# Patient Record
Sex: FEMALE | Race: WHITE
[De-identification: names, ages, dates, MRNs, and addresses within clinical notes are randomized per-mention and may not be internally consistent; named-entity substitution may affect disease eponyms.]

---

## 2018-01-26 ENCOUNTER — HOSPITAL ENCOUNTER (OUTPATIENT)
Dept: HOSPITAL 8 - CFH | Age: 83
Discharge: HOME | End: 2018-01-26
Attending: LICENSED PRACTICAL NURSE
Payer: MEDICARE

## 2018-01-26 DIAGNOSIS — Z13.820: Primary | ICD-10-CM

## 2018-01-26 DIAGNOSIS — M85.88: ICD-10-CM

## 2018-01-26 PROCEDURE — 77080 DXA BONE DENSITY AXIAL: CPT

## 2018-04-27 ENCOUNTER — HOSPITAL ENCOUNTER (OUTPATIENT)
Dept: HOSPITAL 8 - RAD | Age: 83
Discharge: HOME | End: 2018-04-27
Attending: NURSE PRACTITIONER
Payer: MEDICARE

## 2018-04-27 DIAGNOSIS — K22.8: ICD-10-CM

## 2018-04-27 DIAGNOSIS — K21.9: Primary | ICD-10-CM

## 2018-04-27 PROCEDURE — 74241: CPT

## 2018-12-22 ENCOUNTER — OFFICE VISIT (OUTPATIENT)
Dept: URGENT CARE | Facility: MEDICAL CENTER | Age: 83
End: 2018-12-22
Payer: MEDICARE

## 2018-12-22 VITALS
TEMPERATURE: 97.4 F | BODY MASS INDEX: 24.28 KG/M2 | DIASTOLIC BLOOD PRESSURE: 100 MMHG | OXYGEN SATURATION: 94 % | WEIGHT: 160.2 LBS | HEART RATE: 76 BPM | SYSTOLIC BLOOD PRESSURE: 138 MMHG | HEIGHT: 68 IN

## 2018-12-22 DIAGNOSIS — J02.9 VIRAL PHARYNGITIS: ICD-10-CM

## 2018-12-22 LAB
INT CON NEG: NORMAL
INT CON POS: NORMAL
S PYO AG THROAT QL: NEGATIVE

## 2018-12-22 PROCEDURE — 99203 OFFICE O/P NEW LOW 30 MIN: CPT | Performed by: PHYSICIAN ASSISTANT

## 2018-12-22 PROCEDURE — 87880 STREP A ASSAY W/OPTIC: CPT | Performed by: PHYSICIAN ASSISTANT

## 2018-12-22 RX ORDER — HYDROCHLOROTHIAZIDE 25 MG/1
TABLET ORAL
COMMUNITY
Start: 2018-12-06 | End: 2019-01-21

## 2018-12-22 RX ORDER — AMITRIPTYLINE HYDROCHLORIDE 10 MG/1
TABLET, FILM COATED ORAL EVERY EVENING
Status: ON HOLD | COMMUNITY
Start: 2018-12-07 | End: 2019-02-11

## 2018-12-22 RX ORDER — DIPHENHYDRAMINE HYDROCHLORIDE AND LIDOCAINE HYDROCHLORIDE AND ALUMINUM HYDROXIDE AND MAGNESIUM HYDRO
5 KIT EVERY 6 HOURS PRN
Qty: 100 ML | Refills: 0 | Status: SHIPPED | OUTPATIENT
Start: 2018-12-22 | End: 2019-01-21

## 2018-12-22 ASSESSMENT — ENCOUNTER SYMPTOMS
VOMITING: 0
COUGH: 0
SHORTNESS OF BREATH: 0
MUSCULOSKELETAL NEGATIVE: 1
FEVER: 0
DIARRHEA: 0
NECK PAIN: 0
NAUSEA: 0
CHILLS: 0
SPUTUM PRODUCTION: 0
TROUBLE SWALLOWING: 1
ABDOMINAL PAIN: 0
SORE THROAT: 1
DIZZINESS: 0
SWOLLEN GLANDS: 0

## 2018-12-22 NOTE — PROGRESS NOTES
"Subjective:      Ina Small is a 85 y.o. female who presents with Pharyngitis (hard to swallow X1 day)            Pharyngitis    This is a new problem. The current episode started yesterday. The problem has been unchanged. Neither side of throat is experiencing more pain than the other. There has been no fever. The pain is at a severity of 4/10. The pain is moderate. Associated symptoms include trouble swallowing. Pertinent negatives include no abdominal pain, congestion, coughing, diarrhea, ear pain, plugged ear sensation, neck pain, shortness of breath, swollen glands or vomiting. She has had no exposure to strep. She has tried nothing for the symptoms.       Review of Systems   Constitutional: Negative for chills and fever.   HENT: Positive for sore throat and trouble swallowing. Negative for congestion and ear pain.    Respiratory: Negative for cough, sputum production and shortness of breath.    Cardiovascular: Negative for chest pain.   Gastrointestinal: Negative for abdominal pain, diarrhea, nausea and vomiting.   Genitourinary: Negative.    Musculoskeletal: Negative.  Negative for neck pain.   Skin: Negative for rash.   Neurological: Negative for dizziness.          Objective:     /100   Pulse 76   Temp 36.3 °C (97.4 °F) (Temporal)   Ht 1.715 m (5' 7.5\")   Wt 72.7 kg (160 lb 3.2 oz)   SpO2 94%   BMI 24.72 kg/m²      Physical Exam   Constitutional: She is oriented to person, place, and time. She appears well-developed and well-nourished. No distress.   HENT:   Head: Normocephalic and atraumatic.   Right Ear: Hearing, tympanic membrane, external ear and ear canal normal.   Left Ear: Hearing, tympanic membrane, external ear and ear canal normal.   Mouth/Throat: Posterior oropharyngeal erythema present. No oropharyngeal exudate or posterior oropharyngeal edema. No tonsillar exudate.   Mild posterior oropharyngeal erythema without enlarged tonsils or exudates noted.    Eyes: Pupils are equal, " round, and reactive to light. Conjunctivae are normal. Right eye exhibits no discharge. Left eye exhibits no discharge.   Neck: Normal range of motion.   Cardiovascular: Normal rate, regular rhythm and normal heart sounds.    No murmur heard.  Pulmonary/Chest: Effort normal and breath sounds normal. No respiratory distress. She has no wheezes.   Musculoskeletal: Normal range of motion.   Lymphadenopathy:     She has no cervical adenopathy.   Neurological: She is alert and oriented to person, place, and time.   Skin: Skin is warm and dry. She is not diaphoretic.   Psychiatric: She has a normal mood and affect. Her behavior is normal.   Nursing note and vitals reviewed.         PMH:  has a past medical history of Arthritis; Hypertension; and Other specified disorder of intestines.  MEDS:   Current Outpatient Prescriptions:   •  amitriptyline (ELAVIL) 10 MG Tab, , Disp: , Rfl:   •  DPH-Lido-AlHydr-MgHydr-Simeth (MAGIC MOUTHWASH BLM) Suspension, Take 5 mL by mouth every 6 hours as needed., Disp: 100 mL, Rfl: 0  •  Estrogens, Conjugated (PREMARIN VA), Insert  in vagina., Disp: , Rfl:   •  hydrochlorothiazide (HYDRODIURIL) 12.5 MG tablet, Take 12.5 mg by mouth every day., Disp: , Rfl:   •  gabapentin (NEURONTIN) 300 MG CAPS, Take 300 mg by mouth 2 times a day., Disp: , Rfl:   •  tramadol (ULTRAM) 50 MG TABS, Take  mg by mouth 2 Times a Day., Disp: , Rfl:   •  Multiple Vitamin (MULTI-VITAMIN PO), Take  by mouth every day., Disp: , Rfl:   •  VITAMIN D PO, Take  by mouth every day., Disp: , Rfl:   •  hydroCHLOROthiazide (HYDRODIURIL) 25 MG Tab, , Disp: , Rfl:   •  ondansetron (ZOFRAN ODT) 8 MG TBDP, Take 1 Tab by mouth every 8 hours as needed for Nausea/Vomiting., Disp: 15 Tab, Rfl: 0  •  levothyroxine (SYNTHROID) 50 MCG TABS, Take 50 mcg by mouth every day., Disp: , Rfl:   •  meclizine (ANTIVERT) 25 MG TABS, Take 1 Tab by mouth 3 times a day as needed for Dizziness, Nausea/Vomiting or Vertigo., Disp: 12 Tab, Rfl: 0  •   aspirin (ASA) 81 MG CHEW, Take 81 mg by mouth every day., Disp: , Rfl:   •  Omega-3 Fatty Acids (FISH OIL PO), Take 4,000 mg by mouth 2 times a day., Disp: , Rfl:   ALLERGIES: No Known Allergies  SURGHX:   Past Surgical History:   Procedure Laterality Date   • COLON OVERGROWTH  1/8/2010    Performed by TRACI GARCIA at ENDOSCOPY [facility]   • GYN SURGERY      tubal ligation 1965,d&c 1974,1976,1983   • OTHER ABDOMINAL SURGERY      appey 1956   • OTHER ORTHOPEDIC SURGERY      total knee right 2007     SOCHX:  reports that she has quit smoking. She has never used smokeless tobacco. She reports that she drinks alcohol. She reports that she does not use drugs.  FH: family history is not on file.       Assessment/Plan:     1. Viral pharyngitis  - POCT Rapid Strep A: NEGATIVE  - DPH-Lido-AlHydr-MgHydr-Simeth (MAGIC MOUTHWASH BLM) Suspension; Take 5 mL by mouth every 6 hours as needed.  Dispense: 100 mL; Refill: 0    Advised patient symptoms are most likely viral in etiology, recommend supportive care. Increased fluids and rest. Warm salt water gargles and magic mouthwash as needed for symptomatic relief. Call or return to office if symptoms persist or worsen. The patient demonstrated a good understanding and agreed with the treatment plan.

## 2019-01-10 ENCOUNTER — APPOINTMENT (OUTPATIENT)
Dept: OTHER | Facility: IMAGING CENTER | Age: 84
End: 2019-01-10

## 2019-01-21 DIAGNOSIS — Z01.812 PRE-OPERATIVE LABORATORY EXAMINATION: ICD-10-CM

## 2019-01-21 DIAGNOSIS — Z01.810 PRE-OPERATIVE CARDIOVASCULAR EXAMINATION: ICD-10-CM

## 2019-01-21 LAB
ANION GAP SERPL CALC-SCNC: 6 MMOL/L (ref 0–11.9)
APPEARANCE UR: CLEAR
BILIRUB UR QL STRIP.AUTO: NEGATIVE
BUN SERPL-MCNC: 18 MG/DL (ref 8–22)
CALCIUM SERPL-MCNC: 10.3 MG/DL (ref 8.5–10.5)
CHLORIDE SERPL-SCNC: 101 MMOL/L (ref 96–112)
CO2 SERPL-SCNC: 31 MMOL/L (ref 20–33)
COLOR UR: YELLOW
CREAT SERPL-MCNC: 1.04 MG/DL (ref 0.5–1.4)
EKG IMPRESSION: NORMAL
ERYTHROCYTE [DISTWIDTH] IN BLOOD BY AUTOMATED COUNT: 44.1 FL (ref 35.9–50)
GLUCOSE SERPL-MCNC: 89 MG/DL (ref 65–99)
GLUCOSE UR STRIP.AUTO-MCNC: NEGATIVE MG/DL
HCT VFR BLD AUTO: 45.8 % (ref 37–47)
HGB BLD-MCNC: 14.7 G/DL (ref 12–16)
KETONES UR STRIP.AUTO-MCNC: NEGATIVE MG/DL
LEUKOCYTE ESTERASE UR QL STRIP.AUTO: NEGATIVE
MCH RBC QN AUTO: 29.1 PG (ref 27–33)
MCHC RBC AUTO-ENTMCNC: 32.1 G/DL (ref 33.6–35)
MCV RBC AUTO: 90.5 FL (ref 81.4–97.8)
MICRO URNS: NORMAL
NITRITE UR QL STRIP.AUTO: NEGATIVE
PH UR STRIP.AUTO: 7.5 [PH]
PLATELET # BLD AUTO: 228 K/UL (ref 164–446)
PMV BLD AUTO: 11.1 FL (ref 9–12.9)
POTASSIUM SERPL-SCNC: 3.7 MMOL/L (ref 3.6–5.5)
PROT UR QL STRIP: NEGATIVE MG/DL
RBC # BLD AUTO: 5.06 M/UL (ref 4.2–5.4)
RBC UR QL AUTO: NEGATIVE
SCCMEC + MECA PNL NOSE NAA+PROBE: NEGATIVE
SCCMEC + MECA PNL NOSE NAA+PROBE: NEGATIVE
SODIUM SERPL-SCNC: 138 MMOL/L (ref 135–145)
SP GR UR STRIP.AUTO: 1.01
UROBILINOGEN UR STRIP.AUTO-MCNC: 1 MG/DL
WBC # BLD AUTO: 7.7 K/UL (ref 4.8–10.8)

## 2019-01-21 PROCEDURE — 93010 ELECTROCARDIOGRAM REPORT: CPT | Performed by: INTERNAL MEDICINE

## 2019-01-21 PROCEDURE — 85027 COMPLETE CBC AUTOMATED: CPT

## 2019-01-21 PROCEDURE — 81003 URINALYSIS AUTO W/O SCOPE: CPT

## 2019-01-21 PROCEDURE — 36415 COLL VENOUS BLD VENIPUNCTURE: CPT

## 2019-01-21 PROCEDURE — 93005 ELECTROCARDIOGRAM TRACING: CPT

## 2019-01-21 PROCEDURE — 87640 STAPH A DNA AMP PROBE: CPT

## 2019-01-21 PROCEDURE — 80048 BASIC METABOLIC PNL TOTAL CA: CPT

## 2019-01-21 PROCEDURE — 87086 URINE CULTURE/COLONY COUNT: CPT

## 2019-01-21 PROCEDURE — 87641 MR-STAPH DNA AMP PROBE: CPT

## 2019-01-21 RX ORDER — HYDROCHLOROTHIAZIDE 25 MG/1
25 TABLET ORAL EVERY MORNING
COMMUNITY

## 2019-01-21 RX ORDER — POLYETHYLENE GLYCOL 3350 17 G/17G
17 POWDER, FOR SOLUTION ORAL DAILY
Status: ON HOLD | COMMUNITY
End: 2019-02-11

## 2019-01-21 RX ORDER — FEXOFENADINE HCL 180 MG/1
180 TABLET ORAL EVERY MORNING
COMMUNITY

## 2019-01-21 RX ORDER — IBUPROFEN 200 MG
200 TABLET ORAL EVERY 6 HOURS PRN
Status: ON HOLD | COMMUNITY
End: 2019-02-11

## 2019-01-21 NOTE — OR NURSING
"Preadmit appointment: \" Preparing for your Procedure information\" sheet given to patient with verbal and written instructions. Patient instructed to continue prescribed medications through the day before surgery, instructed to take the following medications the day of surgery per anesthesia protocol: gabapentin, tramadol.  "

## 2019-01-21 NOTE — DISCHARGE PLANNING
DISCHARGE PLANNING NOTE - TOTAL JOINT     Procedure: Procedure(s):  KNEE ARTHROPLASTY TOTAL  Procedure Date: 2/11/2019  Insurance:  Payor: MEDICARE / Plan: MEDICARE PART A & B / Product Type: *No Product type* /   Equipment currently available at home? bedside commode, front-wheel walker, raised toilet seat, shower chair and reachers  Steps into the home? 3  Steps within the home? none  Toilet height? Standard with riser  Type of shower? walk-in shower with bench and hose  Who will be with you during your recovery? DaughterRegino  Is Outpatient Physical Therapy set up after surgery? No but planned  Did you take the Total Joint Class and where? Yes, at [facility]     Plan: Met with patient during preadmission appointment.  Reviewed Equipment Resource list and provided a copy to the patient with Care Chest recommendation.  Provided and reviewed Home Safety Checklist.  Quiet Hours information given and reviewed.  There are no identified discharge needs at this time.  Anticipate discharge home with family.  No Dme ordered/needed at this time.

## 2019-01-24 LAB
BACTERIA UR CULT: NORMAL
SIGNIFICANT IND 70042: NORMAL
SITE SITE: NORMAL
SOURCE SOURCE: NORMAL

## 2019-02-11 ENCOUNTER — APPOINTMENT (OUTPATIENT)
Dept: RADIOLOGY | Facility: MEDICAL CENTER | Age: 84
End: 2019-02-11
Attending: ORTHOPAEDIC SURGERY
Payer: MEDICARE

## 2019-02-11 ENCOUNTER — HOSPITAL ENCOUNTER (OUTPATIENT)
Facility: MEDICAL CENTER | Age: 84
End: 2019-02-12
Attending: ORTHOPAEDIC SURGERY | Admitting: ORTHOPAEDIC SURGERY
Payer: MEDICARE

## 2019-02-11 PROCEDURE — 160022 HCHG BLOCK: Performed by: ORTHOPAEDIC SURGERY

## 2019-02-11 PROCEDURE — 73560 X-RAY EXAM OF KNEE 1 OR 2: CPT | Mod: LT

## 2019-02-11 PROCEDURE — 700102 HCHG RX REV CODE 250 W/ 637 OVERRIDE(OP): Performed by: STUDENT IN AN ORGANIZED HEALTH CARE EDUCATION/TRAINING PROGRAM

## 2019-02-11 PROCEDURE — A9270 NON-COVERED ITEM OR SERVICE: HCPCS | Performed by: ORTHOPAEDIC SURGERY

## 2019-02-11 PROCEDURE — L8699 PROSTHETIC IMPLANT NOS: HCPCS | Performed by: ORTHOPAEDIC SURGERY

## 2019-02-11 PROCEDURE — 700111 HCHG RX REV CODE 636 W/ 250 OVERRIDE (IP): Performed by: STUDENT IN AN ORGANIZED HEALTH CARE EDUCATION/TRAINING PROGRAM

## 2019-02-11 PROCEDURE — 160002 HCHG RECOVERY MINUTES (STAT): Performed by: ORTHOPAEDIC SURGERY

## 2019-02-11 PROCEDURE — 700111 HCHG RX REV CODE 636 W/ 250 OVERRIDE (IP)

## 2019-02-11 PROCEDURE — 160009 HCHG ANES TIME/MIN: Performed by: ORTHOPAEDIC SURGERY

## 2019-02-11 PROCEDURE — 700102 HCHG RX REV CODE 250 W/ 637 OVERRIDE(OP)

## 2019-02-11 PROCEDURE — 700112 HCHG RX REV CODE 229: Performed by: ORTHOPAEDIC SURGERY

## 2019-02-11 PROCEDURE — 160035 HCHG PACU - 1ST 60 MINS PHASE I: Performed by: ORTHOPAEDIC SURGERY

## 2019-02-11 PROCEDURE — 96365 THER/PROPH/DIAG IV INF INIT: CPT

## 2019-02-11 PROCEDURE — 700101 HCHG RX REV CODE 250: Performed by: ORTHOPAEDIC SURGERY

## 2019-02-11 PROCEDURE — A9270 NON-COVERED ITEM OR SERVICE: HCPCS | Performed by: STUDENT IN AN ORGANIZED HEALTH CARE EDUCATION/TRAINING PROGRAM

## 2019-02-11 PROCEDURE — 501838 HCHG SUTURE GENERAL: Performed by: ORTHOPAEDIC SURGERY

## 2019-02-11 PROCEDURE — 700111 HCHG RX REV CODE 636 W/ 250 OVERRIDE (IP): Performed by: ORTHOPAEDIC SURGERY

## 2019-02-11 PROCEDURE — G0378 HOSPITAL OBSERVATION PER HR: HCPCS

## 2019-02-11 PROCEDURE — 700102 HCHG RX REV CODE 250 W/ 637 OVERRIDE(OP): Performed by: ORTHOPAEDIC SURGERY

## 2019-02-11 PROCEDURE — A9270 NON-COVERED ITEM OR SERVICE: HCPCS

## 2019-02-11 PROCEDURE — 160041 HCHG SURGERY MINUTES - EA ADDL 1 MIN LEVEL 4: Performed by: ORTHOPAEDIC SURGERY

## 2019-02-11 PROCEDURE — 97161 PT EVAL LOW COMPLEX 20 MIN: CPT

## 2019-02-11 PROCEDURE — 700105 HCHG RX REV CODE 258: Performed by: ORTHOPAEDIC SURGERY

## 2019-02-11 PROCEDURE — 160029 HCHG SURGERY MINUTES - 1ST 30 MINS LEVEL 4: Performed by: ORTHOPAEDIC SURGERY

## 2019-02-11 PROCEDURE — 700101 HCHG RX REV CODE 250

## 2019-02-11 PROCEDURE — 160048 HCHG OR STATISTICAL LEVEL 1-5: Performed by: ORTHOPAEDIC SURGERY

## 2019-02-11 PROCEDURE — 502000 HCHG MISC OR IMPLANTS RC 0278: Performed by: ORTHOPAEDIC SURGERY

## 2019-02-11 PROCEDURE — 160036 HCHG PACU - EA ADDL 30 MINS PHASE I: Performed by: ORTHOPAEDIC SURGERY

## 2019-02-11 DEVICE — IMPLANT GII OVAL RESURFACING PAT 32MM (1EA): Type: IMPLANTABLE DEVICE | Site: KNEE | Status: FUNCTIONAL

## 2019-02-11 DEVICE — IMPLANT GNS II C/R FEM SIZE 6 LEFT: Type: IMPLANTABLE DEVICE | Site: KNEE | Status: FUNCTIONAL

## 2019-02-11 DEVICE — IMPLANTABLE DEVICE: Type: IMPLANTABLE DEVICE | Site: KNEE | Status: FUNCTIONAL

## 2019-02-11 DEVICE — CEMENT ORTHOPEDIC HV US  (10/PK): Type: IMPLANTABLE DEVICE | Site: KNEE | Status: FUNCTIONAL

## 2019-02-11 DEVICE — IMPLANT GII C/R ART INS SZ 3-4 11MM: Type: IMPLANTABLE DEVICE | Site: KNEE | Status: FUNCTIONAL

## 2019-02-11 DEVICE — IMPLANT GNS II CMT TIB SIZE 4 LEFT (1EA): Type: IMPLANTABLE DEVICE | Site: KNEE | Status: FUNCTIONAL

## 2019-02-11 RX ORDER — HYDROMORPHONE HYDROCHLORIDE 1 MG/ML
0.1 INJECTION, SOLUTION INTRAMUSCULAR; INTRAVENOUS; SUBCUTANEOUS
Status: DISCONTINUED | OUTPATIENT
Start: 2019-02-11 | End: 2019-02-11 | Stop reason: HOSPADM

## 2019-02-11 RX ORDER — DOCUSATE SODIUM 100 MG/1
100 CAPSULE, LIQUID FILLED ORAL 2 TIMES DAILY
Status: DISCONTINUED | OUTPATIENT
Start: 2019-02-11 | End: 2019-02-12 | Stop reason: HOSPADM

## 2019-02-11 RX ORDER — ACETAMINOPHEN 500 MG
1000 TABLET ORAL ONCE
Status: COMPLETED | OUTPATIENT
Start: 2019-02-11 | End: 2019-02-11

## 2019-02-11 RX ORDER — OXYCODONE HYDROCHLORIDE 10 MG/1
10 TABLET ORAL
Status: DISCONTINUED | OUTPATIENT
Start: 2019-02-11 | End: 2019-02-12 | Stop reason: HOSPADM

## 2019-02-11 RX ORDER — AMOXICILLIN 250 MG
1 CAPSULE ORAL
Status: DISCONTINUED | OUTPATIENT
Start: 2019-02-11 | End: 2019-02-12 | Stop reason: HOSPADM

## 2019-02-11 RX ORDER — OXYCODONE HCL 5 MG/5 ML
10 SOLUTION, ORAL ORAL
Status: COMPLETED | OUTPATIENT
Start: 2019-02-11 | End: 2019-02-11

## 2019-02-11 RX ORDER — GABAPENTIN 300 MG/1
300 CAPSULE ORAL 2 TIMES DAILY
Status: DISCONTINUED | OUTPATIENT
Start: 2019-02-11 | End: 2019-02-12 | Stop reason: HOSPADM

## 2019-02-11 RX ORDER — FEXOFENADINE HCL 60 MG/1
180 TABLET, FILM COATED ORAL EVERY MORNING
Status: DISCONTINUED | OUTPATIENT
Start: 2019-02-11 | End: 2019-02-12 | Stop reason: HOSPADM

## 2019-02-11 RX ORDER — HYDROMORPHONE HYDROCHLORIDE 1 MG/ML
0.4 INJECTION, SOLUTION INTRAMUSCULAR; INTRAVENOUS; SUBCUTANEOUS
Status: DISCONTINUED | OUTPATIENT
Start: 2019-02-11 | End: 2019-02-11 | Stop reason: HOSPADM

## 2019-02-11 RX ORDER — FLUTICASONE PROPIONATE 50 MCG
1-2 SPRAY, SUSPENSION (ML) NASAL
COMMUNITY

## 2019-02-11 RX ORDER — HALOPERIDOL 5 MG/ML
1 INJECTION INTRAMUSCULAR
Status: DISCONTINUED | OUTPATIENT
Start: 2019-02-11 | End: 2019-02-11 | Stop reason: HOSPADM

## 2019-02-11 RX ORDER — POLYETHYLENE GLYCOL 3350 17 G/17G
1 POWDER, FOR SOLUTION ORAL 2 TIMES DAILY PRN
Status: DISCONTINUED | OUTPATIENT
Start: 2019-02-11 | End: 2019-02-12 | Stop reason: HOSPADM

## 2019-02-11 RX ORDER — CELECOXIB 200 MG/1
CAPSULE ORAL
Status: COMPLETED
Start: 2019-02-11 | End: 2019-02-11

## 2019-02-11 RX ORDER — DEXTROSE, SODIUM CHLORIDE, SODIUM LACTATE, POTASSIUM CHLORIDE, AND CALCIUM CHLORIDE 5; .6; .31; .03; .02 G/100ML; G/100ML; G/100ML; G/100ML; G/100ML
INJECTION, SOLUTION INTRAVENOUS CONTINUOUS
Status: DISCONTINUED | OUTPATIENT
Start: 2019-02-11 | End: 2019-02-12 | Stop reason: HOSPADM

## 2019-02-11 RX ORDER — DIPHENHYDRAMINE HYDROCHLORIDE 50 MG/ML
25 INJECTION INTRAMUSCULAR; INTRAVENOUS EVERY 6 HOURS PRN
Status: DISCONTINUED | OUTPATIENT
Start: 2019-02-11 | End: 2019-02-12 | Stop reason: HOSPADM

## 2019-02-11 RX ORDER — ACETAMINOPHEN 650 MG/1
650 SUPPOSITORY RECTAL EVERY 6 HOURS
Status: DISCONTINUED | OUTPATIENT
Start: 2019-02-11 | End: 2019-02-11

## 2019-02-11 RX ORDER — AMITRIPTYLINE HYDROCHLORIDE 50 MG/1
100 TABLET, FILM COATED ORAL
Status: DISCONTINUED | OUTPATIENT
Start: 2019-02-11 | End: 2019-02-11

## 2019-02-11 RX ORDER — DEXAMETHASONE SODIUM PHOSPHATE 4 MG/ML
4 INJECTION, SOLUTION INTRA-ARTICULAR; INTRALESIONAL; INTRAMUSCULAR; INTRAVENOUS; SOFT TISSUE
Status: DISCONTINUED | OUTPATIENT
Start: 2019-02-11 | End: 2019-02-12 | Stop reason: HOSPADM

## 2019-02-11 RX ORDER — HYDROMORPHONE HYDROCHLORIDE 1 MG/ML
0.5 INJECTION, SOLUTION INTRAMUSCULAR; INTRAVENOUS; SUBCUTANEOUS
Status: DISCONTINUED | OUTPATIENT
Start: 2019-02-11 | End: 2019-02-12 | Stop reason: HOSPADM

## 2019-02-11 RX ORDER — AMITRIPTYLINE HYDROCHLORIDE 10 MG/1
10 TABLET, FILM COATED ORAL
COMMUNITY
End: 2021-07-01

## 2019-02-11 RX ORDER — DIPHENHYDRAMINE HCL 25 MG
25 TABLET ORAL EVERY 6 HOURS PRN
Status: DISCONTINUED | OUTPATIENT
Start: 2019-02-11 | End: 2019-02-12 | Stop reason: HOSPADM

## 2019-02-11 RX ORDER — ONDANSETRON 2 MG/ML
4 INJECTION INTRAMUSCULAR; INTRAVENOUS
Status: DISCONTINUED | OUTPATIENT
Start: 2019-02-11 | End: 2019-02-11 | Stop reason: HOSPADM

## 2019-02-11 RX ORDER — BISACODYL 10 MG
10 SUPPOSITORY, RECTAL RECTAL
Status: DISCONTINUED | OUTPATIENT
Start: 2019-02-11 | End: 2019-02-12 | Stop reason: HOSPADM

## 2019-02-11 RX ORDER — ROPIVACAINE HYDROCHLORIDE 5 MG/ML
INJECTION, SOLUTION EPIDURAL; INFILTRATION; PERINEURAL
Status: DISCONTINUED | OUTPATIENT
Start: 2019-02-11 | End: 2019-02-11 | Stop reason: HOSPADM

## 2019-02-11 RX ORDER — DIPHENHYDRAMINE HYDROCHLORIDE 50 MG/ML
12.5 INJECTION INTRAMUSCULAR; INTRAVENOUS
Status: DISCONTINUED | OUTPATIENT
Start: 2019-02-11 | End: 2019-02-11 | Stop reason: HOSPADM

## 2019-02-11 RX ORDER — AMITRIPTYLINE HYDROCHLORIDE 10 MG/1
10 TABLET, FILM COATED ORAL
Status: DISCONTINUED | OUTPATIENT
Start: 2019-02-11 | End: 2019-02-12 | Stop reason: HOSPADM

## 2019-02-11 RX ORDER — OXYCODONE HCL 5 MG/5 ML
5 SOLUTION, ORAL ORAL
Status: COMPLETED | OUTPATIENT
Start: 2019-02-11 | End: 2019-02-11

## 2019-02-11 RX ORDER — ACETAMINOPHEN 500 MG
TABLET ORAL
Status: COMPLETED
Start: 2019-02-11 | End: 2019-02-11

## 2019-02-11 RX ORDER — KETOROLAC TROMETHAMINE 30 MG/ML
INJECTION, SOLUTION INTRAMUSCULAR; INTRAVENOUS
Status: DISCONTINUED | OUTPATIENT
Start: 2019-02-11 | End: 2019-02-11 | Stop reason: HOSPADM

## 2019-02-11 RX ORDER — HALOPERIDOL 5 MG/ML
1 INJECTION INTRAMUSCULAR EVERY 6 HOURS PRN
Status: DISCONTINUED | OUTPATIENT
Start: 2019-02-11 | End: 2019-02-12 | Stop reason: HOSPADM

## 2019-02-11 RX ORDER — SODIUM CHLORIDE, SODIUM LACTATE, POTASSIUM CHLORIDE, CALCIUM CHLORIDE 600; 310; 30; 20 MG/100ML; MG/100ML; MG/100ML; MG/100ML
INJECTION, SOLUTION INTRAVENOUS CONTINUOUS
Status: DISCONTINUED | OUTPATIENT
Start: 2019-02-11 | End: 2019-02-11 | Stop reason: HOSPADM

## 2019-02-11 RX ORDER — EPINEPHRINE 1 MG/ML(1)
AMPUL (ML) INJECTION
Status: DISCONTINUED | OUTPATIENT
Start: 2019-02-11 | End: 2019-02-11 | Stop reason: HOSPADM

## 2019-02-11 RX ORDER — ONDANSETRON 2 MG/ML
4 INJECTION INTRAMUSCULAR; INTRAVENOUS EVERY 4 HOURS PRN
Status: DISCONTINUED | OUTPATIENT
Start: 2019-02-11 | End: 2019-02-12 | Stop reason: HOSPADM

## 2019-02-11 RX ORDER — OXYCODONE HYDROCHLORIDE 5 MG/1
5 TABLET ORAL
Status: DISCONTINUED | OUTPATIENT
Start: 2019-02-11 | End: 2019-02-12 | Stop reason: HOSPADM

## 2019-02-11 RX ORDER — CELECOXIB 200 MG/1
200 CAPSULE ORAL ONCE
Status: COMPLETED | OUTPATIENT
Start: 2019-02-11 | End: 2019-02-11

## 2019-02-11 RX ORDER — SODIUM CHLORIDE, SODIUM LACTATE, POTASSIUM CHLORIDE, CALCIUM CHLORIDE 600; 310; 30; 20 MG/100ML; MG/100ML; MG/100ML; MG/100ML
INJECTION, SOLUTION INTRAVENOUS
Status: DISCONTINUED | OUTPATIENT
Start: 2019-02-11 | End: 2019-02-12 | Stop reason: HOSPADM

## 2019-02-11 RX ORDER — SCOLOPAMINE TRANSDERMAL SYSTEM 1 MG/1
1 PATCH, EXTENDED RELEASE TRANSDERMAL
Status: DISCONTINUED | OUTPATIENT
Start: 2019-02-11 | End: 2019-02-12 | Stop reason: HOSPADM

## 2019-02-11 RX ORDER — ENEMA 19; 7 G/133ML; G/133ML
1 ENEMA RECTAL
Status: DISCONTINUED | OUTPATIENT
Start: 2019-02-11 | End: 2019-02-12 | Stop reason: HOSPADM

## 2019-02-11 RX ORDER — AMOXICILLIN 250 MG
1 CAPSULE ORAL NIGHTLY
Status: DISCONTINUED | OUTPATIENT
Start: 2019-02-11 | End: 2019-02-12 | Stop reason: HOSPADM

## 2019-02-11 RX ORDER — HYDROCHLOROTHIAZIDE 25 MG/1
25 TABLET ORAL EVERY MORNING
Status: DISCONTINUED | OUTPATIENT
Start: 2019-02-11 | End: 2019-02-12 | Stop reason: HOSPADM

## 2019-02-11 RX ORDER — HYDROMORPHONE HYDROCHLORIDE 1 MG/ML
0.2 INJECTION, SOLUTION INTRAMUSCULAR; INTRAVENOUS; SUBCUTANEOUS
Status: DISCONTINUED | OUTPATIENT
Start: 2019-02-11 | End: 2019-02-11 | Stop reason: HOSPADM

## 2019-02-11 RX ORDER — FLUTICASONE FUROATE 100 UG/1
1 POWDER RESPIRATORY (INHALATION)
COMMUNITY
Start: 2018-11-26 | End: 2021-07-01

## 2019-02-11 RX ORDER — ACETAMINOPHEN 325 MG/1
650 TABLET ORAL EVERY 6 HOURS
Status: DISCONTINUED | OUTPATIENT
Start: 2019-02-11 | End: 2019-02-12 | Stop reason: HOSPADM

## 2019-02-11 RX ADMIN — SODIUM CHLORIDE, POTASSIUM CHLORIDE, SODIUM LACTATE AND CALCIUM CHLORIDE 1000 ML: 600; 310; 30; 20 INJECTION, SOLUTION INTRAVENOUS at 08:25

## 2019-02-11 RX ADMIN — AMITRIPTYLINE HYDROCHLORIDE 10 MG: 10 TABLET, FILM COATED ORAL at 23:12

## 2019-02-11 RX ADMIN — OXYCODONE HYDROCHLORIDE 10 MG: 10 TABLET ORAL at 17:48

## 2019-02-11 RX ADMIN — SODIUM CHLORIDE, SODIUM LACTATE, POTASSIUM CHLORIDE, CALCIUM CHLORIDE: 600; 310; 30; 20 INJECTION, SOLUTION INTRAVENOUS at 11:16

## 2019-02-11 RX ADMIN — ACETAMINOPHEN 650 MG: 325 TABLET, FILM COATED ORAL at 17:49

## 2019-02-11 RX ADMIN — SODIUM CHLORIDE, SODIUM LACTATE, POTASSIUM CHLORIDE, CALCIUM CHLORIDE AND DEXTROSE MONOHYDRATE: 5; 600; 310; 30; 20 INJECTION, SOLUTION INTRAVENOUS at 13:00

## 2019-02-11 RX ADMIN — ACETAMINOPHEN 1000 MG: 500 TABLET, COATED ORAL at 08:06

## 2019-02-11 RX ADMIN — CELECOXIB 200 MG: 200 CAPSULE ORAL at 08:06

## 2019-02-11 RX ADMIN — DOCUSATE SODIUM 100 MG: 100 CAPSULE, LIQUID FILLED ORAL at 17:49

## 2019-02-11 RX ADMIN — GABAPENTIN 300 MG: 300 CAPSULE ORAL at 17:50

## 2019-02-11 RX ADMIN — Medication 1000 MG: at 08:06

## 2019-02-11 RX ADMIN — LIDOCAINE HYDROCHLORIDE 0.5 ML: 10 INJECTION, SOLUTION INFILTRATION; PERINEURAL at 08:25

## 2019-02-11 RX ADMIN — SODIUM CHLORIDE 2 G: 9 INJECTION, SOLUTION INTRAVENOUS at 17:50

## 2019-02-11 RX ADMIN — HYDROMORPHONE HYDROCHLORIDE 0.2 MG: 1 INJECTION, SOLUTION INTRAMUSCULAR; INTRAVENOUS; SUBCUTANEOUS at 12:07

## 2019-02-11 RX ADMIN — ACETAMINOPHEN 650 MG: 325 TABLET, FILM COATED ORAL at 12:59

## 2019-02-11 RX ADMIN — OXYCODONE HYDROCHLORIDE 10 MG: 10 TABLET ORAL at 12:59

## 2019-02-11 RX ADMIN — ACETAMINOPHEN 650 MG: 325 TABLET, FILM COATED ORAL at 23:12

## 2019-02-11 RX ADMIN — HYDROMORPHONE HYDROCHLORIDE 0.2 MG: 1 INJECTION, SOLUTION INTRAMUSCULAR; INTRAVENOUS; SUBCUTANEOUS at 12:02

## 2019-02-11 RX ADMIN — FENTANYL CITRATE 50 MCG: 50 INJECTION, SOLUTION INTRAMUSCULAR; INTRAVENOUS at 11:53

## 2019-02-11 RX ADMIN — OXYCODONE HYDROCHLORIDE 5 MG: 5 SOLUTION ORAL at 11:38

## 2019-02-11 RX ADMIN — OXYCODONE HYDROCHLORIDE 10 MG: 10 TABLET ORAL at 23:12

## 2019-02-11 ASSESSMENT — COGNITIVE AND FUNCTIONAL STATUS - GENERAL
MOVING TO AND FROM BED TO CHAIR: A LITTLE
TURNING FROM BACK TO SIDE WHILE IN FLAT BAD: A LITTLE
DRESSING REGULAR UPPER BODY CLOTHING: A LITTLE
DRESSING REGULAR LOWER BODY CLOTHING: A LITTLE
DAILY ACTIVITIY SCORE: 17
MOVING FROM LYING ON BACK TO SITTING ON SIDE OF FLAT BED: A LITTLE
SUGGESTED CMS G CODE MODIFIER MOBILITY: CK
MOBILITY SCORE: 17
CLIMB 3 TO 5 STEPS WITH RAILING: A LOT
SUGGESTED CMS G CODE MODIFIER DAILY ACTIVITY: CK
PERSONAL GROOMING: A LITTLE
EATING MEALS: A LOT
STANDING UP FROM CHAIR USING ARMS: A LITTLE
TOILETING: A LITTLE
WALKING IN HOSPITAL ROOM: A LITTLE
HELP NEEDED FOR BATHING: A LITTLE

## 2019-02-11 ASSESSMENT — LIFESTYLE VARIABLES
TOTAL SCORE: 0
EVER_SMOKED: NEVER
EVER HAD A DRINK FIRST THING IN THE MORNING TO STEADY YOUR NERVES TO GET RID OF A HANGOVER: NO
TOTAL SCORE: 0
HAVE YOU EVER FELT YOU SHOULD CUT DOWN ON YOUR DRINKING: NO
HOW MANY TIMES IN THE PAST YEAR HAVE YOU HAD 5 OR MORE DRINKS IN A DAY: 1
ON A TYPICAL DAY WHEN YOU DRINK ALCOHOL HOW MANY DRINKS DO YOU HAVE: 1
ALCOHOL_USE: YES
EVER FELT BAD OR GUILTY ABOUT YOUR DRINKING: NO
AVERAGE NUMBER OF DAYS PER WEEK YOU HAVE A DRINK CONTAINING ALCOHOL: 1
CONSUMPTION TOTAL: POSITIVE
EVER_SMOKED: NEVER
TOTAL SCORE: 0
HAVE PEOPLE ANNOYED YOU BY CRITICIZING YOUR DRINKING: NO

## 2019-02-11 ASSESSMENT — PATIENT HEALTH QUESTIONNAIRE - PHQ9
1. LITTLE INTEREST OR PLEASURE IN DOING THINGS: NOT AT ALL
2. FEELING DOWN, DEPRESSED, IRRITABLE, OR HOPELESS: NOT AT ALL
SUM OF ALL RESPONSES TO PHQ9 QUESTIONS 1 AND 2: 0

## 2019-02-11 ASSESSMENT — GAIT ASSESSMENTS: GAIT LEVEL OF ASSIST: REFUSED

## 2019-02-11 NOTE — PROGRESS NOTES
Received report from Pacu RN, assumed pt care. Pt transferred from PACU to GSU room 220. Pt A&Ox4, reports pain in knee, will medicate per MAR. Dressing to knee CDI, +CMS, cap refill less than 3 seconds, pt able to move leg w/o difficulty. Polar ice to knee. Pt d/t void post-op by 1830. Pt taught to inform nurse if experiencing pain above comfort goal, SOB, chest pain, ambulating out of bed, or for any further assistance. Fall precautions in place, call light within reach. Will continue with care.

## 2019-02-11 NOTE — THERAPY
"Physical Therapy Evaluation completed.   Bed Mobility:  Supine to Sit: Stand by Assist  Transfers: Sit to Stand: Contact Guard Assist  Gait: Level Of Assist: Refused (wants to ambulate later today, doesn't want to \"push it\") Plan of Care: Will benefit from Physical Therapy 7 times per week  Discharge Recommendations: Equipment: Will Continue to Assess for Equipment Needs. Post-acute therapy Discharge to home with outpt PT.    Pt is a 84 yo female s/p L TKA presenting with post-op pain and weakness, able to transfer safely to chair with FWW but did not feel like ambulating yet. Recommend continued acute PT to improve strength and mobility so can DC home safely with dtr assist.     See \"Rehab Therapy-Acute\" Patient Summary Report for complete documentation.     "

## 2019-02-11 NOTE — OP REPORT
DATE OF SERVICE:  02/11/2019    PREOPERATIVE DIAGNOSIS:  Severe degenerative arthritis, left knee.    POSTOPERATIVE DIAGNOSIS:  Severe degenerative arthritis, left knee.    PROCEDURE:  Left total knee replacement with a Smith and Nephew Iesha II   system using a cemented size 6 femur, cemented size 4 tibia, an 11 mm spacer,   and a cemented 32 mm oval patella.    SURGEON:  Sherwin Burdick MD    ASSISTANT:  Markus Black CST FA.  Of note, the assistant played a crucial role   in this procedure by helping with the exposure and instrumentation for this   complex arthroplasty.    ANESTHESIA:  General per LMA with adductor canal block.    ANESTHESIOLOGIST:  Trevon Simpson MD    OPERATIVE INDICATIONS:  The patient is a very active 85-year-old white female   who has had a long history of bilateral knee osteoarthritis.  She is status   post right total knee replacement and has done very well.  She is having   ongoing and worsening left knee pain now causing significant restriction to   activities of daily living.  She is no longer responding to conservative   programs.  This including activity modification, nonsteroidals   anti-inflammatories, and corticosteroid injections, and it was felt that a   total knee replacement was indicated to maintain activity in this woman.    PROCEDURE IN DETAIL:  The patient was brought to the operating room and placed   on table in a supine position where a satisfactory general anesthetic agent   was administered per LMA.  The patient was given 2 grams of Ancef and 1 gram   of tranexamic acid IV prior to beginning the procedure.  The left knee   revealed range of 0, 1, 130 with no pathological laxity about the cruciates or   collaterals.  Left lower extremity was prepped with gel prep, draped free in   a sterile fashion.  The leg was elevated for exsanguination and tourniquet was   inflated to 250 mmHg.  An 18 cm midline incision was made with the knee   flexed and carried sharply  through skin and subcutaneous tissue down to the   extensor mechanism.  A medial parapatellar incision was made.  The fat pad was   excised.  The anterior horns of medial and lateral menisci were resected as   was the ACL.  A minimal medial release was performed.  There was complete   articular cartilage loss laterally with some early erosion in the lateral   tibial plateau.  There were moderate medial changes as well.  The distal femur   was cannulated and a long intramedullary guide sharon was placed with the   5-degree valgus alignment guide.  This was appropriately positioned and   secured and a size 6 femur was felt appropriate from the intraoperative   measurements.  The initial anterior femoral cut was made.  The distal femoral   cutting guide was placed and secured and a distal femoral cut was made.  The   size 6, 4-in-1 cutting guide was placed and secured and final anterior and   posterior as well as chamfer cuts were made.  A size 6 trial was placed with   excellent fit.  A PCL retractor was placed.  The posterior horns of the medial   lateral menisci were resected.  The extramedullary tibial guide was placed   appropriately positioned and secured.  The tibial cut was made.  A size 4   tibia was felt to be appropriate.  The size 4 trial was placed appropriately   positioned and secured and the tibia was cannulated.  The tibial trial was   placed with the femoral trial and an 11 mm spacer.  The appropriate rotation   was obtained and marked.  The holes for the femoral lugs were punched and the   tibial fins were cut.  The tourniquet was released and initial tourniquet time   of 26 minutes.  The wound was copiously irrigated with normal saline.    Hemostasis was obtained with the electrocautery.  The patella was measured   with calipers and then patellar cutting guide was placed.  A patellar cut was   made.  A 32 mm oval patella was felt to be appropriate and the alignment holes   for the 32 mm oval patella  were drilled.  The wound was again copiously   irrigated with the waterpik and hemostasis again obtained with the   electrocautery.  The components were obtained on the field.  Leg was   re-elevated for exsanguination and tourniquet reinflated to 250 mmHg.  Two   batches of high viscosity cement were mixed with vacuum suctioning techniques.    Cement was applied first to the tibia and tibial component, which was placed   and fully impacted with excess cement removed.  Cement was then applied to   the femur and femoral component, which was placed and fully impacted with   excess cement removed.  An 11 mm spacer was placed and the knee was placed in   full extension to compress the components.  Cement was applied to the patella   and patellar component, which was placed and fully impacted with excess cement   removed.  The cement was allowed to fully cure.  All excess cement was   carefully removed.  An 11 mm spacer gave full extension, full flexion, and   excellent stability and full flexion and 60 degrees of flexion.  The wound was   again irrigated and the permanent 11 mm spacer was placed and seated.  Of   note, prior to cementing of the components a joint solution was created   containing 40 mg of 0.5% ropivacaine, 15 mg of Toradol, 1 mL of epinephrine   diluted in 90 mL with normal saline.  The 50 mL were placed in the   periarticular tissue with 20 mL being placed in the subcutaneous tissue at the   time of closure.  The wound was again irrigated and the incision was closed   with #2 PDO Quill suture in the extensor mechanism.  A vial vancomycin powder   was sprinkled in the subcutaneous tissue.  The subcutaneous tissue was closed   with 2-0 Vicryl and skin with staples.  A sterile dressing of an Aquacel   followed by an Ace wrap was applied.  The tourniquet was released.  Total   tourniquet time was 62 minutes.  The patient was awakened, extubated in the   operating room, and taken to recovery room in stable  condition.  Sponge and   needle counts were correct.  There were no identified intraoperative   complications.       ____________________________________     MD ROSA Ragsdale / MARCELA    DD:  02/11/2019 11:21:04  DT:  02/11/2019 12:01:54    D#:  [id number]  Job#:  [id number]    cc: PAULETTE HUDDLESTON [facility] FA

## 2019-02-11 NOTE — OR SURGEON
Immediate Post OP Note    PreOp Diagnosis: Severe OA left knee    PostOp Diagnosis: same    Procedure(s):  KNEE ARTHROPLASTY TOTAL - Wound Class: Clean    Surgeon(s):  Sherwin Burdick M.D.  Assist [facility]    Anesthesiologist/Type of Anesthesia:  Anesthesiologist: Trevon Simpson M.D./General    Surgical Staff:  Circulator: Jaycee Regalado R.N.  Limb Raymond: Philippe Jenkins  Scrub Person: Justin Hernandez    Specimens removed if any:  * No specimens in log *    Estimated Blood Loss: 50 cc    Findings: OA    Complications: none        2/11/2019 11:21 AM Sherwin Burdick M.D.

## 2019-02-11 NOTE — OR NURSING
Respirations easy. Ace wrap clean and dry with ice in place.  Toes warm, pink and mobile with brisk capillary refill, palpable pedal pulses.  Left leg elevated with pillow under heel.  Post-op X-ray done.  Dr. Burdick here in PACU talking with patient.  Pain meds with good effect, no nausea.  Report to floor.

## 2019-02-11 NOTE — CARE PLAN
Problem: Safety  Goal: Will remain free from injury    Intervention: Provide assistance with mobility  Bed in low position, traded socks on, call light within reach. Pt instructed to call nurse for any further needs. Bed alarm in place        Problem: Pain Management  Goal: Pain level will decrease to patient's comfort goal    Intervention: Follow pain managment plan developed in collaboration with patient and Interdisciplinary Team  Pain assessed throughout shift, medicated as needed per MD order, see MAR.

## 2019-02-11 NOTE — PROGRESS NOTES
2 RN skin assessment complete, skin not WDL. Pt has surgical incision on L knee, dressing CDI. See wound flowsheet.

## 2019-02-12 VITALS
BODY MASS INDEX: 24.01 KG/M2 | OXYGEN SATURATION: 98 % | TEMPERATURE: 97.9 F | RESPIRATION RATE: 18 BRPM | SYSTOLIC BLOOD PRESSURE: 124 MMHG | DIASTOLIC BLOOD PRESSURE: 56 MMHG | HEART RATE: 64 BPM | HEIGHT: 67 IN | WEIGHT: 153 LBS

## 2019-02-12 PROBLEM — M17.12 PRIMARY OSTEOARTHRITIS OF LEFT KNEE: Status: ACTIVE | Noted: 2019-02-12

## 2019-02-12 LAB
ANION GAP SERPL CALC-SCNC: 8 MMOL/L (ref 0–11.9)
BUN SERPL-MCNC: 17 MG/DL (ref 8–22)
CALCIUM SERPL-MCNC: 8.7 MG/DL (ref 8.4–10.2)
CHLORIDE SERPL-SCNC: 100 MMOL/L (ref 96–112)
CO2 SERPL-SCNC: 26 MMOL/L (ref 20–33)
CREAT SERPL-MCNC: 1.03 MG/DL (ref 0.5–1.4)
ERYTHROCYTE [DISTWIDTH] IN BLOOD BY AUTOMATED COUNT: 44.4 FL (ref 35.9–50)
GLUCOSE SERPL-MCNC: 123 MG/DL (ref 65–99)
HCT VFR BLD AUTO: 32.6 % (ref 37–47)
HGB BLD-MCNC: 10.9 G/DL (ref 12–16)
MCH RBC QN AUTO: 29.6 PG (ref 27–33)
MCHC RBC AUTO-ENTMCNC: 33.4 G/DL (ref 33.6–35)
MCV RBC AUTO: 88.6 FL (ref 81.4–97.8)
PLATELET # BLD AUTO: 197 K/UL (ref 164–446)
PMV BLD AUTO: 10.8 FL (ref 9–12.9)
POTASSIUM SERPL-SCNC: 3.5 MMOL/L (ref 3.6–5.5)
RBC # BLD AUTO: 3.68 M/UL (ref 4.2–5.4)
SODIUM SERPL-SCNC: 134 MMOL/L (ref 135–145)
WBC # BLD AUTO: 12.2 K/UL (ref 4.8–10.8)

## 2019-02-12 PROCEDURE — 85027 COMPLETE CBC AUTOMATED: CPT

## 2019-02-12 PROCEDURE — 700111 HCHG RX REV CODE 636 W/ 250 OVERRIDE (IP): Performed by: ORTHOPAEDIC SURGERY

## 2019-02-12 PROCEDURE — A9270 NON-COVERED ITEM OR SERVICE: HCPCS | Performed by: ORTHOPAEDIC SURGERY

## 2019-02-12 PROCEDURE — 700105 HCHG RX REV CODE 258: Performed by: ORTHOPAEDIC SURGERY

## 2019-02-12 PROCEDURE — 97165 OT EVAL LOW COMPLEX 30 MIN: CPT

## 2019-02-12 PROCEDURE — 97116 GAIT TRAINING THERAPY: CPT

## 2019-02-12 PROCEDURE — 700112 HCHG RX REV CODE 229: Performed by: ORTHOPAEDIC SURGERY

## 2019-02-12 PROCEDURE — 97110 THERAPEUTIC EXERCISES: CPT

## 2019-02-12 PROCEDURE — 36415 COLL VENOUS BLD VENIPUNCTURE: CPT

## 2019-02-12 PROCEDURE — G0378 HOSPITAL OBSERVATION PER HR: HCPCS

## 2019-02-12 PROCEDURE — 96366 THER/PROPH/DIAG IV INF ADDON: CPT

## 2019-02-12 PROCEDURE — 80048 BASIC METABOLIC PNL TOTAL CA: CPT

## 2019-02-12 PROCEDURE — 700102 HCHG RX REV CODE 250 W/ 637 OVERRIDE(OP): Performed by: ORTHOPAEDIC SURGERY

## 2019-02-12 RX ADMIN — POLYETHYLENE GLYCOL 3350 1 PACKET: 17 POWDER, FOR SOLUTION ORAL at 06:25

## 2019-02-12 RX ADMIN — ACETAMINOPHEN 650 MG: 325 TABLET, FILM COATED ORAL at 06:21

## 2019-02-12 RX ADMIN — OXYCODONE HYDROCHLORIDE 5 MG: 5 TABLET ORAL at 08:38

## 2019-02-12 RX ADMIN — ASPIRIN 325 MG: 325 TABLET, DELAYED RELEASE ORAL at 06:22

## 2019-02-12 RX ADMIN — HYDROCHLOROTHIAZIDE 25 MG: 25 TABLET ORAL at 06:22

## 2019-02-12 RX ADMIN — SODIUM CHLORIDE 2 G: 9 INJECTION, SOLUTION INTRAVENOUS at 02:24

## 2019-02-12 RX ADMIN — DOCUSATE SODIUM 100 MG: 100 CAPSULE, LIQUID FILLED ORAL at 06:21

## 2019-02-12 RX ADMIN — FEXOFENADINE HYDROCHLORIDE 180 MG: 60 TABLET, FILM COATED ORAL at 06:22

## 2019-02-12 RX ADMIN — GABAPENTIN 300 MG: 300 CAPSULE ORAL at 06:21

## 2019-02-12 ASSESSMENT — COGNITIVE AND FUNCTIONAL STATUS - GENERAL
TOILETING: A LITTLE
SUGGESTED CMS G CODE MODIFIER DAILY ACTIVITY: CJ
HELP NEEDED FOR BATHING: A LITTLE
DRESSING REGULAR LOWER BODY CLOTHING: A LITTLE
DAILY ACTIVITIY SCORE: 21

## 2019-02-12 ASSESSMENT — GAIT ASSESSMENTS
DEVIATION: DECREASED HEEL STRIKE;DECREASED TOE OFF
DISTANCE (FEET): 100
GAIT LEVEL OF ASSIST: SUPERVISED
ASSISTIVE DEVICE: FRONT WHEEL WALKER

## 2019-02-12 ASSESSMENT — ACTIVITIES OF DAILY LIVING (ADL): TOILETING: INDEPENDENT

## 2019-02-12 ASSESSMENT — PATIENT HEALTH QUESTIONNAIRE - PHQ9
2. FEELING DOWN, DEPRESSED, IRRITABLE, OR HOPELESS: NOT AT ALL
1. LITTLE INTEREST OR PLEASURE IN DOING THINGS: NOT AT ALL
SUM OF ALL RESPONSES TO PHQ9 QUESTIONS 1 AND 2: 0

## 2019-02-12 NOTE — PROGRESS NOTES
Received report from NOC RN; assumed pt care. Pt A&Ox4, sitting up in bed. Pt states minimal pain. 5/10 to RT knee when walking. CMS in tacnt, pt able to dorsi/plantar flex w/out difficulty. Minimal drainage noted, MD informed, no interventions needed at this time. Pt notified to call for assistance.

## 2019-02-12 NOTE — PROGRESS NOTES
Bleeding to surgical incision/L knee when pt getting up to the bathroom   Reinforced with island dressing; polar ice back in place   Pt denied pain at this moment   Will continue to monitor

## 2019-02-12 NOTE — PROGRESS NOTES
Received report from day shift nurse.   Assumed pt care  Pt is A&Ox4, resting comfortably in chair.   Pt on 1L via nasal cannula.   o signs of SOB/respiratory distress.   Pt c/o pain to the surgical site/ L knee; given Oxycodone 10mg per MAR   Assessment completed, Labs noted, VSS.   Surgical dressing to L hip in place CDI, +CMS, + pulse, able to wiggle toes and dorsi/plantar flex.   Polar ice & SCDs on.    Educated pt the importance to call for assistance.   Fall precautions in place.   Call light & personal belongings within reach.   Continue to monitor.

## 2019-02-12 NOTE — PROGRESS NOTES
Discharging pt home per MD order. Discussed discharge instructions, follow up appointments, prescriptions, and home care for Total Knee. Pt voiding and ambulating without difficulty, pain controlled, tolerating diet. Family at bedside, all questions answered. Pt discharged off unit with hospital escort at 1020.

## 2019-02-12 NOTE — THERAPY
"Physical Therapy treatment completed.   Bed Mobility:  Supine to Sit:  (NT, pt sitting up in chair)  Transfers: Sit to Stand: Supervised  Gait: Level Of Assist: Supervised with Front-Wheel Walker       Plan of Care: Patient with no further skilled PT needs in the acute care setting at this time  Discharge Recommendations: Equipment: No Equipment Needed. Post-acute therapy Discharge to home with outpatient or home health for additional skilled therapy services.    Pt steady with FWW, stairs reviewed, HEP reviewed, pt is ready to DC home with dtr assist when medically cleared. DC PT.    See \"Rehab Therapy-Acute\" Patient Summary Report for complete documentation.     "

## 2019-02-12 NOTE — PROGRESS NOTES
"Post op day # 1    No New Complaints, mild pain, well controlled.  Ambulating well    Blood pressure 124/56, pulse 64, temperature 36.6 °C (97.9 °F), temperature source Oral, resp. rate 18, height 1.702 m (5' 7\"), weight 69.4 kg (153 lb), SpO2 98 %.    Neurovascular intact  Wound Clean and Dry    Recent Labs      02/12/19   0424   WBC  12.2*   RBC  3.68*   HEMOGLOBIN  10.9*   HEMATOCRIT  32.6*   MCV  88.6   MCH  29.6   MCHC  33.4*   RDW  44.4   PLATELETCT  197   MPV  10.8     Recent Labs      02/12/19   0424   SODIUM  134*   POTASSIUM  3.5*   CHLORIDE  100   CO2  26   GLUCOSE  123*   BUN  17   CREATININE  1.03   CALCIUM  8.7       Plan: Home after am PT.  WBAT on left, aggressive ROM left knee.  Please remove IV before discharge.  I will arrange outpt PT.  May shower with incision covered.  bid  "

## 2019-02-12 NOTE — CARE PLAN
Problem: Infection  Goal: Will remain free from infection  Outcome: PROGRESSING AS EXPECTED  Pt is afebrile (97.6*F) at this moment   Bleeding noted to the surgical incision/ Lknee when pt getting up to the bathroom ; island dressing used to reinforce   No signs and symptoms of infection noted   Will continue to monitor       Problem: Venous Thromboembolism (VTW)/Deep Vein Thrombosis (DVT) Prevention:  Goal: Patient will participate in Venous Thrombosis (VTE)/Deep Vein Thrombosis (DVT)Prevention Measures  Outcome: PROGRESSING AS EXPECTED   02/11/19 2325   Mechanical/VTE Prophylaxis   Mechanical Prophylaxis  SCDs, Sequential Compression Device   SCDs, Sequential Compression Device On   OTHER   Risk Assessment Score 2   VTE RISK Moderate   Pharmacologic Prophylaxis Used (asa)       Problem: Pain Management  Goal: Pain level will decrease to patient's comfort goal  Outcome: PROGRESSING AS EXPECTED   02/11/19 2325 02/12/19 0200   OTHER   Pain Rating Scale (NPRS) 3 --    Non Verbal Scale  --  Calm   Comfort Goal Comfort at Rest;Comfort with Movement;Sleep Comfortably --    Prn Oxycodone 10mg given for c/o pain to the surgical site/L knee  Polar ice in place   Reminded pt to report to the nurse if pain remains uncontrolled;pt verbalized understanding

## 2019-02-12 NOTE — THERAPY
"Occupational Therapy Evaluation completed.   Functional Status: Pt is an 86 y/o female, admit for a L TKA. Pt lives with her daughter, who can assist with care after d/c. Pt PLOF- Active and I with AMB ADLS. Pt presents with minimal c/o pain with ADLS and functional mobility, is at the Supervised to SBA level. Pt was educated regarding techniques for ADLS and functional mobility, post surgery. Pt will be seen for initial evaluation and treatment only, as she is functional and safe in this setting.   Plan of Care: Patient with no further skilled OT needs in the acute care setting at this time  Discharge Recommendations:  Equipment: No Equipment Needed. Post-acute therapy Discharge to home with outpatient or home health for additional skilled therapy services.    See \"Rehab Therapy-Acute\" Patient Summary Report for complete documentation.    "

## 2019-02-12 NOTE — DISCHARGE SUMMARY
DATE OF ADMISSION:  02/11/2019    DATE OF DISCHARGE:  02/12/2019    ADMITTING DIAGNOSIS:  Severe osteoarthritis, left knee.    DISCHARGE DIAGNOSIS:  Severe osteoarthritis, left knee.    PROCEDURE:  Left total knee replacement on 02/11/2019.    INFECTIONS:  None.    COMPLICATIONS:  None.    CONDITION ON DISCHARGE:  Stable.    ADMITTING INFORMATION:  Please see dictated note.    HOSPITAL COURSE:  After appropriate preoperative evaluation, the patient was   taken to the operating room on 02/11/2019 where the above listed procedure was   performed under general anesthetic agent.  The patient tolerated the   procedure well and was taken to recovery room, then to the floor in stable   condition.  She received 24 hours of perioperative Ancef and was placed on   aspirin for DVT prophylaxis as well as use of mechanical prophylaxis.  She was   begun on ambulation on the afternoon of surgery and by postoperative day #1,   was comfortable on p.o. pain medication and independently ambulatory with her   walker.  She has achieved range of motion of 0/2/90 in the left knee with good   quad control.  She has a soft, nontender calf and intact distal neurologic   and vascular exam with clean, dry wound and stable labs.  She will be   discharged to home after morning therapy on postoperative day #1.    DISPOSITION:  Patient will be discharged home today and will return to see me   in 2 weeks for wound check and staple removal.  She may be weightbearing as   tolerated on the left leg and has been instructed to work vigorously on range   of motion both flexion and extension for the left knee.  Arrangements have   been made for outpatient physical therapy that will begin this week.  She may   shower with her Aquacel dressing in place.  The Aquacel should be removed in   about 5 days and then the wound should be kept clean and dry when showering.    We should be notified if there are problems with increasing pain, drainage,   evidence of  infection, neurovascular compromise or other significant concern.    She has a prescription for pain medication at home and will resume her normal   preoperative pain medications.  She will take an aspirin 325 mg p.o. b.i.d.   for DVT prophylaxis.       ____________________________________     MD ROSA Ragsdale / MARCELA    DD:  02/12/2019 08:26:34  DT:  02/12/2019 09:03:21    D#:  [id number]  Job#:  [id number]

## 2019-02-12 NOTE — DISCHARGE INSTRUCTIONS
Home after am PT.  WBAT on left, aggressive ROM left knee.  Please remove IV  before discharge.  I will arrange outpt PT.  Change aquacel in 5 days.  May shower with incision covered.  bid    Discharge Instructions    Discharged to home by car with relative. Discharged via wheelchair, hospital escort: Yes.  Special equipment needed: Not Applicable    Be sure to schedule a follow-up appointment with your primary care doctor or any specialists as instructed.     Discharge Plan:   Influenza Vaccine Indication: Not indicated: Previously immunized this influenza season and > 8 years of age    I understand that a diet low in cholesterol, fat, and sodium is recommended for good health. Unless I have been given specific instructions below for another diet, I accept this instruction as my diet prescription.   Other diet: Regular    Special Instructions: Discharge instructions for the Orthopedic Patient    Follow up with Primary Care Physician within 2 weeks of discharge to home, regarding:  Review of medications and diagnostic testing.  Surveillance for medical complications.  Workup and treatment of osteoporosis, if appropriate.     -Is this a Joint Replacement patient? Yes Total Joint Knee Replacement Discharge Instructions    Pain  - The goal is to slowly wean off the prescription pain medicine.  - Ice can be used for pain control.  20 minutes at a time is recommended, and never directly against your skin or incision.  - Most patients are off the pain pills by 3 weeks; others may require a low level of pain medications for many months.  If your pain continues to be severe, follow up with your physician.  Infection    Knee joint infections; occur in fewer than 2% of patients. The most common causes of infection following total knee replacement surgery are from bacteria that enter the bloodstream during dental procedures, urinary tract infections, or skin infections. These bacteria can lodge around your knee  replacement and cause an infection.  - Keep the incision as clean and dry as possible.  - Always wash your hands before touching your incision.  - Skin infections tend to develop around 7-10 days after surgery; most can be treated with oral antibiotics.  - Dental Care should be delayed for 3 months after surgery, your surgeon recommends taking a dose of antibiotics 1 hour prior to any dental procedure. After 2 years, most surgeons recommend antibiotics only before an extensive procedure.  Ask your surgeon what he recommends.  - Signs and symptoms of infection can include:  low grade fever, redness, pain, swelling and drainage from your incision.  Notify your surgeon immediately if you develop any of these symptoms.  Other instructions  - Bowel habits - constipation is extremely common and is caused by a combination of anesthesia, lack of mobility and pain medicine.  Use stool softeners or laxatives if necessary. It is important not to ignore this problem, as bowel obstructions can be a serious complication after joint replacement surgery.  - Mood/Energy Level - Many patients experience a lack of energy and endurance for up to 2-3 months after surgery.  Some may also feel down and can even become depressed.  This is likely due to the postoperative anemia, change in activity level, lack of sleep, pain medicine and just the emotional reaction to the surgery itself that is a big disruption in a person’s life.  This usually passes.  If symptoms persist, follow up with your primary physician.  - Returning to work - Your surgeon will give you more specific instructions. Depending on the type of activities you perform, it may be 6 to 8 weeks before you return to work.   Generally, if you work a sedentary job requiring little standing or walking, most patients may return within 2-6 weeks.  Manual labor jobs involving walking, lifting and standing may take longer. Your surgeon’s office can provide a release to part-time or  light duty work early on in your recovery and progress you to full duty as able.    - Driving - If your left knee was replaced and you have an automatic transmission, you may be able to begin driving in a week or so, provided you are no longer taking narcotic pain medication. If your right knee was replaced, avoid driving for 6 to 8 weeks. Remember that your reflexes may not be as sharp as before your surgery. Ask your surgeon for specific instructions.   - Avoiding falls - A fall during the first few weeks after surgery can damage your new knee and may result in a need for further surgery.   throw rugs and tack down loose carpeting.  Be aware of floor hazards such as pets, small objects or uneven surfaces.    - [employer] Metal Detectors - The sensitivity of metal detectors varies and it is likely that your prosthesis will cause an alarm.  Inform the  of your artificial joint.  Diet  - Resume your normal diet as tolerated.  - It is important to achieve a healthy nutritional status by eating a well balanced diet on a regular basis.  - Your physician may recommend that you take iron and vitamin supplements.   - Continue to drink plenty of fluids.  Shower/Bathing  - You may shower as soon as you get home from the hospital unless otherwise instructed.  - Keep your incision out of water.  To keep the incision dry when showering, cover it with a plastic bag or plastic wrap.  - Pat incision dry if it gets wet.  Don’t rub.  - Do not submerge in a bath until staples are out and the incision is completely healed. (Approximately 6-8 weeks)  Dressing Change:  Procedure (if recommended by your physician)  - Wash hands.  - Open all new dressing change materials.  - Remove old dressing and discard.  - Inspect incision for redness, increase in clear drainage, yellow/green drainage, odor and surrounding skin hot to touch.  -  ABD (large gauze) pad or “island dressing” by one corner and lay over the  incision.  Be careful not to touch the inside of the dressing that will lay over the incision.  - Secure in place as instructed (Ace wrap or tape).    Swelling/Bruising    - Swelling can last from 3-6 months.  - Elevate your leg higher than your heart while reclining.   The first week you are home you should elevate your leg an equal amount of time, as you are active.    - Anti-inflammatory pills can be taken once you have stopped the blood thinners.  - The swelling is usually worse after you go home since you are upright for longer periods of time.  - Bruising is common and can involve the entire leg including the thigh, calf and even foot. Bruising often does not appear until after you arrive home and it can be quite dramatic- purple, black, and green.  The bruising you can see is not usually concerning and will subside without any treatment.      Blood Clot Prevention  Blood clots in the legs and the less common, but frightening, clots that travel to the lungs are a real focus of our preventative. Most patients are at standard risk for them, but those patients who are at higher risk include people who have had previous clots, a family history of clotting, smoking, diabetes, obesity, advanced age, use of estrogen and a sedentary lifestyle.    - Signs of blood clots in legs - Swelling in thigh, calf or ankle that does not go down with elevation.  Pain, heat and tenderness in calf, back of calf or groin area.  NOTE: blood clots can occur in either leg.  - You have been receiving anticoagulant therapy (blood thinners) in the hospital and you may be instructed to continue at home depending on your risk factors.  - Your risk for developing a clot continues for up to 2-3 months after surgery.  You should avoid prolonged sitting and dehydration during that time (long air trips and car trips).  If you do take a trip during this time, please get up and move around every 1- 1.5 hours.  - If you are prescribed blood-thinning  medication for home, follow instructions as directed. (Handouts provided if applicable).      Activity  Once home, you should continue to stay active. The key is to remember not to overdo it! While you can expect some good days and some bad days, you should notice a gradual improvement and a gradual increase in your endurance over the next 6 to 12 months. Exercise is a critical component of home care, particularly during the first few weeks after surgery.     - Normal activities of daily living You should be able to resume most within 3 to 6 weeks following surgery. Some pain with activity and at night is common for several weeks after surgery  -  Physical Therapy Exercises - Continue to do the exercises prescribed for at least two months after surgery. Riding a stationary bicycle can help maintain muscle tone and keep your knee flexible. Try to achieve the maximum degree of bending and extension possible. (handout provided by Therapist).  - Sexual Activity -. Your surgeon can tell you when it safe to resume sexual activity.    - Sleeping Positions - You can safely sleep on your back, on either side, or on your stomach.   - Other Activities - Walk as much as you like, but remember that walking is no substitute for the exercises your doctor and physical therapist will prescribe. Lower impact activities are preferred.  If you have specific questions, consult your Surgeon.    When to Call the Doctor   Call the physician if:   - Fever over 100.5? F  - Increased pain, drainage, redness, odor or heat around the incision area  - Shaking chills  - Increased knee pain with activity and rest  - Increased pain in calf, tenderness or redness above or below the knee  - Increased swelling of calf, ankle, foot  - Sudden increased shortness of breath, sudden onset of chest pain, localized chest pain with coughing  - Incision opening  Or, if there are any questions or concerns about medications or care.       -Is this patient being  discharged with medication to prevent blood clots?  Yes, Aspirin Aspirin, ASA oral tablets  What is this medicine?  ASPIRIN (AS pir in) is a pain reliever. It is used to treat mild pain and fever. This medicine is also used as directed by a doctor to prevent and to treat heart attacks, to prevent strokes, and to treat arthritis or inflammation.  This medicine may be used for other purposes; ask your health care provider or pharmacist if you have questions.  COMMON BRAND NAME(S): Aspir-Low, Aspir-Sheila, Aspirtab, Jazmyn Advanced Aspirin, Jazmyn Aspirin, Jazmyn Aspirin Extra Strength, Jazmyn Aspirin Plus, Jazmyn Extra Strength, Jazmyn Extra Strength Plus, Jazmyn Genuine Aspirin, Jazmyn Womens Aspirin, Bufferin, Bufferin Extra Strength, Bufferin Low Dose  What should I tell my health care provider before I take this medicine?  They need to know if you have any of these conditions:  -anemia  -asthma  -bleeding problems  -child with chickenpox, the flu, or other viral infection  -diabetes  -gout  -if you frequently drink alcohol containing drinks  -kidney disease  -liver disease  -low level of vitamin K  -lupus  -smoke tobacco  -stomach ulcers or other problems  -an unusual or allergic reaction to aspirin, tartrazine dye, other medicines, dyes, or preservatives  -pregnant or trying to get pregnant  -breast-feeding  How should I use this medicine?  Take this medicine by mouth with a glass of water. Follow the directions on the package or prescription label. You can take this medicine with or without food. If it upsets your stomach, take it with food. Do not take your medicine more often than directed.  Talk to your pediatrician regarding the use of this medicine in children. While this drug may be prescribed for children as young as 12 years of age for selected conditions, precautions do apply. Children and teenagers should not use this medicine to treat chicken pox or flu symptoms unless directed by a doctor.  Patients over 65  years old may have a stronger reaction and need a smaller dose.  Overdosage: If you think you have taken too much of this medicine contact a poison control center or emergency room at once.  NOTE: This medicine is only for you. Do not share this medicine with others.  What if I miss a dose?  If you are taking this medicine on a regular schedule and miss a dose, take it as soon as you can. If it is almost time for your next dose, take only that dose. Do not take double or extra doses.  What may interact with this medicine?  Do not take this medicine with any of the following medications:  -cidofovir  -ketorolac  -probenecid  This medicine may also interact with the following medications:  -alcohol  -alendronate  -bismuth subsalicylate  -flavocoxid  -herbal supplements like feverfew, garlic, malinda, ginkgo biloba, horse chestnut  -medicines for diabetes or glaucoma like acetazolamide, methazolamide  -medicines for gout  -medicines that treat or prevent blood clots like enoxaparin, heparin, ticlopidine, warfarin  -other aspirin and aspirin-like medicines  -NSAIDs, medicines for pain and inflammation, like ibuprofen or naproxen  -pemetrexed  -sulfinpyrazone  -varicella live vaccine  This list may not describe all possible interactions. Give your health care provider a list of all the medicines, herbs, non-prescription drugs, or dietary supplements you use. Also tell them if you smoke, drink alcohol, or use illegal drugs. Some items may interact with your medicine.  What should I watch for while using this medicine?  If you are treating yourself for pain, tell your doctor or health care professional if the pain lasts more than 10 days, if it gets worse, or if there is a new or different kind of pain. Tell your doctor if you see redness or swelling. Also, check with your doctor if you have a fever that lasts for more than 3 days. Only take this medicine to prevent heart attacks or blood clotting if prescribed by your  doctor or health care professional.  Do not take aspirin or aspirin-like medicines with this medicine. Too much aspirin can be dangerous. Always read the labels carefully.  This medicine can irritate your stomach or cause bleeding problems. Do not smoke cigarettes or drink alcohol while taking this medicine. Do not lie down for 30 minutes after taking this medicine to prevent irritation to your throat.  If you are scheduled for any medical or dental procedure, tell your healthcare provider that you are taking this medicine. You may need to stop taking this medicine before the procedure.  This medicine may be used to treat migraines. If you take migraine medicines for 10 or more days a month, your migraines may get worse. Keep a diary of headache days and medicine use. Contact your healthcare professional if your migraine attacks occur more frequently.  What side effects may I notice from receiving this medicine?  Side effects that you should report to your doctor or health care professional as soon as possible:  -allergic reactions like skin rash, itching or hives, swelling of the face, lips, or tongue  -breathing problems  -changes in hearing, ringing in the ears  -confusion  -general ill feeling or flu-like symptoms  -pain on swallowing  -redness, blistering, peeling or loosening of the skin, including inside the mouth or nose  -signs and symptoms of bleeding such as bloody or black, tarry stools; red or dark-brown urine; spitting up blood or brown material that looks like coffee grounds; red spots on the skin; unusual bruising or bleeding from the eye, gums, or nose  -trouble passing urine or change in the amount of urine  -unusually weak or tired  -yellowing of the eyes or skin  Side effects that usually do not require medical attention (report to your doctor or health care professional if they continue or are bothersome):  -diarrhea or constipation  -headache  -nausea, vomiting  -stomach gas, heartburn  This  list may not describe all possible side effects. Call your doctor for medical advice about side effects. You may report side effects to FDA at 0-208-KXR-2027.  Where should I keep my medicine?  Keep out of the reach of children.  Store at room temperature between 15 and 30 degrees C (59 and 86 degrees F). Protect from heat and moisture. Do not use this medicine if it has a strong vinegar smell. Throw away any unused medicine after the expiration date.  NOTE: This sheet is a summary. It may not cover all possible information. If you have questions about this medicine, talk to your doctor, pharmacist, or health care provider.  © 2018 [employer]/Gold Standard (2014-08-19 11:30:31)      · Is patient discharged on Warfarin / Coumadin?   No     Depression / Suicide Risk    As you are discharged from this [facility] facility, it is important to learn how to keep safe from harming yourself.    Recognize the warning signs:  · Abrupt changes in personality, positive or negative- including increase in energy   · Giving away possessions  · Change in eating patterns- significant weight changes-  positive or negative  · Change in sleeping patterns- unable to sleep or sleeping all the time   · Unwillingness or inability to communicate  · Depression  · Unusual sadness, discouragement and loneliness  · Talk of wanting to die  · Neglect of personal appearance   · Rebelliousness- reckless behavior  · Withdrawal from people/activities they love  · Confusion- inability to concentrate     If you or a loved one observes any of these behaviors or has concerns about self-harm, here's what you can do:  · Talk about it- your feelings and reasons for harming yourself  · Remove any means that you might use to hurt yourself (examples: pills, rope, extension cords, firearm)  · Get professional help from the community (Mental Health, Substance Abuse, psychological counseling)  · Do not be alone:Call your Safe Contact- someone whom you trust who  will be there for you.  · Call your local CRISIS HOTLINE 969-3099 or 861-464-3722  · Call your local Children's Mobile Crisis Response Team [place] (384) 477-6117 or www.[employer]  · Call the toll free National Suicide Prevention Hotlines   · National Suicide Prevention Lifeline 852-542-YUSK (9939)  · National [employer] Line Network 800-SUICIDE (113-4287)

## 2021-01-15 DIAGNOSIS — Z23 NEED FOR VACCINATION: ICD-10-CM

## 2021-01-20 ENCOUNTER — NURSE TRIAGE (OUTPATIENT)
Dept: HEALTH INFORMATION MANAGEMENT | Facility: OTHER | Age: 86
End: 2021-01-20

## 2021-01-27 ENCOUNTER — IMMUNIZATION (OUTPATIENT)
Dept: FAMILY PLANNING/WOMEN'S HEALTH CLINIC | Facility: IMMUNIZATION CENTER | Age: 86
End: 2021-01-27
Attending: INTERNAL MEDICINE
Payer: MEDICARE

## 2021-01-27 DIAGNOSIS — Z23 NEED FOR VACCINATION: ICD-10-CM

## 2021-01-27 DIAGNOSIS — Z23 ENCOUNTER FOR VACCINATION: Primary | ICD-10-CM

## 2021-01-27 PROCEDURE — 0001A PFIZER SARS-COV-2 VACCINE: CPT

## 2021-01-27 PROCEDURE — 91300 PFIZER SARS-COV-2 VACCINE: CPT

## 2021-02-19 ENCOUNTER — IMMUNIZATION (OUTPATIENT)
Dept: FAMILY PLANNING/WOMEN'S HEALTH CLINIC | Facility: IMMUNIZATION CENTER | Age: 86
End: 2021-02-19
Attending: INTERNAL MEDICINE
Payer: MEDICARE

## 2021-02-19 DIAGNOSIS — Z23 ENCOUNTER FOR VACCINATION: Primary | ICD-10-CM

## 2021-02-19 PROCEDURE — 91300 PFIZER SARS-COV-2 VACCINE: CPT

## 2021-02-19 PROCEDURE — 0002A PFIZER SARS-COV-2 VACCINE: CPT

## 2021-06-30 ENCOUNTER — HOSPITAL ENCOUNTER (OUTPATIENT)
Dept: RADIOLOGY | Facility: MEDICAL CENTER | Age: 86
End: 2021-06-30
Attending: SPECIALIST
Payer: MEDICARE

## 2021-06-30 DIAGNOSIS — R43.9 DISTURBANCES OF SENSATION OF SMELL AND TASTE: ICD-10-CM

## 2021-06-30 PROCEDURE — 70486 CT MAXILLOFACIAL W/O DYE: CPT | Mod: MH

## 2021-07-01 ENCOUNTER — PRE-ADMISSION TESTING (OUTPATIENT)
Dept: ADMISSIONS | Facility: MEDICAL CENTER | Age: 86
End: 2021-07-01
Attending: SPECIALIST
Payer: MEDICARE

## 2021-07-01 DIAGNOSIS — Z01.810 PRE-OPERATIVE CARDIOVASCULAR EXAMINATION: ICD-10-CM

## 2021-07-01 DIAGNOSIS — Z01.812 PRE-OPERATIVE LABORATORY EXAMINATION: ICD-10-CM

## 2021-07-01 LAB
ANION GAP SERPL CALC-SCNC: 11 MMOL/L (ref 7–16)
BUN SERPL-MCNC: 14 MG/DL (ref 8–22)
CALCIUM SERPL-MCNC: 9.6 MG/DL (ref 8.5–10.5)
CHLORIDE SERPL-SCNC: 93 MMOL/L (ref 96–112)
CO2 SERPL-SCNC: 28 MMOL/L (ref 20–33)
CREAT SERPL-MCNC: 0.97 MG/DL (ref 0.5–1.4)
EKG IMPRESSION: NORMAL
ERYTHROCYTE [DISTWIDTH] IN BLOOD BY AUTOMATED COUNT: 44.2 FL (ref 35.9–50)
GLUCOSE SERPL-MCNC: 112 MG/DL (ref 65–99)
HCT VFR BLD AUTO: 46.9 % (ref 37–47)
HGB BLD-MCNC: 15.4 G/DL (ref 12–16)
MCH RBC QN AUTO: 28.8 PG (ref 27–33)
MCHC RBC AUTO-ENTMCNC: 32.8 G/DL (ref 33.6–35)
MCV RBC AUTO: 87.7 FL (ref 81.4–97.8)
PLATELET # BLD AUTO: 256 K/UL (ref 164–446)
PMV BLD AUTO: 11 FL (ref 9–12.9)
POTASSIUM SERPL-SCNC: 3.8 MMOL/L (ref 3.6–5.5)
RBC # BLD AUTO: 5.35 M/UL (ref 4.2–5.4)
SODIUM SERPL-SCNC: 132 MMOL/L (ref 135–145)
WBC # BLD AUTO: 7 K/UL (ref 4.8–10.8)

## 2021-07-01 PROCEDURE — 93010 ELECTROCARDIOGRAM REPORT: CPT | Performed by: INTERNAL MEDICINE

## 2021-07-01 PROCEDURE — 85027 COMPLETE CBC AUTOMATED: CPT

## 2021-07-01 PROCEDURE — 80048 BASIC METABOLIC PNL TOTAL CA: CPT

## 2021-07-01 PROCEDURE — 36415 COLL VENOUS BLD VENIPUNCTURE: CPT

## 2021-07-01 PROCEDURE — 93005 ELECTROCARDIOGRAM TRACING: CPT

## 2021-07-01 RX ORDER — CHLORHEXIDINE GLUCONATE ORAL RINSE 1.2 MG/ML
15 SOLUTION DENTAL PRN
COMMUNITY
End: 2021-11-18

## 2021-07-15 ENCOUNTER — ANESTHESIA (OUTPATIENT)
Dept: SURGERY | Facility: MEDICAL CENTER | Age: 86
End: 2021-07-15
Payer: MEDICARE

## 2021-07-15 ENCOUNTER — ANESTHESIA EVENT (OUTPATIENT)
Dept: SURGERY | Facility: MEDICAL CENTER | Age: 86
End: 2021-07-15
Payer: MEDICARE

## 2021-07-15 ENCOUNTER — HOSPITAL ENCOUNTER (OUTPATIENT)
Facility: MEDICAL CENTER | Age: 86
End: 2021-07-15
Attending: SPECIALIST | Admitting: SPECIALIST
Payer: MEDICARE

## 2021-07-15 VITALS
TEMPERATURE: 98.2 F | HEART RATE: 60 BPM | BODY MASS INDEX: 25.12 KG/M2 | OXYGEN SATURATION: 91 % | SYSTOLIC BLOOD PRESSURE: 148 MMHG | HEIGHT: 66 IN | WEIGHT: 156.31 LBS | DIASTOLIC BLOOD PRESSURE: 88 MMHG | RESPIRATION RATE: 16 BRPM

## 2021-07-15 DIAGNOSIS — D37.05 NEOPLASM OF UNCERTAIN BEHAVIOR OF TONSIL: ICD-10-CM

## 2021-07-15 LAB — PATHOLOGY CONSULT NOTE: NORMAL

## 2021-07-15 PROCEDURE — 160009 HCHG ANES TIME/MIN: Performed by: SPECIALIST

## 2021-07-15 PROCEDURE — 88342 IMHCHEM/IMCYTCHM 1ST ANTB: CPT

## 2021-07-15 PROCEDURE — 700102 HCHG RX REV CODE 250 W/ 637 OVERRIDE(OP): Performed by: ANESTHESIOLOGY

## 2021-07-15 PROCEDURE — 88341 IMHCHEM/IMCYTCHM EA ADD ANTB: CPT | Mod: XU

## 2021-07-15 PROCEDURE — 160035 HCHG PACU - 1ST 60 MINS PHASE I: Performed by: SPECIALIST

## 2021-07-15 PROCEDURE — 700111 HCHG RX REV CODE 636 W/ 250 OVERRIDE (IP): Performed by: ANESTHESIOLOGY

## 2021-07-15 PROCEDURE — 160036 HCHG PACU - EA ADDL 30 MINS PHASE I: Performed by: SPECIALIST

## 2021-07-15 PROCEDURE — 88304 TISSUE EXAM BY PATHOLOGIST: CPT

## 2021-07-15 PROCEDURE — 502573 HCHG PACK, ENT: Performed by: SPECIALIST

## 2021-07-15 PROCEDURE — 160047 HCHG PACU  - EA ADDL 30 MINS PHASE II: Performed by: SPECIALIST

## 2021-07-15 PROCEDURE — 88360 TUMOR IMMUNOHISTOCHEM/MANUAL: CPT

## 2021-07-15 PROCEDURE — 160046 HCHG PACU - 1ST 60 MINS PHASE II: Performed by: SPECIALIST

## 2021-07-15 PROCEDURE — 160002 HCHG RECOVERY MINUTES (STAT): Performed by: SPECIALIST

## 2021-07-15 PROCEDURE — 160038 HCHG SURGERY MINUTES - EA ADDL 1 MIN LEVEL 2: Performed by: SPECIALIST

## 2021-07-15 PROCEDURE — 160025 RECOVERY II MINUTES (STATS): Performed by: SPECIALIST

## 2021-07-15 PROCEDURE — A9270 NON-COVERED ITEM OR SERVICE: HCPCS | Performed by: ANESTHESIOLOGY

## 2021-07-15 PROCEDURE — 501838 HCHG SUTURE GENERAL: Performed by: SPECIALIST

## 2021-07-15 PROCEDURE — 160048 HCHG OR STATISTICAL LEVEL 1-5: Performed by: SPECIALIST

## 2021-07-15 PROCEDURE — 88309 TISSUE EXAM BY PATHOLOGIST: CPT

## 2021-07-15 PROCEDURE — 700105 HCHG RX REV CODE 258: Performed by: SPECIALIST

## 2021-07-15 PROCEDURE — 160027 HCHG SURGERY MINUTES - 1ST 30 MINS LEVEL 2: Performed by: SPECIALIST

## 2021-07-15 PROCEDURE — 501424 HCHG SPONGE, TONSIL: Performed by: SPECIALIST

## 2021-07-15 PROCEDURE — 700101 HCHG RX REV CODE 250: Performed by: SPECIALIST

## 2021-07-15 PROCEDURE — 500257: Performed by: SPECIALIST

## 2021-07-15 RX ORDER — MEPERIDINE HYDROCHLORIDE 25 MG/ML
6.25 INJECTION INTRAMUSCULAR; INTRAVENOUS; SUBCUTANEOUS
Status: DISCONTINUED | OUTPATIENT
Start: 2021-07-15 | End: 2021-07-15 | Stop reason: HOSPADM

## 2021-07-15 RX ORDER — DIPHENHYDRAMINE HYDROCHLORIDE 50 MG/ML
12.5 INJECTION INTRAMUSCULAR; INTRAVENOUS
Status: DISCONTINUED | OUTPATIENT
Start: 2021-07-15 | End: 2021-07-15 | Stop reason: HOSPADM

## 2021-07-15 RX ORDER — HALOPERIDOL 5 MG/ML
1 INJECTION INTRAMUSCULAR
Status: DISCONTINUED | OUTPATIENT
Start: 2021-07-15 | End: 2021-07-15 | Stop reason: HOSPADM

## 2021-07-15 RX ORDER — SODIUM CHLORIDE, SODIUM LACTATE, POTASSIUM CHLORIDE, CALCIUM CHLORIDE 600; 310; 30; 20 MG/100ML; MG/100ML; MG/100ML; MG/100ML
INJECTION, SOLUTION INTRAVENOUS CONTINUOUS
Status: DISCONTINUED | OUTPATIENT
Start: 2021-07-15 | End: 2021-07-15 | Stop reason: HOSPADM

## 2021-07-15 RX ORDER — OXYCODONE HCL 5 MG/5 ML
10 SOLUTION, ORAL ORAL
Status: COMPLETED | OUTPATIENT
Start: 2021-07-15 | End: 2021-07-15

## 2021-07-15 RX ORDER — SODIUM CHLORIDE, SODIUM LACTATE, POTASSIUM CHLORIDE, CALCIUM CHLORIDE 600; 310; 30; 20 MG/100ML; MG/100ML; MG/100ML; MG/100ML
INJECTION, SOLUTION INTRAVENOUS CONTINUOUS
Status: ACTIVE | OUTPATIENT
Start: 2021-07-15 | End: 2021-07-15

## 2021-07-15 RX ORDER — BUPIVACAINE HYDROCHLORIDE AND EPINEPHRINE 2.5; 5 MG/ML; UG/ML
INJECTION, SOLUTION EPIDURAL; INFILTRATION; INTRACAUDAL; PERINEURAL
Status: DISCONTINUED | OUTPATIENT
Start: 2021-07-15 | End: 2021-07-15 | Stop reason: HOSPADM

## 2021-07-15 RX ORDER — OXYCODONE HCL 5 MG/5 ML
5 SOLUTION, ORAL ORAL
Status: COMPLETED | OUTPATIENT
Start: 2021-07-15 | End: 2021-07-15

## 2021-07-15 RX ORDER — BUPIVACAINE HYDROCHLORIDE 2.5 MG/ML
INJECTION, SOLUTION EPIDURAL; INFILTRATION; INTRACAUDAL
Status: DISCONTINUED
Start: 2021-07-15 | End: 2021-07-15 | Stop reason: HOSPADM

## 2021-07-15 RX ORDER — ONDANSETRON 2 MG/ML
4 INJECTION INTRAMUSCULAR; INTRAVENOUS
Status: DISCONTINUED | OUTPATIENT
Start: 2021-07-15 | End: 2021-07-15 | Stop reason: HOSPADM

## 2021-07-15 RX ADMIN — Medication 100 MG: at 12:38

## 2021-07-15 RX ADMIN — SODIUM CHLORIDE, POTASSIUM CHLORIDE, SODIUM LACTATE AND CALCIUM CHLORIDE: 600; 310; 30; 20 INJECTION, SOLUTION INTRAVENOUS at 12:30

## 2021-07-15 RX ADMIN — FENTANYL CITRATE 25 MCG: 50 INJECTION, SOLUTION INTRAMUSCULAR; INTRAVENOUS at 15:27

## 2021-07-15 RX ADMIN — FENTANYL CITRATE 25 MCG: 50 INJECTION, SOLUTION INTRAMUSCULAR; INTRAVENOUS at 16:04

## 2021-07-15 RX ADMIN — FENTANYL CITRATE 100 MCG: 50 INJECTION, SOLUTION INTRAMUSCULAR; INTRAVENOUS at 12:38

## 2021-07-15 RX ADMIN — PROPOFOL 200 MG: 10 INJECTION, EMULSION INTRAVENOUS at 12:38

## 2021-07-15 RX ADMIN — FENTANYL CITRATE 25 MCG: 50 INJECTION, SOLUTION INTRAMUSCULAR; INTRAVENOUS at 13:59

## 2021-07-15 RX ADMIN — FENTANYL CITRATE 25 MCG: 50 INJECTION, SOLUTION INTRAMUSCULAR; INTRAVENOUS at 13:48

## 2021-07-15 RX ADMIN — OXYCODONE HYDROCHLORIDE 5 MG: 5 SOLUTION ORAL at 13:53

## 2021-07-15 NOTE — ANESTHESIA POSTPROCEDURE EVALUATION
Patient: Ina Small    Procedure Summary     Date: 07/15/21 Room / Location: [facility] ROOM 23 / SURGERY SAME DAY [facility]    Anesthesia Start: 1230 Anesthesia Stop: 1339    Procedure: TONSILLECTOMY. (N/A Throat) Diagnosis:       Swelling, mass, or lump in head and neck      (neoplasm behavior uncertian tonsil)    Surgeons: Bigg Ren M.D. Responsible Provider: Martin Doherty M.D.    Anesthesia Type: general ASA Status: 2          Final Anesthesia Type: general  Last vitals  BP   Blood Pressure : 159/67    Temp   36.3 °C (97.4 °F)    Pulse   76   Resp   16    SpO2   99 %      Anesthesia Post Evaluation    Patient location during evaluation: PACU  Patient participation: complete - patient participated  Level of consciousness: awake and alert    Airway patency: patent  Anesthetic complications: no  Cardiovascular status: hemodynamically stable  Respiratory status: acceptable  Hydration status: euvolemic    PONV: none          There were no known complications for this encounter.     Nurse Pain Score: 0 (NPRS)

## 2021-07-15 NOTE — ANESTHESIA TIME REPORT
Anesthesia Start and Stop Event Times     Date Time Event    7/15/2021 1217 Ready for Procedure     1230 Anesthesia Start     1339 Anesthesia Stop        Responsible Staff  07/15/21    Name Role Begin End    Martin Doherty M.D. Anesth 1230 1339        Preop Diagnosis (Free Text):  Pre-op Diagnosis     R22.0        Preop Diagnosis (Codes):  Diagnosis Information     Diagnosis Code(s): Swelling, mass, or lump in head and neck [R22.0, R22.1]        Post op Diagnosis  Tonsillar hypertrophy      Premium Reason  Non-Premium    Comments:

## 2021-07-15 NOTE — OR NURSING
1314: Pt to PACU from OR. Report from anesthesia and OR RN. Patient asleep, oral airway in place, 6 L oxygen via mask, oral airway in place, status post tonsillectomy. Respirations even and unlabored. VSS.     1321: oral air way d/c, patient switched to humidified air     1333: patient's daughter called and updated on status, questions answered.     1337: patient's throat assessed, small amount of bleeding, educated on refraining from clearing throat/hacking/coughing.     1348: medicated patient for pain to throat (see MAR for interventions)    1419: paged Dr. Doherty regarding patient's sbp in the 190s, sbp has been steadily climbing last few checks, per MD no IV meds at this point okay to go home with sbp in 190s    1436: patient tolerating PO, eating popsicle, 2 L oxygen via face tent, patient meets criteria for phase II    1536: patient changing into clothes,     1538: patient up to void    1640: patient meets criteria for d/c PIV removed, tip intact, daughter at bedside for discharge instructions, questions answered.     1644: patient escorted out via wheelchair with this RN and daughter, all belongings accounted for.

## 2021-07-15 NOTE — OR NURSING
1547: clarified with Dr. Ren about home prescription. MD currently in surgery at [facility], will input from his office once he is complete.    1554: updated daughter Yusra. Yusra olson to change pharmacy to [facility] in case prescription not ready before 7pm    1558: updated Dr. Ren's office, that patient's pharmacy changed to 24 hour [facility] at [address] [facility] d/t pt's preferred pharmacy closes at 7pm. Physician's assistant to notify Dr. Ren.    1604: IV fentanyl 25 mcg given for 5/10 pain.

## 2021-07-15 NOTE — DISCHARGE INSTRUCTIONS
ACTIVITY: Rest and take it easy for the first 24 hours.  A responsible adult is recommended to remain with you during that time.  It is normal to feel sleepy.  We encourage you to not do anything that requires balance, judgment or coordination.    MILD FLU-LIKE SYMPTOMS ARE NORMAL. YOU MAY EXPERIENCE GENERALIZED MUSCLE ACHES, THROAT IRRITATION, HEADACHE AND/OR SOME NAUSEA.    FOR 24 HOURS DO NOT:  Drive, operate machinery or run household appliances.  Drink beer or alcoholic beverages.   Make important decisions or sign legal documents.    SPECIAL INSTRUCTIONS: SEE ATTACHED HANDOUT    DIET: No restrictions, may resume normal diet. To avoid nausea, slowly advance diet as tolerated, avoiding spicy or greasy foods for the first day.  Add more substantial food to your diet according to your physician's instructions.   INCREASE FLUIDS AND FIBER TO AVOID CONSTIPATION.    SURGICAL DRESSING/BATHING: OK to shower tomorrow, avoid too hot/steamy showers.     FOLLOW-UP APPOINTMENT:  A follow-up appointment should be arranged with your doctor; call to schedule.    You should CALL YOUR PHYSICIAN if you develop:  Fever greater than 101 degrees F.  Pain not relieved by medication, or persistent nausea or vomiting.  Excessive bleeding (blood soaking through dressing) or unexpected drainage from the wound.  Extreme redness or swelling around the incision site, drainage of pus or foul smelling drainage.  Inability to urinate or empty your bladder within 8 hours.  Problems with breathing or chest pain.    You should call 911 if you develop problems with breathing or chest pain.  If you are unable to contact your doctor or surgical center, you should go to the nearest emergency room or urgent care center.      Physician's telephone #: Dr. Ren [phone]    If any questions arise, call your doctor.  If your doctor is not available, please feel free to call the Surgical Center at [phone]. The Contact Center is open Monday  through Friday 7AM to 5PM and may speak to a nurse at [phone], or toll free at [phone].     A registered nurse may call you a few days after your surgery to see how you are doing after your procedure.    MEDICATIONS: Resume taking daily medication.  Take prescribed pain medication with food.  If no medication is prescribed, you may take non-aspirin pain medication if needed.  PAIN MEDICATION CAN BE VERY CONSTIPATING.  Take a stool softener or laxative such as senokot, pericolace, or milk of magnesia if needed.    Last pain medication given Oxy at 2PM    If your physician has prescribed pain medication that includes Acetaminophen (Tylenol), do not take additional Acetaminophen (Tylenol) while taking the prescribed medication.    Depression / Suicide Risk    As you are discharged from this [facility] facility, it is important to learn how to keep safe from harming yourself.    Recognize the warning signs:  · Abrupt changes in personality, positive or negative- including increase in energy   · Giving away possessions  · Change in eating patterns- significant weight changes-  positive or negative  · Change in sleeping patterns- unable to sleep or sleeping all the time   · Unwillingness or inability to communicate  · Depression  · Unusual sadness, discouragement and loneliness  · Talk of wanting to die  · Neglect of personal appearance   · Rebelliousness- reckless behavior  · Withdrawal from people/activities they love  · Confusion- inability to concentrate     If you or a loved one observes any of these behaviors or has concerns about self-harm, here's what you can do:  · Talk about it- your feelings and reasons for harming yourself  · Remove any means that you might use to hurt yourself (examples: pills, rope, extension cords, firearm)  · Get professional help from the community (Mental Health, Substance Abuse, psychological counseling)  · Do not be alone:Call your Safe Contact- someone whom you trust  who will be there for you.  · Call your local CRISIS HOTLINE 196-2443 or 629-183-6068  · Call your local Children's Mobile Crisis Response Team [place] (156) 032-7118 or www.[employer]  · Call the toll free National Suicide Prevention Hotlines   · National Suicide Prevention Lifeline 042-348-YWLC (2926)  · National [employer] Line Network 800-SUICIDE (968-3629)

## 2021-07-15 NOTE — OR SURGEON
Immediate Post OP Note    PreOp Diagnosis: neoplasm left tonsil behavior uncertain      PostOp Diagnosis: same, path pending      Procedure(s):  TONSILLECTOMY. - Wound Class: Clean Contaminated    Surgeon(s):  Bigg Ren M.D.    Anesthesiologist/Type of Anesthesia:  Anesthesiologist: Martin Doherty M.D./General    Surgical Staff:  Cell Saver : Tom Chang  Circulator: Ariella Brandon R.N.  Scrub Person: Syd Hyatt    Specimens removed if any:  ID Type Source Tests Collected by Time Destination   A : left tonsil Tissue Tonsil PATHOLOGY SPECIMEN Bigg Ren M.D. 7/15/2021 10:27 AM    B : right tonsil Tissue Tonsil PATHOLOGY SPECIMEN Bigg Ren M.D. 7/15/2021 10:27 AM        Estimated Blood Loss: 20- 25 ml    Findings:     Complications: none        7/15/2021 1:12 PM Bigg Ren M.D.

## 2021-07-15 NOTE — ANESTHESIA PROCEDURE NOTES
Airway    Date/Time: 7/15/2021 12:38 PM  Performed by: Martin Doherty M.D.  Authorized by: Martin Doherty M.D.     Location:  OR  Urgency:  Elective  Indications for Airway Management:  Anesthesia      Spontaneous Ventilation: absent    Sedation Level:  Deep  Preoxygenated: Yes    Patient Position:  Sniffing  Final Airway Type:  Endotracheal airway  Final Endotracheal Airway:  ETT  Cuffed: Yes    Technique Used for Successful ETT Placement:  Direct laryngoscopy    Insertion Site:  Oral  Blade Type:  Nell  Laryngoscope Blade/Videolaryngoscope Blade Size:  3  ETT Size (mm):  7.0  Measured from:  Teeth  ETT to Teeth (cm):  22  Placement Verified by: auscultation and capnometry    Cormack-Lehane Classification:  Grade I - full view of glottis  Number of Attempts at Approach:  1

## 2021-07-23 ENCOUNTER — HOSPITAL ENCOUNTER (OUTPATIENT)
Dept: RADIOLOGY | Facility: MEDICAL CENTER | Age: 86
End: 2021-07-23
Attending: SPECIALIST
Payer: MEDICARE

## 2021-07-23 DIAGNOSIS — R22.0 INTRACRANIAL SWELLING: ICD-10-CM

## 2021-07-23 PROCEDURE — A9576 INJ PROHANCE MULTIPACK: HCPCS | Performed by: SPECIALIST

## 2021-07-23 PROCEDURE — 70543 MRI ORBT/FAC/NCK W/O &W/DYE: CPT | Mod: MH

## 2021-07-23 PROCEDURE — 700117 HCHG RX CONTRAST REV CODE 255: Performed by: SPECIALIST

## 2021-07-23 RX ADMIN — GADOTERIDOL 15 ML: 279.3 INJECTION, SOLUTION INTRAVENOUS at 16:40

## 2021-07-26 ENCOUNTER — HOSPITAL ENCOUNTER (OUTPATIENT)
Dept: RADIOLOGY | Facility: MEDICAL CENTER | Age: 86
End: 2021-07-26
Attending: SPECIALIST
Payer: MEDICARE

## 2021-07-26 DIAGNOSIS — C09.9 MALIGNANT NEOPLASM OF TONSIL (HCC): ICD-10-CM

## 2021-07-26 PROCEDURE — A9552 F18 FDG: HCPCS

## 2021-07-30 ENCOUNTER — HOSPITAL ENCOUNTER (OUTPATIENT)
Dept: RADIATION ONCOLOGY | Facility: MEDICAL CENTER | Age: 86
End: 2021-07-31
Attending: RADIOLOGY
Payer: MEDICARE

## 2021-07-30 ENCOUNTER — DOCUMENTATION (OUTPATIENT)
Dept: RADIATION ONCOLOGY | Facility: MEDICAL CENTER | Age: 86
End: 2021-07-30

## 2021-07-30 VITALS
BODY MASS INDEX: 24.11 KG/M2 | HEIGHT: 66 IN | TEMPERATURE: 97.3 F | WEIGHT: 150 LBS | DIASTOLIC BLOOD PRESSURE: 79 MMHG | SYSTOLIC BLOOD PRESSURE: 139 MMHG | HEART RATE: 96 BPM | OXYGEN SATURATION: 95 %

## 2021-07-30 DIAGNOSIS — C09.9 TONSIL CANCER (HCC): ICD-10-CM

## 2021-07-30 PROBLEM — J32.4 CHRONIC PANSINUSITIS: Status: ACTIVE | Noted: 2017-11-30

## 2021-07-30 PROBLEM — J34.3 HYPERTROPHY OF NASAL TURBINATES: Status: ACTIVE | Noted: 2017-11-30

## 2021-07-30 PROCEDURE — 99205 OFFICE O/P NEW HI 60 MIN: CPT | Mod: 25 | Performed by: RADIOLOGY

## 2021-07-30 PROCEDURE — 99214 OFFICE O/P EST MOD 30 MIN: CPT | Mod: 25 | Performed by: RADIOLOGY

## 2021-07-30 PROCEDURE — 31575 DIAGNOSTIC LARYNGOSCOPY: CPT | Performed by: RADIOLOGY

## 2021-07-30 RX ORDER — HYDROCHLOROTHIAZIDE 12.5 MG/1
CAPSULE, GELATIN COATED ORAL
COMMUNITY
End: 2021-07-30

## 2021-07-30 RX ORDER — ALPRAZOLAM 0.25 MG/1
0.25 TABLET ORAL NIGHTLY PRN
COMMUNITY
End: 2022-11-25

## 2021-07-30 RX ORDER — OMEGA-3 FATTY ACIDS/FISH OIL 300-1000MG
CAPSULE ORAL
COMMUNITY

## 2021-07-30 RX ORDER — CHOLECALCIFEROL (VITAMIN D3) 1250 MCG
CAPSULE ORAL
COMMUNITY
End: 2021-07-30

## 2021-07-30 NOTE — PROCEDURES
DATE OF SERVICE: 7/30/2021         FIBEROPTIC NASO-PHARYNGOSCOPY NOTE      Flexible fiberoptic exam was performed after anesthesia of left nostril and oropharynx.    Nasopharynx:       R. Eustachian canal opening, torus, fossa of Rosenmuller appear normal  L. Eustachian canal opening, torus, fossa of Rosenmuller appear normal      Oropharynx:      Postoperative ulcers involving the right and left palatine tonsil-post tonsillectomy  Base of tongue normal appearing.  Vallecula normal appearing.  Epiglottis normal appearing.  PE folds normal appearing.    Larynx:      R. AE fold, false vocal fold, arytenoid appear normal  L. AE fold, false vocal fold, arytenoid appear normal  R. Cord mobile  L. Cord mobile   R. Piriform sinus appears normal  L. Piriform sinus appears normal      Bailee CASTANEDA M.D.  Electronically signed by: Bailee CASTANEDA M.D., 7/30/2021 3:53 PM  [phone]

## 2021-07-30 NOTE — CONSULTS
RADIATION ONCOLOGY CONSULT    DATE OF SERVICE:   7/30/2021    IDENTIFICATION:   A 87 y.o. female with  Visit Diagnoses     ICD-10-CM   1. 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 on 7/30/2021  Histopathologic type: Squamous cell carcinoma, NOS  Stage prefix: Initial diagnosis      She is being seen in consultation at the kind request of Dr. Ren    HISTORY OF PRESENT ILLNESS:   87-year-old female with past medical history significant for elevated cholesterol, hypertension, peripheral neuropathy, and cataracts.  She presented to ENT in May for work-up of sinus congestion.  While undergoing work-up she was noted to have abnormal tonsils described as 2+ with a 5 mm papillomatous growth involving the right tonsil.    Removal of the growth was recommended with concern for possible predisposition to cancer.    She underwent bilateral tonsillectomy 7/15/2021.  The right tonsil was negative for malignancy.  The left tonsil demonstrated squamous cell carcinoma.    Follow-up MRI of the neck and soft tissues 7/23/2021 demonstrated a level two jugulodigastric node on the left measuring approximately 2.3 cm.  PET CT scan 7/26/2021 demonstrated bilateral tonsillar uptake considered to be postoperative.  She also had some mild uptake noted in the level two left neck node.  Uptake in the node was not mentioned in the PET/CT report.    Her primary complaint at this time is pharyngitis post tonsillectomy.  She denies otalgia and weight loss.    She has a remote smoking history total 7.5 pack years.  Stopped smoking approximately 50 years ago.    PAST MEDICAL HISTORY:   Past Medical History:   Diagnosis Date   • Arthritis     hands, right shoulder   • Cataract     bilateral IOL   • Cough     clear mucus in AM. Inhaler as needed. Under care of pulmonologist.     • Cystic kidney disease    • High cholesterol     declines taking statins   • Hypertension    • Neuropathy, peripheral    • Other specified disorder of intestines     constipation/abd pain   • Pain     left knee   • Pneumonia 2017   • PPD positive    • SCC (squamous cell carcinoma) 07/15/2021    L tonsil   • Snoring        PAST SURGICAL HISTORY:  Past Surgical History:   Procedure Laterality Date   • PB REMOVAL OF TONSILS,12+ Y/O N/A 7/15/2021    Procedure: TONSILLECTOMY.;  Surgeon: Bigg Ren M.D.;  Location: SURGERY SAME DAY [facility];  Service: Ent   • KNEE ARTHROPLASTY TOTAL Left 2/11/2019    Procedure: KNEE ARTHROPLASTY TOTAL;  Surgeon: Sherwin Burdick M.D.;  Location: SURGERY [facility];  Service: Orthopedics   • SHOULDER ARTHROSCOPY Right 2017   • CATARACT EXTRACTION WITH IOL Bilateral 2014   • COLON OVERGROWTH  1/8/2010    Performed by TRACI GARCIA at ENDOSCOPY [facility]   • KNEE ARTHROPLASTY TOTAL Right 2005   • TUBAL LIGATION  1965   • APPENDECTOMY  1957   • DILATION AND CURETTAGE N/A 1970s    x2       CURRENT MEDICATIONS:  Current Outpatient Medications   Medication Sig Dispense Refill   • Ibuprofen (ADVIL) 200 MG Cap Take  by mouth.     • ALPRAZolam (XANAX) 0.25 MG Tab Take 0.25 mg by mouth at bedtime as needed for Sleep.     • chlorhexidine (PERIDEX) 0.12 % Solution Take 15 mL by mouth as needed.     • conjugated estrogen (PREMARIN) 0.625 MG/GM Cream Insert 0.5 g in vagina 1 time daily as needed.     • fluticasone (FLONASE) 50 MCG/ACT nasal spray Spray 1-2 Sprays in nose 1 time daily as needed (for allergies).     • fexofenadine (ALLEGRA ALLERGY) 180 MG tablet Take 180 mg by mouth every morning.     • hydroCHLOROthiazide (HYDRODIURIL) 25 MG Tab Take 25 mg by mouth every morning.     • gabapentin (NEURONTIN) 300 MG CAPS Take 600 mg by mouth 2 times a day.     • Acetaminophen 325 MG Cap Take 650 mg by mouth as needed. (Patient not taking: Reported on 7/30/2021)     •  "multivitamin (THERAGRAN) Tab Take 1 tablet by mouth every morning. (Patient not taking: Reported on 7/30/2021)     • vitamin D (CHOLECALCIFEROL) 1000 UNIT Tab Take 1,000 Units by mouth every morning. (Patient not taking: Reported on 7/30/2021)       No current facility-administered medications for this encounter.     Pain Scale: 0-10  Pain Assessement: Initial  Pain Location, Orientation and Scale: Throat: Right and Left : Acute : 2  What makes the pain better: Advil  What makes the pain worse: Surgical pain from tonsillectomy.     ALLERGIES:  Shellfish allergy and Garlic    FAMILY HISTORY:    No family history of cancer.    SOCIAL HISTORY:     reports that she quit smoking about 51 years ago. She has a 7.50 pack-year smoking history. She has never used smokeless tobacco. She reports current alcohol use. She reports previous drug use. Drug: Oral.   Patient is 87 years old, she lives in [place] with one of her daughters. She is retired from nursing and administrative work.     REVIEW OF SYSTEMS:    A complete review of system taken. Pertinent items in HPI.     PHYSICAL EXAM:   PERFORMANCE STATUS:  ECOG Performance Review 7/30/2021   ECOG Performance Status Restricted in physically strenuous activity but ambulatory and able to carry out work of a light or sedentary nature, e.g., light house work, office work   Some recent data might be hidden     Karnofsky Score 7/30/2021   Karnofsky Score 80   Some recent data might be hidden     /79 (BP Location: Left arm, Patient Position: Sitting, BP Cuff Size: Adult)   Pulse 96   Temp 36.3 °C (97.3 °F) (Temporal)   Ht 1.676 m (5' 6\")   Wt 68 kg (150 lb)   SpO2 95%   BMI 24.21 kg/m²   Physical Exam  Vitals and nursing note reviewed.   Constitutional:       General: She is not in acute distress.     Appearance: She is well-developed.   HENT:      Head: Normocephalic.   Eyes:      Conjunctiva/sclera: Conjunctivae normal.      Pupils: Pupils are equal, round, and reactive to " light.   Cardiovascular:      Rate and Rhythm: Normal rate and regular rhythm.      Heart sounds: Normal heart sounds.   Pulmonary:      Effort: Pulmonary effort is normal.      Breath sounds: Normal breath sounds.   Abdominal:      General: Bowel sounds are normal.      Palpations: Abdomen is soft.   Musculoskeletal:         General: No tenderness or deformity. Normal range of motion.      Cervical back: Normal range of motion and neck supple.   Lymphadenopathy:      Cervical: No cervical adenopathy (Small area of induration left jugulodigastric region).   Skin:     General: Skin is warm and dry.      Findings: No erythema.   Neurological:      Mental Status: She is alert and oriented to person, place, and time.      Cranial Nerves: No cranial nerve deficit.      Coordination: Coordination normal.   Psychiatric:         Behavior: Behavior normal.         Thought Content: Thought content normal.         Judgment: Judgment normal.         FLEXIBLE FIBEROPTIC EXAM:  Healing ulcer right and left palatine tonsil region after recent tonsillectomy.  No visible tumor.      LABORATORY DATA:   Lab Results   Component Value Date/Time    WBC 7.0 07/01/2021 01:45 PM    RBC 5.35 07/01/2021 01:45 PM    HEMOGLOBIN 15.4 07/01/2021 01:45 PM    HEMATOCRIT 46.9 07/01/2021 01:45 PM    MCV 87.7 07/01/2021 01:45 PM    MCH 28.8 07/01/2021 01:45 PM    MCHC 32.8 (L) 07/01/2021 01:45 PM    RDW 44.2 07/01/2021 01:45 PM    PLATELETCT 256 07/01/2021 01:45 PM    MPV 11.0 07/01/2021 01:45 PM      Lab Results   Component Value Date/Time    SODIUM 132 (L) 07/01/2021 01:45 PM    POTASSIUM 3.8 07/01/2021 01:45 PM    CHLORIDE 93 (L) 07/01/2021 01:45 PM    CO2 28 07/01/2021 01:45 PM    GLUCOSE 112 (H) 07/01/2021 01:45 PM    BUN 14 07/01/2021 01:45 PM    CREATININE 0.97 07/01/2021 01:45 PM           RADIOLOGY DATA:  CT-MAXILLOFACIAL W/O PLUS RECONS    Result Date: 6/30/2021  1.  Minimal chronic sinusitis involving the ethmoid and maxillary sinuses. 2.   No evidence of acute sinusitis. 3.  Left-sided frontal sinus mass containing internal dystrophic calcifications measuring 1.8 x 1.2 x 1.2 cm. Differential diagnosis includes ossifying fibroma, atypical osteoid osteoma, or fibrous dysplasia.    MR-SOFT TISSUE NECK-WITH & W/O    Result Date: 7/23/2021  1.  Post gadolinium sequences demonstrates diffuse mucosal enhancement in the bilateral lateral and posterior pharyngeal wall, posterior tongue and uvula. The uvula is hypertrophied. This findings likely represent posttreatment/postradiation induced inflammation. There is no large mass. Please note the small mucosal lesions cannot be evaluated in this study. 2.  Enlarged left jugulodigastric lymph node consistent with metastatic lymphadenopathy.    DY-EVWWB-UKLMO BASE TO MID-THIGH    Result Date: 7/26/2021  1.  Intense uptake in the lateral pharynx on both sides, most likely postoperative healing response.  Residual tumor is difficult to entirely exclude. 2.  Focal uptake in the floor the mouth anteriorly most likely represents salivary pooling. 3.  No evidence for local lymph node involvement or distant metastatic disease. 4.  RIGHT pelvic kidney.      IMPRESSION:    A 87 y.o. with  Visit Diagnoses     ICD-10-CM   1. 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 on 7/30/2021  Histopathologic type: Squamous cell carcinoma, NOS  Stage prefix: Initial diagnosis        RECOMMENDATIONS:   Reviewed PET/CT and fiberoptic findings with patient.  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  Considering that this appears to be a small lesion well lateralized with single node less than 3 cm in size, she is an excellent candidate for definitive radiotherapy.  We will however send her to Dr. Nguyen, Medical oncology, for an  opinion.    We discussed IMRT based radiotherapy. This will involve 7000 cGy in 35 treatments delivered over 7 weeks. Treatments would be given 5 times per week M-F.     We discussed the acute radiation effects associated with therapy including but not limited to: Xerostomia, dysgeusia, radiation pharyngitis, radiation mucositis, odynophagia , dysphagia, rare risk of osteoradionecrosis.      We discussed the importance of nutrition and maintaining fluid balance during therapy. We also discussed the importance of not having any treatment breaks during therapy.  Have made a referral for oncology nutrition consult.     Measures to ameliorate radiation effects include analgesics, placement of a feeding tube either prophylactic or  on an as needed basis to help with nutrition and fluid during therapy. Weekly nutritionist visits. Speech therapy referral for swallow evaluation and ongoing swallow exercises.     We reviewed the importance of dental care prior to start of radiotherapy, during and after completion of radiotherapy. I did recommend a dental visit prior to start of radiotherapy.     After a lengthy one hour face-to-face discussion patient is willing to proceed.  She will return for simulation on 8/5/21 with treatment anticipated to start 8/17/21.     Thank you for the opportunity to participate in his care.  If any questions or comments, please do not hesitate in calling.       Orders Placed This Encounter   • REFERRAL TO ONCOLOGY PSYCHOSOCIAL SCREENING   • REFERRAL TO SPEECH THERAPY   • REFERRAL TO ONCOLOGY NUTRITION SERVICES   • REFERRAL TO ONCOLOGY NURSE NAVIGATOR   • REFERRAL TO HEMATOLOGY ONCOLOGY   • Ibuprofen (ADVIL) 200 MG Cap   • ALPRAZolam (XANAX) 0.25 MG Tab

## 2021-07-30 NOTE — CT SIMULATION
PATIENT NAME Ina Small   PRIMARY PHYSICIAN Sherwin Mejia [id number]   REFERRING PHYSICIAN Bigg Ren M.D. 10/28/1933     Tonsil cancer (HCC)  Staging form: Pharynx - HPV-Mediated Oropharynx, AJCC 8th Edition  - Clinical stage from 7/30/2021: Stage I (cT1, cN1, cM0, p16+) - Signed by Bailee CASTANEDA M.D. on 7/30/2021  Histopathologic type: Squamous cell carcinoma, NOS  Stage prefix: Initial diagnosis         Treatment Planning CT Simulation      Order Questions     Question Answer Comment    Is this for a new course of treatment? Yes     Is this an Addendum? No     Implanted Device/Pacemaker No     Simulation Status Initial     Planned Start Date 8/17/2021     Treatment Site Tonsil - [place]     Laterality Left     Treatment Technique IMRT     Treatment Pattern/Frequency Daily     Concurrent Chemotherapy No     CT Technique 3D     Slice Thickness 2mm     Scan Extent H&N     Contrast IV     IV Contrast Volume Other     Volume (cc) 100 @2.5    Treatment Device(s) S-Frame Mask      Shoulder Pulls     Patient Attire Gown     Patient Position Supine     Patient Orientation Head First     Arm Position Down     Dentures Out     Chin Position Neutral     Treatment Image Guidance CBCT     Frequency (CBCT) Daily     Image Guidance Match Bone     Treatment Planning Image Fusion CT/PET     Other Orders Weekly Physics Check             Comments     Push to [facility]. Inform physician after pushed. Will review in [facility]

## 2021-07-30 NOTE — PROGRESS NOTES
Nutrition Services:  Consult received. 87 year old female with diagnosis of tonsil cancer.  SIM scheduled for next week.  RD will attempt to visit with patient at that time.    RD monitoring.

## 2021-07-30 NOTE — NON-PROVIDER
"Patient was seen today in clinic with Dr. Byrne for consultation.  Vitals signs and weight were obtained and pain assessment was completed.  Allergies and medications were reviewed with the patient.     Vitals/Pain:  Vitals:    07/30/21 1233   BP: 139/79   BP Location: Left arm   Patient Position: Sitting   BP Cuff Size: Adult   Pulse: 96   Temp: 36.3 °C (97.3 °F)   TempSrc: Temporal   SpO2: 95%   Weight: 68 kg (150 lb)   Height: 1.676 m (5' 6\")        Allergies:   Shellfish allergy and Garlic    Current Medications:  Current Outpatient Medications   Medication Sig Dispense Refill   • Ibuprofen (ADVIL) 200 MG Cap Take  by mouth.     • ALPRAZolam (XANAX) 0.25 MG Tab Take 0.25 mg by mouth at bedtime as needed for Sleep.     • chlorhexidine (PERIDEX) 0.12 % Solution Take 15 mL by mouth as needed.     • conjugated estrogen (PREMARIN) 0.625 MG/GM Cream Insert 0.5 g in vagina 1 time daily as needed.     • fluticasone (FLONASE) 50 MCG/ACT nasal spray Spray 1-2 Sprays in nose 1 time daily as needed (for allergies).     • fexofenadine (ALLEGRA ALLERGY) 180 MG tablet Take 180 mg by mouth every morning.     • hydroCHLOROthiazide (HYDRODIURIL) 25 MG Tab Take 25 mg by mouth every morning.     • gabapentin (NEURONTIN) 300 MG CAPS Take 600 mg by mouth 2 times a day.     • Acetaminophen 325 MG Cap Take 650 mg by mouth as needed. (Patient not taking: Reported on 7/30/2021)     • multivitamin (THERAGRAN) Tab Take 1 tablet by mouth every morning. (Patient not taking: Reported on 7/30/2021)     • vitamin D (CHOLECALCIFEROL) 1000 UNIT Tab Take 1,000 Units by mouth every morning. (Patient not taking: Reported on 7/30/2021)       No current facility-administered medications for this encounter.         PCP:  Jackie Solano R.N.  "

## 2021-08-02 ENCOUNTER — HOSPITAL ENCOUNTER (OUTPATIENT)
Dept: RADIATION ONCOLOGY | Facility: MEDICAL CENTER | Age: 86
End: 2021-08-31
Attending: RADIOLOGY
Payer: MEDICARE

## 2021-08-02 ENCOUNTER — PATIENT OUTREACH (OUTPATIENT)
Dept: OTHER | Facility: MEDICAL CENTER | Age: 86
End: 2021-08-02

## 2021-08-02 NOTE — PROGRESS NOTES
Oncology nurse navigator called patient to follow up on the referral.  Oncology nurse navigator called patient to introduce self my role and our support services.  Patient reports that she and her daughter are listening to the phone call.  Patient reports that she is still recovering from her surgery and is finally getting to the point where she can eat something without a lot of pain.  She reports that she took tylenol last this morning for her discomfort.  Patient reports that she will be starting radiation and is coming in this week for simulation.  Her daughter reports that they are trying to get her ready for this treatment by working on her strengthening and exercisng.  Patient and her daughter ask about other interventions they could be performing to help prepare them for the side effects.  Oncology nurse navigator sent information Radiation and you booklet in the mail.  Patient and her daughter report that they have the transportation figured out and report no immediate barriers at this time it is more the unknown and what to expect.  Oncology nurse navigator offered contact information along as the on call navigation line.

## 2021-08-02 NOTE — PROGRESS NOTES
Oncology nurse navigator called patient to follow up on referral sent last week from Dr. Byrne's office.  Oncology nurse navigator left contact information in a voicemail with a call back request.

## 2021-08-03 ENCOUNTER — PATIENT OUTREACH (OUTPATIENT)
Dept: OTHER | Facility: MEDICAL CENTER | Age: 86
End: 2021-08-03

## 2021-08-03 NOTE — PROGRESS NOTES
On 8-3-21, Oncology Social Worker, Nori Lema, received returned call from pt. Pt reports no current barriers at this time. Pt is working on getting her dental work done and attending her family reunion, but other than that, she is doing well.  OSEI Lema thanked pt for her return call and encouraged pt to reach out if anything changes.

## 2021-08-03 NOTE — PROGRESS NOTES
On 8-3-21, Oncology Social Worker, Nori Lema, phoned pt to f/u on distress score of 7, relating to tx decisions. Pt did not answer. Left message with introduction to support team and [facility]W [facility] direct contact information.

## 2021-08-05 ENCOUNTER — HOSPITAL ENCOUNTER (OUTPATIENT)
Dept: RADIATION ONCOLOGY | Facility: MEDICAL CENTER | Age: 86
End: 2021-08-05

## 2021-08-05 ENCOUNTER — DOCUMENTATION (OUTPATIENT)
Dept: RADIATION ONCOLOGY | Facility: MEDICAL CENTER | Age: 86
End: 2021-08-05

## 2021-08-05 PROCEDURE — 77334 RADIATION TREATMENT AID(S): CPT | Mod: 26 | Performed by: RADIOLOGY

## 2021-08-05 PROCEDURE — 77290 THER RAD SIMULAJ FIELD CPLX: CPT | Performed by: RADIOLOGY

## 2021-08-05 PROCEDURE — 77263 THER RADIOLOGY TX PLNG CPLX: CPT | Performed by: RADIOLOGY

## 2021-08-05 PROCEDURE — 77334 RADIATION TREATMENT AID(S): CPT | Performed by: RADIOLOGY

## 2021-08-05 NOTE — RADIATION PLANNING NOTES
DATE OF SERVICE: 8/5/2021    DIAGNOSIS:  Tonsil cancer (HCC)  Staging form: Pharynx - HPV-Mediated Oropharynx, AJCC 8th Edition  - Clinical stage from 7/30/2021: Stage I (cT1, cN1, cM0, p16+) - Signed by Bailee CASTANEDA M.D. on 7/30/2021  Histopathologic type: Squamous cell carcinoma, NOS  Stage prefix: Initial diagnosis       DATE OF SERVICE: 8/5/2021    TYPE OF SIMULATION: Head & Neck    GOAL OF TREATMENT:   [x] Curative  [] Palliative  [] Oligometastatic    CONTRAST:    [x] IV Contrast*  [] Oral Contrast               POSITION:    [x]  Supine  [] Prone     COMPLEX:  [] Complex Blocking   [x]Arcs  [] Custom Blocks  [] >3 Sites    PROCEDURE: Patient place in supine position on CT table with head in neutral position. Dentures removed. Shoulder pulls used to stabilize shoulders. Patient head and shoulders were immobilized with a thermoplastic mask.   films evaluated to ensure proper patient position.  CT obtained through entire volume of interest. Exam pushed to treatment planning system for contouring and planning.    I have personally reviewed the relevant data, performed the target localization, and determined all relevant factors for this patient’s simulation.    *Omnipaque 80 -100cc IVP in conjunction with 500cc NS

## 2021-08-05 NOTE — PROGRESS NOTES
"Nutrition Services: RD Consultation/ New Radiation Start  Ina Small is a 87 y.o. female with diagnosis of tonsil cancer: Stage I: cT1, cN1, cM0, p16+    Past Medical History:   Diagnosis Date   • Arthritis     hands, right shoulder   • Cataract     bilateral IOL   • Cough     clear mucus in AM. Inhaler as needed. Under care of pulmonologist.    • Cystic kidney disease    • High cholesterol     declines taking statins   • Hypertension    • Neuropathy, peripheral    • Other specified disorder of intestines     constipation/abd pain   • Pain     left knee   • Pneumonia 2017   • PPD positive    • SCC (squamous cell carcinoma) 07/15/2021    L tonsil   • Snoring        Past Surgical History:   Procedure Laterality Date   • PB REMOVAL OF TONSILS,12+ Y/O N/A 7/15/2021    Procedure: TONSILLECTOMY.;  Surgeon: Bigg Ren M.D.;  Location: SURGERY SAME DAY [facility];  Service: Ent   • KNEE ARTHROPLASTY TOTAL Left 2/11/2019    Procedure: KNEE ARTHROPLASTY TOTAL;  Surgeon: Sherwin Burdick M.D.;  Location: SURGERY [facility];  Service: Orthopedics   • SHOULDER ARTHROSCOPY Right 2017   • CATARACT EXTRACTION WITH IOL Bilateral 2014   • COLON OVERGROWTH  1/8/2010    Performed by TRACI GARCIA at ENDOSCOPY [facility]   • KNEE ARTHROPLASTY TOTAL Right 2005   • TUBAL LIGATION  1965   • APPENDECTOMY  1957   • DILATION AND CURETTAGE N/A 1970s    x2       RD met with pt to assess current intake, appetite, and nutritional status.  Pt presents to appointment accompanied her daughter, Yusra. Pt states is getting back to eating more whole foods after her tonsilectomy. Mentions some weight loss as a result of this. States is feeling fine at this time. States is trying to \"fatten up\" in a healthy manner prior to treatment. Confirms is only receiving radiation treatment at this time. Mentions is drinking Ensure, sometimes original, other times is Ensure high protein. States has a sweet tooth, though has cut back on " "sugar.  is adding butter to a lot of foods, namely baked potato. Yusra mentions does not cook well, and is interested in a  or meal prep facility. Pt  is taking vitamin D though discontinued her MVI.  is eating banana bread or zucchini bread in the morning and a banana, some chicken and wild rice for lunch and pot roast and mashed potatoes for dinner.  is frequently visiting the [place] market to get fresh produce. Yusra is interested in making smoothies and using a plant-based protein powder. Pt mentions is also going to try getting started on anti-depressant.  has been in contact with ALAN Lema. Pt did not express any further nutrition-related questions or concerns at this time.     Assessment:  • Treatment Regimen: XRT  • Pertinent Labs: Na 132, glucose 112, GFR 54, No A1c   • Pertinent Meds: hydrodiruil, neurontin, premarin, flonase, xanax,   Wt Readings from Last 1 Encounters:   07/30/21 68 kg (150 lb)      Ht Readings from Last 1 Encounters:   07/30/21 1.676 m (5' 6\")      BMI Readings from Last 1 Encounters:   07/30/21 24.21 kg/m²   • BMI Classification: WNL  • Nutrition-Focused Physical Exam: Pt appears nourished for age     Weight History:  Wt Readings from Last 6 Encounters:   07/30/21 68 kg (150 lb)   07/15/21 70.9 kg (156 lb 4.9 oz)   02/11/19 69.4 kg (153 lb)   12/22/18 72.7 kg (160 lb 3.2 oz)   04/13/14 63 kg (139 lb)     Weight Change/Malnutrition Risk: Pt has experienced a 2% loss within past ~ 2 years, overall weight looks relatively stable. No presence of malnutrition at this time.    Interventions:  • Introduced self and discussed role of dietitian throughout treatment process   • Encouraged balanced diet with plant-based focus. Discussed maintaining LBM throughout treatment.   • Focus on high calorie and protein foods, fruits and vegetables with all meals/snacks - handout provided.  • Incorporate boost plus or comparable protein supplement, provided " Ensure Complete, Orgain protein powder samples, alongside coupons.   • Provided high calorie/protein add-in sheet, high calorie snack ideas, and smoothie recipes.   • RD to investigate suitable and local meal delivery services in area. Recommended [employer].     Pt reports understanding and was receptive to information provided.   RD provided contact information. Encouraged pt to reach out as questions/concerns arise.   RD following and to make further recommendations as indicated.  [phone]

## 2021-08-05 NOTE — RADIATION PLANNING NOTES
Clinical Treatment Planning Note    DATE OF SERVICE: 8/5/2021    DIAGNOSIS:  Tonsil cancer (HCC)  Staging form: Pharynx - HPV-Mediated Oropharynx, AJCC 8th Edition  - Clinical stage from 7/30/2021: Stage I (cT1, cN1, cM0, p16+) - Signed by Bailee CASTANEDA M.D. on 7/30/2021  Histopathologic type: Squamous cell carcinoma, NOS  Stage prefix: Initial diagnosis         IMAGING REVIEWED:  [] CT     [] MRI     [x] PET/CT     [] BONE SCAN     [] MAMMO     [] OTHER      TREATMENT INTENT:   [] CURATIVE     [] MAINTENANCE     []  PALLIATIVE      []  SUPPORTIVE     []  PROPHYLACTIC     [] BENIGN     []  CONSOLIDATIVE      [] DEFINITIVE   []  OLOGIMETASTATIC      LINE OF TREATMENT:  [] ADJUVANT   [x] DEFINITIVE   [] NEOADJUVANT   [] RE-TREATMENT      TECHNIQUE PLANNED:  [x] IMRT   [] 3D   [] SBRT   [] SRS/SRT   [] HDR   [] ELECTRON       IMRT JUSTIFICATION:  []   An immediately adjacent area has been previously irradiated and abutting portals must be established with high precision.    []  Dose escalation is planned to deliver radiation doses in excess of those commonly utilized for similar tumors with conventional treatment.    []  The target volume is concave or convex, and the critical normal tissues are within or around that convexity or concavity.    []  The target volume is in close proximity to critical structures that must be protected.    [x]  The volume of interest must be covered with narrow margins to adequately protect  immediately adjacent structures.      FIELDS & BLOCKING:  [] COMPLEX BLOCKS     []  = 3 TX AREAS     [x]  ARCS     []  CUSTOM SHEILD        []  SIMPLE BLOCK      CHEMOTHERAPY:  []  CONCURRENT     []  INDUCTION     [] SEQUENTIAL     []  <30 DAYS FROM XRT      NOTES:  OAR CONSTRAINTS: (GUIDELINES ONLY NOT ABSOLUTE)  Target Prescribed Coverage   PTV1 95% of PTV1 covered by 100% (cGy) of RX Dose     PTV1 99% of PTV1 covered by 93% (cGy) of RX Dose       GHULAM Goal   Any volume (1cc) outside PTV Max  Dose < 74Gy   Plan Hot Spot Max Dose < 110%   Cord  Max Dose < 45Gy   Cord + 0.5cm Max Dose < 50Gy   Brainstem Max Dose < 52 Gy   Brainstem + 0.5cm                           (0.3cm w/ Dr. Trinidad.) Max Dose < 52Gy   Oral Pharynx  Mean Dose < 45Gy    Oral Cavity Mean Dose < 30Gy   R Parotid  Mean Dose < 23Gy    L Parotid  Mean Dose < 23Gy    Cervical Esophagus Mean Dose < 30Gy   Contralateral Submandibular gland (not target)  Max Dose < 39Gy   Optic Chiasm Max Dose < 54Gy   R Optic Nerve Max Dose < 54Gy   L Optic Nerve Max Dose < 54Gy   R Eye Max Dose < 35Gy   L Eye Max Dose < 35Gy   R Lens Max Dose < 10Gy   L Lens Max Dose < 10Gy   Inner Ear Mean Dose < 50Gy    Mandible Goal - 1cc Max Dose < 70Gy   *RTOG 1016, RTOG 0225

## 2021-08-10 ENCOUNTER — DOCUMENTATION (OUTPATIENT)
Dept: RADIATION ONCOLOGY | Facility: MEDICAL CENTER | Age: 86
End: 2021-08-10

## 2021-08-10 NOTE — PROGRESS NOTES
Nutrition Services: Brief Update    RD able to send email with local and cancer related meal delivery programs as pt's daughter expressed interest in this.     RD following and will provide assistance as needed

## 2021-08-11 PROCEDURE — 77301 RADIOTHERAPY DOSE PLAN IMRT: CPT | Mod: 26 | Performed by: RADIOLOGY

## 2021-08-11 PROCEDURE — 77338 DESIGN MLC DEVICE FOR IMRT: CPT | Performed by: RADIOLOGY

## 2021-08-11 PROCEDURE — 77338 DESIGN MLC DEVICE FOR IMRT: CPT | Mod: 26 | Performed by: RADIOLOGY

## 2021-08-11 PROCEDURE — 77301 RADIOTHERAPY DOSE PLAN IMRT: CPT | Performed by: RADIOLOGY

## 2021-08-11 PROCEDURE — 77300 RADIATION THERAPY DOSE PLAN: CPT | Mod: 26 | Performed by: RADIOLOGY

## 2021-08-11 PROCEDURE — 77300 RADIATION THERAPY DOSE PLAN: CPT | Performed by: RADIOLOGY

## 2021-08-11 NOTE — ADDENDUM NOTE
Encounter addended by: Bailee CASTANEDA M.D. on: 8/11/2021 2:12 PM   Actions taken: Clinical Note Signed

## 2021-08-17 ENCOUNTER — HOSPITAL ENCOUNTER (OUTPATIENT)
Dept: RADIATION ONCOLOGY | Facility: MEDICAL CENTER | Age: 86
End: 2021-08-17

## 2021-08-17 ENCOUNTER — PATIENT OUTREACH (OUTPATIENT)
Dept: OTHER | Facility: MEDICAL CENTER | Age: 86
End: 2021-08-17

## 2021-08-17 LAB
CHEMOTHERAPY INFUSION START DATE: NORMAL
CHEMOTHERAPY RECORDS: 2
CHEMOTHERAPY RECORDS: 7000
CHEMOTHERAPY RECORDS: NORMAL
CHEMOTHERAPY RX CANCER: NORMAL
DATE 1ST CHEMO CANCER: NORMAL
RAD ONC ARIA COURSE LAST TREATMENT DATE: NORMAL
RAD ONC ARIA COURSE TREATMENT ELAPSED DAYS: NORMAL
RAD ONC ARIA REFERENCE POINT DOSAGE GIVEN TO DATE: 1.81
RAD ONC ARIA REFERENCE POINT DOSAGE GIVEN TO DATE: 2
RAD ONC ARIA REFERENCE POINT ID: NORMAL
RAD ONC ARIA REFERENCE POINT ID: NORMAL
RAD ONC ARIA REFERENCE POINT SESSION DOSAGE GIVEN: 1.81
RAD ONC ARIA REFERENCE POINT SESSION DOSAGE GIVEN: 2

## 2021-08-17 PROCEDURE — 77386 HCHG IMRT DELIVERY COMPLEX: CPT | Performed by: RADIOLOGY

## 2021-08-17 PROCEDURE — 77280 THER RAD SIMULAJ FIELD SMPL: CPT | Performed by: RADIOLOGY

## 2021-08-17 NOTE — CT SIMULATION
DATE OF SERVICE: 8/17/2021    Radiation Therapy Episodes     Active Episodes     Radiation Therapy: IMRT (8/17/2021)               Radiation Treatments       Plan Last Treated On Elapsed Days Fractions Treated Prescribed Fraction Dose (cGy) Prescribed Total Dose (cGy)    L Tonsil [HN] 08/17/2021 0 @ [id number] 1 of 35 200 7,000                Reference Point Last Treated On Elapsed Days Most Recent Session Dose (cGy) Total Dose (cGy)    HN cp 08/17/2021 0 @ [id number] 181 181    PTV70 08/17/2021 0 @ [id number] 200 200                      First Visit Simple Simulation: Called by [employer] machine to verify treatment parameters including:  treatment site, treatment dose, and treatment setup prior to first treatment. Image derived shifts reviewed in all appropriate plains.  Shifts approved.  Patient treated.    I have personally reviewed the relevant data, performed the target localization, and determined all relevant factors for this patient’s simulation.

## 2021-08-17 NOTE — PROGRESS NOTES
Pt's daughter Yusra presented to  of Radiation Department requesting to speak to Nurse Navigator.  NHUNG Saravia currently out of office.  NHUNG Stahl met with daughter in waiting area.  Yusra expresses frustration with scheduling appointments and communication.  She states that it was their understanding that today's appointment was at 4pm.  She also expresses some confusion regarding the frequency and duration of radiation treatment.  She states they have a wedding to attend on 9/8/21.  Daughter would like to schedule pt's speech therapy appointments at regular intervals with the same therapist.  She states that she was unable to arrange that through their scheduling department.  NHUNG offered to follow up.  Yusra states they received a call to schedule pt for chemotherapy.  NHUNG confirmed that pt's consultation with Dr. Jackson to discuss systemic treatment is not scheduled until 8/25/21.    [facility]oned [facility] and spoke with Supervisor Louise Nagy who confirmed no one from their office has attempted to call pt to schedule treatments.    NHUNG phoned [facility] to discuss schedule.  No answer, m requesting call back.   Concerns escalated to leadership.

## 2021-08-18 ENCOUNTER — HOSPITAL ENCOUNTER (OUTPATIENT)
Dept: RADIATION ONCOLOGY | Facility: MEDICAL CENTER | Age: 86
End: 2021-08-18
Payer: MEDICARE

## 2021-08-18 VITALS
BODY MASS INDEX: 24.69 KG/M2 | HEART RATE: 76 BPM | DIASTOLIC BLOOD PRESSURE: 77 MMHG | OXYGEN SATURATION: 93 % | SYSTOLIC BLOOD PRESSURE: 158 MMHG | TEMPERATURE: 97.7 F | WEIGHT: 153 LBS

## 2021-08-18 LAB
CHEMOTHERAPY INFUSION START DATE: NORMAL
CHEMOTHERAPY RECORDS: 2
CHEMOTHERAPY RECORDS: 7000
CHEMOTHERAPY RECORDS: NORMAL
CHEMOTHERAPY RX CANCER: NORMAL
DATE 1ST CHEMO CANCER: NORMAL
RAD ONC ARIA COURSE LAST TREATMENT DATE: NORMAL
RAD ONC ARIA COURSE TREATMENT ELAPSED DAYS: NORMAL
RAD ONC ARIA REFERENCE POINT DOSAGE GIVEN TO DATE: 3.63
RAD ONC ARIA REFERENCE POINT DOSAGE GIVEN TO DATE: 4
RAD ONC ARIA REFERENCE POINT ID: NORMAL
RAD ONC ARIA REFERENCE POINT ID: NORMAL
RAD ONC ARIA REFERENCE POINT SESSION DOSAGE GIVEN: 1.81
RAD ONC ARIA REFERENCE POINT SESSION DOSAGE GIVEN: 2

## 2021-08-18 PROCEDURE — 77014 PR CT GUIDANCE PLACEMENT RAD THERAPY FIELDS: CPT | Mod: 26 | Performed by: RADIOLOGY

## 2021-08-18 PROCEDURE — 77386 HCHG IMRT DELIVERY COMPLEX: CPT | Performed by: RADIOLOGY

## 2021-08-18 ASSESSMENT — PAIN SCALES - GENERAL: PAINLEVEL: 1=MINIMAL PAIN

## 2021-08-19 ENCOUNTER — PATIENT OUTREACH (OUTPATIENT)
Dept: OTHER | Facility: MEDICAL CENTER | Age: 86
End: 2021-08-19

## 2021-08-19 ENCOUNTER — HOSPITAL ENCOUNTER (OUTPATIENT)
Dept: RADIATION ONCOLOGY | Facility: MEDICAL CENTER | Age: 86
End: 2021-08-19
Payer: MEDICARE

## 2021-08-19 LAB
CHEMOTHERAPY INFUSION START DATE: NORMAL
CHEMOTHERAPY RECORDS: 2
CHEMOTHERAPY RECORDS: 7000
CHEMOTHERAPY RECORDS: NORMAL
CHEMOTHERAPY RX CANCER: NORMAL
DATE 1ST CHEMO CANCER: NORMAL
RAD ONC ARIA COURSE LAST TREATMENT DATE: NORMAL
RAD ONC ARIA COURSE TREATMENT ELAPSED DAYS: NORMAL
RAD ONC ARIA REFERENCE POINT DOSAGE GIVEN TO DATE: 5.44
RAD ONC ARIA REFERENCE POINT DOSAGE GIVEN TO DATE: 6
RAD ONC ARIA REFERENCE POINT ID: NORMAL
RAD ONC ARIA REFERENCE POINT ID: NORMAL
RAD ONC ARIA REFERENCE POINT SESSION DOSAGE GIVEN: 1.81
RAD ONC ARIA REFERENCE POINT SESSION DOSAGE GIVEN: 2

## 2021-08-19 PROCEDURE — 77336 RADIATION PHYSICS CONSULT: CPT | Performed by: RADIOLOGY

## 2021-08-19 PROCEDURE — 77386 HCHG IMRT DELIVERY COMPLEX: CPT | Performed by: RADIOLOGY

## 2021-08-19 PROCEDURE — 77014 PR CT GUIDANCE PLACEMENT RAD THERAPY FIELDS: CPT | Mod: 26 | Performed by: RADIOLOGY

## 2021-08-19 NOTE — PROGRESS NOTES
Met with pt after tx.  Pt states she believes they have her schedules straightened out.  She will speak with her speech therapist at her evaluation on 8/26/21 to see if they can further accommodate her in order to avoid multiple trips into town.  Pt grateful for follow up and will call ONN should she have any concerns.

## 2021-08-19 NOTE — PROGRESS NOTES
"08/25/21    Subjective    Chief Complaint: Squamous cell carcinoma of the left tonsil    HPI:  87 female had a tonsillectomy 7/15/21 revealing p16+ squamous cell carcinoma. MRI and PET suggest left neck node involvement. Dr. Byrne has staged her a T1N1M0 stage 1. She does have a history of neuropathy apparently related to cervical and lumbar spine issues.  She initiated radiation therapy 8/17/21. So far eating well and holding her weight.     ROS:    Constitutional: No weight loss  Skin: No rash or jaundice  HENT: No change in eyesight or hearing  Cardiovascular:No chest pain or arrythmia  Respiratory:No cough or SOB  GI:No nausea, vomiting, diarrhea, constipation  :No dysuria or frequency  Musculoskeletal:No bone or joint pain  Neuro:See PI  Psych: No complaints    PMH:      Allergies   Allergen Reactions   • Shellfish Allergy Vomiting     Most shellfish   • Garlic      \"Nose runs\"       Past Medical History:   Diagnosis Date   • Arthritis     hands, right shoulder   • Cataract     bilateral IOL   • Cough     clear mucus in AM. Inhaler as needed. Under care of pulmonologist.    • Cystic kidney disease    • High cholesterol     declines taking statins   • Hypertension    • Neuropathy, peripheral    • Other specified disorder of intestines     constipation/abd pain   • Pain     left knee   • Pneumonia 2017   • PPD positive    • SCC (squamous cell carcinoma) 07/15/2021    L tonsil   • Snoring         Past Surgical History:   Procedure Laterality Date   • PB REMOVAL OF TONSILS,12+ Y/O N/A 7/15/2021    Procedure: TONSILLECTOMY.;  Surgeon: Bigg Ren M.D.;  Location: SURGERY SAME DAY [facility];  Service: Ent   • KNEE ARTHROPLASTY TOTAL Left 2/11/2019    Procedure: KNEE ARTHROPLASTY TOTAL;  Surgeon: Sherwin Burdick M.D.;  [facility];  Service: Orthopedics   • SHOULDER ARTHROSCOPY Right 2017   • CATARACT EXTRACTION WITH IOL Bilateral 2014   • COLON OVERGROWTH  1/8/2010    Performed by " "TRACI GARCIA at ENDOSCOPY [facility]   • KNEE ARTHROPLASTY TOTAL Right    • TUBAL LIGATION     • APPENDECTOMY     • DILATION AND CURETTAGE N/A 1970s    x2        Medications:    Current Outpatient Medications on File Prior to Visit   Medication Sig Dispense Refill   • Ibuprofen (ADVIL) 200 MG Cap Take  by mouth.     • ALPRAZolam (XANAX) 0.25 MG Tab Take 0.25 mg by mouth at bedtime as needed for Sleep.     • HYDROcodone-acetaminophen 2.5-108 mg/5mL (HYCET) 7.5-325 MG/15ML solution TAKE 15 ML BY MOUTH EVERY 6 HOURS AS NEEDED FOR PAIN FOR 7 DAYS     • chlorhexidine (PERIDEX) 0.12 % Solution Take 15 mL by mouth as needed.     • fluticasone (FLONASE) 50 MCG/ACT nasal spray Spray 1-2 Sprays in nose 1 time daily as needed (for allergies).     • fexofenadine (ALLEGRA ALLERGY) 180 MG tablet Take 180 mg by mouth every morning.     • hydroCHLOROthiazide (HYDRODIURIL) 25 MG Tab Take 25 mg by mouth every morning.     • gabapentin (NEURONTIN) 300 MG CAPS Take 600 mg by mouth 2 times a day.     • conjugated estrogen (PREMARIN) 0.625 MG/GM Cream Insert 0.5 g in vagina 1 time daily as needed. (Patient not taking: Reported on 2021)       No current facility-administered medications on file prior to visit.       Social History     Tobacco Use   • Smoking status: Former Smoker     Packs/day: 0.50     Years: 15.00     Pack years: 7.50     Quit date: 1970     Years since quittin.6   • Smokeless tobacco: Never Used   • Tobacco comment: quit    Substance Use Topics   • Alcohol use: Yes     Comment: 1-2 per week         History reviewed. No pertinent family history.     Objective    Vitals:    /82 (BP Location: Right arm, Patient Position: Sitting, BP Cuff Size: Adult)   Pulse 94   Temp 36.7 °C (98 °F) (Temporal)   Resp 16   Ht 1.676 m (5' 5.98\")   Wt 69.4 kg (153 lb)   SpO2 94%   BMI 24.71 kg/m²     Physical Exam:    Appears well-developed, well-nourished, appears much younger than her " stated age. No distress.    Head -  Normocephalic .   Eyes - Pupils are equal.. Conjunctivae normal. No scleral icterus.   Ears - normal hearing  Mouth - s/p tonsillectomy. No mass or erythema. Moist.   Neck - Normal range of motion. Neck supple. No thyromegaly  Cardiovascular - Normal rate, regular rhythm, normal heart sounds and intact distal pulses. No  gallop, murmur or rub  Pulmonary - Normal breath sounds.  No wheeze, rales or rhonci  Breast - Not examined  Abdominal -Soft. No distension, tenderness, organomegaly or mass  Extremities-  No edema or tenderness.    Nodes - No submental, submandibular, preauricular, cervical, axillary or inguinal adenopathy.    Neurological -   Alert and oriented.  Skin - Skin is warm and dry. No rash noted. Not diaphoretic. No erythema. No pallor. No jaundice   Psychiatric -  Normal mood and affect.    Labs:    Results for BRENDA RINCON (MRN [id number])    Ref. Range 7/1/2021 13:45   WBC Latest Ref Range: 4.8 - 10.8 K/uL 7.0   RBC Latest Ref Range: 4.20 - 5.40 M/uL 5.35   Hemoglobin Latest Ref Range: 12.0 - 16.0 g/dL 15.4   Hematocrit Latest Ref Range: 37.0 - 47.0 % 46.9   MCV Latest Ref Range: 81.4 - 97.8 fL 87.7   MCH Latest Ref Range: 27.0 - 33.0 pg 28.8   MCHC Latest Ref Range: 33.6 - 35.0 g/dL 32.8 (L)   RDW Latest Ref Range: 35.9 - 50.0 fL 44.2   Platelet Count Latest Ref Range: 164 - 446 K/uL 256   Results for BRENDA RINCON (MRN [id number])    Ref. Range 7/1/2021 13:45   Sodium Latest Ref Range: 135 - 145 mmol/L 132 (L)   Potassium Latest Ref Range: 3.6 - 5.5 mmol/L 3.8   Chloride Latest Ref Range: 96 - 112 mmol/L 93 (L)   Co2 Latest Ref Range: 20 - 33 mmol/L 28   Anion Gap Latest Ref Range: 7.0 - 16.0  11.0   Glucose Latest Ref Range: 65 - 99 mg/dL 112 (H)   Bun Latest Ref Range: 8 - 22 mg/dL 14   Creatinine Latest Ref Range: 0.50 - 1.40 mg/dL 0.97   GFR If  Latest Ref Range: >60 mL/min/1.73 m 2 >60   GFR If Non  Latest Ref Range: >60  mL/min/1.73 m 2 54 (A)   Calcium Latest Ref Range: 8.5 - 10.5 mg/dL 9.6     Assessment    Imp:    Visit Diagnosis:    1. Tonsil cancer (HCC)           Plan:  Given othat tonsil cancer is highly radiosensitive, given her age and already present neuropathy, I think discretion the better part of valor and did not recommend chemotherapy. Discussed at length with patient and daughter who are in agreement.   RTC prn    Patrick Jackson M.D.

## 2021-08-20 ENCOUNTER — HOSPITAL ENCOUNTER (OUTPATIENT)
Dept: RADIATION ONCOLOGY | Facility: MEDICAL CENTER | Age: 86
End: 2021-08-20
Payer: MEDICARE

## 2021-08-20 LAB
CHEMOTHERAPY INFUSION START DATE: NORMAL
CHEMOTHERAPY RECORDS: 2
CHEMOTHERAPY RECORDS: 7000
CHEMOTHERAPY RECORDS: NORMAL
CHEMOTHERAPY RX CANCER: NORMAL
DATE 1ST CHEMO CANCER: NORMAL
RAD ONC ARIA COURSE LAST TREATMENT DATE: NORMAL
RAD ONC ARIA COURSE TREATMENT ELAPSED DAYS: NORMAL
RAD ONC ARIA REFERENCE POINT DOSAGE GIVEN TO DATE: 7.26
RAD ONC ARIA REFERENCE POINT DOSAGE GIVEN TO DATE: 8
RAD ONC ARIA REFERENCE POINT ID: NORMAL
RAD ONC ARIA REFERENCE POINT ID: NORMAL
RAD ONC ARIA REFERENCE POINT SESSION DOSAGE GIVEN: 1.81
RAD ONC ARIA REFERENCE POINT SESSION DOSAGE GIVEN: 2

## 2021-08-20 PROCEDURE — 77386 HCHG IMRT DELIVERY COMPLEX: CPT | Performed by: RADIOLOGY

## 2021-08-20 PROCEDURE — 77014 PR CT GUIDANCE PLACEMENT RAD THERAPY FIELDS: CPT | Mod: 26 | Performed by: RADIOLOGY

## 2021-08-23 ENCOUNTER — HOSPITAL ENCOUNTER (OUTPATIENT)
Dept: RADIATION ONCOLOGY | Facility: MEDICAL CENTER | Age: 86
End: 2021-08-23
Payer: MEDICARE

## 2021-08-23 LAB
CHEMOTHERAPY INFUSION START DATE: NORMAL
CHEMOTHERAPY RECORDS: 2
CHEMOTHERAPY RECORDS: 7000
CHEMOTHERAPY RECORDS: NORMAL
CHEMOTHERAPY RX CANCER: NORMAL
DATE 1ST CHEMO CANCER: NORMAL
RAD ONC ARIA COURSE LAST TREATMENT DATE: NORMAL
RAD ONC ARIA COURSE TREATMENT ELAPSED DAYS: NORMAL
RAD ONC ARIA REFERENCE POINT DOSAGE GIVEN TO DATE: 10
RAD ONC ARIA REFERENCE POINT DOSAGE GIVEN TO DATE: 9.07
RAD ONC ARIA REFERENCE POINT ID: NORMAL
RAD ONC ARIA REFERENCE POINT ID: NORMAL
RAD ONC ARIA REFERENCE POINT SESSION DOSAGE GIVEN: 1.81
RAD ONC ARIA REFERENCE POINT SESSION DOSAGE GIVEN: 2

## 2021-08-23 PROCEDURE — 77386 HCHG IMRT DELIVERY COMPLEX: CPT | Performed by: RADIOLOGY

## 2021-08-23 PROCEDURE — 77014 PR CT GUIDANCE PLACEMENT RAD THERAPY FIELDS: CPT | Mod: 26 | Performed by: RADIOLOGY

## 2021-08-23 PROCEDURE — 77427 RADIATION TX MANAGEMENT X5: CPT | Performed by: RADIOLOGY

## 2021-08-24 ENCOUNTER — HOSPITAL ENCOUNTER (OUTPATIENT)
Dept: RADIATION ONCOLOGY | Facility: MEDICAL CENTER | Age: 86
End: 2021-08-24
Payer: MEDICARE

## 2021-08-24 ENCOUNTER — DOCUMENTATION (OUTPATIENT)
Dept: RADIATION ONCOLOGY | Facility: MEDICAL CENTER | Age: 86
End: 2021-08-24

## 2021-08-24 LAB
CHEMOTHERAPY INFUSION START DATE: NORMAL
CHEMOTHERAPY RECORDS: 2
CHEMOTHERAPY RECORDS: 7000
CHEMOTHERAPY RECORDS: NORMAL
CHEMOTHERAPY RX CANCER: NORMAL
DATE 1ST CHEMO CANCER: NORMAL
RAD ONC ARIA COURSE LAST TREATMENT DATE: NORMAL
RAD ONC ARIA COURSE TREATMENT ELAPSED DAYS: NORMAL
RAD ONC ARIA REFERENCE POINT DOSAGE GIVEN TO DATE: 10.89
RAD ONC ARIA REFERENCE POINT DOSAGE GIVEN TO DATE: 12
RAD ONC ARIA REFERENCE POINT ID: NORMAL
RAD ONC ARIA REFERENCE POINT ID: NORMAL
RAD ONC ARIA REFERENCE POINT SESSION DOSAGE GIVEN: 1.81
RAD ONC ARIA REFERENCE POINT SESSION DOSAGE GIVEN: 2

## 2021-08-24 PROCEDURE — 77386 HCHG IMRT DELIVERY COMPLEX: CPT | Performed by: RADIOLOGY

## 2021-08-24 PROCEDURE — 77014 PR CT GUIDANCE PLACEMENT RAD THERAPY FIELDS: CPT | Mod: 26 | Performed by: RADIOLOGY

## 2021-08-24 NOTE — PROGRESS NOTES
Nutrition Services: Brief Update  Wt Readings from Last 7 Encounters:   08/18/21 69.4 kg (153 lb)   07/30/21 68 kg (150 lb)   07/15/21 70.9 kg (156 lb 4.9 oz)   02/11/19 69.4 kg (153 lb)   12/22/18 72.7 kg (160 lb 3.2 oz)   04/13/14 63 kg (139 lb)     Weight Change: wt fluctuating, though appears stable at this time    RD able to visit pt following OTV. Pt  is managing well at this time, though does not have strong appetite.  is still eating despite this and stomach only hurts if is eating too much.  has maintained weight for now.  throat pain is 1/10 and does not seem to be impacting intake at this time.  is drinking more tea, coffee, and water throughout day to meet hydration needs.     Dietary recall:  -Banana with small portion of blueberries/ raspberries, tea to drink and some zucchini bread with butter  -Ensure or homemade smoothie with Greek Yogurt  -rice pudding or ice cream  -Pot roast or meat loaf with veggie salad and avocado     Plan/Recommend:  • RD encouraged use of creams, oils, and butter to help meet calorie needs and continue with weight maintenance during treatment  • RD to continue to follow and provide interventions as indicated     Pt verbalizes understanding and is receptive to information provided. RD to continue to monitor throughout treatment and provide nutrition recommendations as indicated.  Please contact [phone]

## 2021-08-25 ENCOUNTER — OFFICE VISIT (OUTPATIENT)
Dept: HEMATOLOGY ONCOLOGY | Facility: MEDICAL CENTER | Age: 86
End: 2021-08-25
Payer: MEDICARE

## 2021-08-25 ENCOUNTER — HOSPITAL ENCOUNTER (OUTPATIENT)
Dept: RADIATION ONCOLOGY | Facility: MEDICAL CENTER | Age: 86
End: 2021-08-25
Payer: MEDICARE

## 2021-08-25 VITALS
OXYGEN SATURATION: 94 % | DIASTOLIC BLOOD PRESSURE: 82 MMHG | HEART RATE: 94 BPM | HEIGHT: 66 IN | BODY MASS INDEX: 24.59 KG/M2 | RESPIRATION RATE: 16 BRPM | WEIGHT: 153 LBS | SYSTOLIC BLOOD PRESSURE: 130 MMHG | TEMPERATURE: 98 F

## 2021-08-25 VITALS
BODY MASS INDEX: 24.72 KG/M2 | OXYGEN SATURATION: 95 % | HEART RATE: 83 BPM | TEMPERATURE: 97.2 F | SYSTOLIC BLOOD PRESSURE: 144 MMHG | WEIGHT: 153.11 LBS | DIASTOLIC BLOOD PRESSURE: 77 MMHG

## 2021-08-25 DIAGNOSIS — C09.9 TONSIL CANCER (HCC): ICD-10-CM

## 2021-08-25 LAB
CHEMOTHERAPY INFUSION START DATE: NORMAL
CHEMOTHERAPY RECORDS: 2
CHEMOTHERAPY RECORDS: 7000
CHEMOTHERAPY RECORDS: NORMAL
CHEMOTHERAPY RX CANCER: NORMAL
DATE 1ST CHEMO CANCER: NORMAL
RAD ONC ARIA COURSE LAST TREATMENT DATE: NORMAL
RAD ONC ARIA COURSE TREATMENT ELAPSED DAYS: NORMAL
RAD ONC ARIA REFERENCE POINT DOSAGE GIVEN TO DATE: 12.7
RAD ONC ARIA REFERENCE POINT DOSAGE GIVEN TO DATE: 14
RAD ONC ARIA REFERENCE POINT ID: NORMAL
RAD ONC ARIA REFERENCE POINT ID: NORMAL
RAD ONC ARIA REFERENCE POINT SESSION DOSAGE GIVEN: 1.81
RAD ONC ARIA REFERENCE POINT SESSION DOSAGE GIVEN: 2

## 2021-08-25 PROCEDURE — 77014 PR CT GUIDANCE PLACEMENT RAD THERAPY FIELDS: CPT | Mod: 26 | Performed by: RADIOLOGY

## 2021-08-25 PROCEDURE — 77386 HCHG IMRT DELIVERY COMPLEX: CPT | Performed by: RADIOLOGY

## 2021-08-25 PROCEDURE — 99204 OFFICE O/P NEW MOD 45 MIN: CPT | Performed by: INTERNAL MEDICINE

## 2021-08-25 ASSESSMENT — PATIENT HEALTH QUESTIONNAIRE - PHQ9: CLINICAL INTERPRETATION OF PHQ2 SCORE: 0

## 2021-08-25 ASSESSMENT — PAIN SCALES - GENERAL
PAINLEVEL: 1=MINIMAL PAIN
PAINLEVEL: NO PAIN

## 2021-08-26 ENCOUNTER — HOSPITAL ENCOUNTER (OUTPATIENT)
Dept: RADIATION ONCOLOGY | Facility: MEDICAL CENTER | Age: 86
End: 2021-08-26

## 2021-08-26 ENCOUNTER — SPEECH THERAPY (OUTPATIENT)
Dept: SPEECH THERAPY | Facility: REHABILITATION | Age: 86
End: 2021-08-26
Attending: RADIOLOGY
Payer: MEDICARE

## 2021-08-26 DIAGNOSIS — C09.9 TONSIL CANCER (HCC): ICD-10-CM

## 2021-08-26 DIAGNOSIS — R13.10 DYSPHAGIA, UNSPECIFIED TYPE: ICD-10-CM

## 2021-08-26 LAB
CHEMOTHERAPY INFUSION START DATE: NORMAL
CHEMOTHERAPY RECORDS: 2
CHEMOTHERAPY RECORDS: 7000
CHEMOTHERAPY RECORDS: NORMAL
CHEMOTHERAPY RX CANCER: NORMAL
DATE 1ST CHEMO CANCER: NORMAL
RAD ONC ARIA COURSE LAST TREATMENT DATE: NORMAL
RAD ONC ARIA COURSE TREATMENT ELAPSED DAYS: NORMAL
RAD ONC ARIA REFERENCE POINT DOSAGE GIVEN TO DATE: 14.52
RAD ONC ARIA REFERENCE POINT DOSAGE GIVEN TO DATE: 16
RAD ONC ARIA REFERENCE POINT ID: NORMAL
RAD ONC ARIA REFERENCE POINT ID: NORMAL
RAD ONC ARIA REFERENCE POINT SESSION DOSAGE GIVEN: 1.81
RAD ONC ARIA REFERENCE POINT SESSION DOSAGE GIVEN: 2

## 2021-08-26 PROCEDURE — 77014 PR CT GUIDANCE PLACEMENT RAD THERAPY FIELDS: CPT | Mod: 26 | Performed by: RADIOLOGY

## 2021-08-26 PROCEDURE — 92610 EVALUATE SWALLOWING FUNCTION: CPT

## 2021-08-26 PROCEDURE — 77336 RADIATION PHYSICS CONSULT: CPT | Performed by: RADIOLOGY

## 2021-08-26 PROCEDURE — 77386 HCHG IMRT DELIVERY COMPLEX: CPT | Performed by: RADIOLOGY

## 2021-08-26 ASSESSMENT — ENCOUNTER SYMPTOMS: PAIN SCALE: 1

## 2021-08-26 ASSESSMENT — SOCIAL DETERMINANTS OF HEALTH (SDOH): SOCIAL_SUPPORT_SYSTEM: OTHER

## 2021-08-27 ENCOUNTER — HOSPITAL ENCOUNTER (OUTPATIENT)
Dept: RADIATION ONCOLOGY | Facility: MEDICAL CENTER | Age: 86
End: 2021-08-27
Payer: MEDICARE

## 2021-08-27 LAB
CHEMOTHERAPY INFUSION START DATE: NORMAL
CHEMOTHERAPY RECORDS: 2
CHEMOTHERAPY RECORDS: 7000
CHEMOTHERAPY RECORDS: NORMAL
CHEMOTHERAPY RX CANCER: NORMAL
DATE 1ST CHEMO CANCER: NORMAL
RAD ONC ARIA COURSE LAST TREATMENT DATE: NORMAL
RAD ONC ARIA COURSE TREATMENT ELAPSED DAYS: NORMAL
RAD ONC ARIA REFERENCE POINT DOSAGE GIVEN TO DATE: 16.33
RAD ONC ARIA REFERENCE POINT DOSAGE GIVEN TO DATE: 18
RAD ONC ARIA REFERENCE POINT ID: NORMAL
RAD ONC ARIA REFERENCE POINT ID: NORMAL
RAD ONC ARIA REFERENCE POINT SESSION DOSAGE GIVEN: 1.81
RAD ONC ARIA REFERENCE POINT SESSION DOSAGE GIVEN: 2

## 2021-08-27 PROCEDURE — 77014 PR CT GUIDANCE PLACEMENT RAD THERAPY FIELDS: CPT | Mod: 26 | Performed by: RADIOLOGY

## 2021-08-27 PROCEDURE — 77386 HCHG IMRT DELIVERY COMPLEX: CPT | Performed by: RADIOLOGY

## 2021-08-30 ENCOUNTER — HOSPITAL ENCOUNTER (OUTPATIENT)
Dept: RADIATION ONCOLOGY | Facility: MEDICAL CENTER | Age: 86
End: 2021-08-30
Payer: MEDICARE

## 2021-08-30 LAB
CHEMOTHERAPY INFUSION START DATE: NORMAL
CHEMOTHERAPY RECORDS: 2
CHEMOTHERAPY RECORDS: 7000
CHEMOTHERAPY RECORDS: NORMAL
CHEMOTHERAPY RX CANCER: NORMAL
DATE 1ST CHEMO CANCER: NORMAL
RAD ONC ARIA COURSE LAST TREATMENT DATE: NORMAL
RAD ONC ARIA COURSE TREATMENT ELAPSED DAYS: NORMAL
RAD ONC ARIA REFERENCE POINT DOSAGE GIVEN TO DATE: 18.14
RAD ONC ARIA REFERENCE POINT DOSAGE GIVEN TO DATE: 20
RAD ONC ARIA REFERENCE POINT ID: NORMAL
RAD ONC ARIA REFERENCE POINT ID: NORMAL
RAD ONC ARIA REFERENCE POINT SESSION DOSAGE GIVEN: 1.81
RAD ONC ARIA REFERENCE POINT SESSION DOSAGE GIVEN: 2

## 2021-08-30 PROCEDURE — 77386 HCHG IMRT DELIVERY COMPLEX: CPT | Performed by: RADIOLOGY

## 2021-08-30 PROCEDURE — 77014 PR CT GUIDANCE PLACEMENT RAD THERAPY FIELDS: CPT | Mod: 26 | Performed by: RADIOLOGY

## 2021-08-30 PROCEDURE — 77427 RADIATION TX MANAGEMENT X5: CPT | Performed by: RADIOLOGY

## 2021-08-31 ENCOUNTER — HOSPITAL ENCOUNTER (OUTPATIENT)
Dept: RADIATION ONCOLOGY | Facility: MEDICAL CENTER | Age: 86
End: 2021-08-31

## 2021-08-31 LAB
CHEMOTHERAPY INFUSION START DATE: NORMAL
CHEMOTHERAPY RECORDS: 2
CHEMOTHERAPY RECORDS: 7000
CHEMOTHERAPY RECORDS: NORMAL
CHEMOTHERAPY RX CANCER: NORMAL
DATE 1ST CHEMO CANCER: NORMAL
RAD ONC ARIA COURSE LAST TREATMENT DATE: NORMAL
RAD ONC ARIA COURSE TREATMENT ELAPSED DAYS: NORMAL
RAD ONC ARIA REFERENCE POINT DOSAGE GIVEN TO DATE: 19.96
RAD ONC ARIA REFERENCE POINT DOSAGE GIVEN TO DATE: 22
RAD ONC ARIA REFERENCE POINT ID: NORMAL
RAD ONC ARIA REFERENCE POINT ID: NORMAL
RAD ONC ARIA REFERENCE POINT SESSION DOSAGE GIVEN: 1.81
RAD ONC ARIA REFERENCE POINT SESSION DOSAGE GIVEN: 2

## 2021-08-31 PROCEDURE — 77014 PR CT GUIDANCE PLACEMENT RAD THERAPY FIELDS: CPT | Mod: 26 | Performed by: RADIOLOGY

## 2021-08-31 PROCEDURE — 77386 HCHG IMRT DELIVERY COMPLEX: CPT | Performed by: RADIOLOGY

## 2021-09-01 ENCOUNTER — HOSPITAL ENCOUNTER (OUTPATIENT)
Dept: RADIATION ONCOLOGY | Facility: MEDICAL CENTER | Age: 86
End: 2021-09-01

## 2021-09-01 ENCOUNTER — HOSPITAL ENCOUNTER (OUTPATIENT)
Dept: RADIATION ONCOLOGY | Facility: MEDICAL CENTER | Age: 86
End: 2021-09-30
Attending: RADIOLOGY
Payer: MEDICARE

## 2021-09-01 VITALS
OXYGEN SATURATION: 90 % | WEIGHT: 154.21 LBS | DIASTOLIC BLOOD PRESSURE: 81 MMHG | BODY MASS INDEX: 24.9 KG/M2 | HEART RATE: 67 BPM | SYSTOLIC BLOOD PRESSURE: 156 MMHG

## 2021-09-01 LAB
CHEMOTHERAPY INFUSION START DATE: NORMAL
CHEMOTHERAPY RECORDS: 2
CHEMOTHERAPY RECORDS: 7000
CHEMOTHERAPY RECORDS: NORMAL
CHEMOTHERAPY RX CANCER: NORMAL
DATE 1ST CHEMO CANCER: NORMAL
RAD ONC ARIA COURSE LAST TREATMENT DATE: NORMAL
RAD ONC ARIA COURSE TREATMENT ELAPSED DAYS: NORMAL
RAD ONC ARIA REFERENCE POINT DOSAGE GIVEN TO DATE: 21.77
RAD ONC ARIA REFERENCE POINT DOSAGE GIVEN TO DATE: 24
RAD ONC ARIA REFERENCE POINT ID: NORMAL
RAD ONC ARIA REFERENCE POINT ID: NORMAL
RAD ONC ARIA REFERENCE POINT SESSION DOSAGE GIVEN: 1.81
RAD ONC ARIA REFERENCE POINT SESSION DOSAGE GIVEN: 2

## 2021-09-01 PROCEDURE — 77386 HCHG IMRT DELIVERY COMPLEX: CPT | Performed by: RADIOLOGY

## 2021-09-01 PROCEDURE — 77014 PR CT GUIDANCE PLACEMENT RAD THERAPY FIELDS: CPT | Mod: 26 | Performed by: RADIOLOGY

## 2021-09-01 ASSESSMENT — PAIN SCALES - GENERAL: PAINLEVEL: 1=MINIMAL PAIN

## 2021-09-01 NOTE — ON TREATMENT VISIT
"  ON TREATMENT  NOTE  RADIATION ONCOLOGY DEPARTMENT    Patient name:  Ina Small    Primary Physician:  HENRRY Earl MRN: [id number]  CSN: [id number]   Referring physician:  Bigg Ren M.D. : 10/28/1933, 87 y.o.     ENCOUNTER DATE:  2021      DIAGNOSIS:  Tonsil cancer (HCC)  Staging form: Pharynx - HPV-Mediated Oropharynx, AJCC 8th Edition  - Clinical stage from 2021: Stage I (cT1, cN1, cM0, p16+) - Signed by Bailee CASTANEDA M.D. on 2021  Histopathologic type: Squamous cell carcinoma, NOS  Stage prefix: Initial diagnosis      TREATMENT SUMMARY:  [facility] Treatment Information        Some values may be hidden. Unless noted otherwise, only the newest values recorded on each date are displayed.         [facility] Treatment Summary 21   Course First Treatment Date 2021   Course Last Treatment Date 2021   L Tonsil [HN] Plan from Course [id number]   Fraction 12 of 35   Elapsed Course Days 15 @ [id number]   Prescribed Fraction Dose 200 cGy   Prescribed Total Dose 7,000 cGy   HN cp Reference Point from Course [id number]   Elapsed Course Days 15 @ [id number]   Session Dose 181 cGy   Total Dose 2,177 cGy   PTV70 Reference Point from Course [id number]   Elapsed Course Days 15 @ [id number]   Session Dose 200 cGy   Total Dose 2,400 cGy                SUBJECTIVE:  Mild sorethroat. No significant xerostomia. Taste mild alteration      VITAL SIGNS:  Vitals 2021 2021 2021 2021 2021 7/15/2021 2019   SYSTOLIC 156 144 130 158 139 148 124   DIASTOLIC 81 77 82 77 79 88 56   PULSE 67 83 94 76 96 60 64   TEMPERATURE - 97.2 98 97.7 97.3 98.2 97.9   RESPIRATIONS - - 16 - - 16 18   WEIGHT 154.21 153.11 153 153 150 156.31 -   HEIGHT - - 5' 5.984\" - 5' 6\" 5' 6\" -   BMI 24.9 kg/m2 24.72 kg/m2 24.71 kg/m2 24.69 kg/m2 24.21 kg/m2 25.23 kg/m2 -   SPO2 90 95 94 93 95 91 98     KPS: 70, Cares for self; unable to carry on normal activity or to do active " work (ECOG equivalent 1)  Encounter Vitals  Blood Pressure : 156/81  Pulse: 67  Pulse Oximetry: 90 %  Weight: 70 kg (154 lb 3.4 oz)  Pain Score: 1=Minimal Pain  Pain Assessment 9/1/2021 8/25/2021 8/25/2021 8/18/2021   Pain Score 1 0 1 1   Pain Loc Throat - Throat Throat   Some recent data might be hidden          PHYSICAL EXAM:  Physical Exam  Vitals and nursing note reviewed.   Constitutional:       Appearance: She is well-developed.   HENT:      Head: Normocephalic.      Mouth/Throat:      Mouth: Mucous membranes are moist.      Pharynx: Oropharynx is clear. No oropharyngeal exudate or posterior oropharyngeal erythema.   Skin:     General: Skin is warm and dry.      Findings: No erythema.   Neurological:      Mental Status: She is alert and oriented to person, place, and time.   Psychiatric:         Behavior: Behavior normal.         Thought Content: Thought content normal.         Judgment: Judgment normal.          Toxicity Assessment 9/1/2021 8/25/2021 8/18/2021   Toxicity Assessment Head/Neck Head/Neck Head/Neck   Fatigue (lethargy, malaise, asthenia) Increased fatigue over baseline, but not altering normal activities None None   Radiation Dermatitis None None None   Rash/desquamation None None None   Constipation None None None   Dehydration None None None   Mouth Dryness Normal Normal Normal   RT Dysphagia-Pharyngeal Mild dysphagia, but can eat regular diet Mild dysphagia, but can eat regular diet None   Mucositis None Erythema of mucosa None   Salivary Gland Changes Sticky thickened saliva, may have slightly altered taste (e.g. metallic), additional fluids may be required Sticky thickened saliva, may have slightly altered taste (e.g. metallic), additional fluids may be required None   Stomatitis/Pharyngitis - - None   Taste Disturbance (dysgeusia) Slightly altered Slightly altered Normal   RT - Pain due to RT Mild pain not interfering with function None None   Cough Absent Absent Absent   Voice  changes/stridor/larynx (hoarseness, loss of voice, laryngitis) Mild or intermittent hoarseness Normal Normal       CURRENT MEDICATIONS:    Current Outpatient Medications:   •  Ibuprofen (ADVIL) 200 MG Cap, Take  by mouth., Disp: , Rfl:   •  ALPRAZolam (XANAX) 0.25 MG Tab, Take 0.25 mg by mouth at bedtime as needed for Sleep., Disp: , Rfl:   •  HYDROcodone-acetaminophen 2.5-108 mg/5mL (HYCET) 7.5-325 MG/15ML solution, TAKE 15 ML BY MOUTH EVERY 6 HOURS AS NEEDED FOR PAIN FOR 7 DAYS, Disp: , Rfl:   •  chlorhexidine (PERIDEX) 0.12 % Solution, Take 15 mL by mouth as needed., Disp: , Rfl:   •  conjugated estrogen (PREMARIN) 0.625 MG/GM Cream, Insert 0.5 g in vagina 1 time daily as needed. (Patient not taking: Reported on 8/25/2021), Disp: , Rfl:   •  fluticasone (FLONASE) 50 MCG/ACT nasal spray, Spray 1-2 Sprays in nose 1 time daily as needed (for allergies)., Disp: , Rfl:   •  fexofenadine (ALLEGRA ALLERGY) 180 MG tablet, Take 180 mg by mouth every morning., Disp: , Rfl:   •  hydroCHLOROthiazide (HYDRODIURIL) 25 MG Tab, Take 25 mg by mouth every morning., Disp: , Rfl:   •  gabapentin (NEURONTIN) 300 MG CAPS, Take 600 mg by mouth 2 times a day., Disp: , Rfl:     LABORATORY DATA:   Lab Results   Component Value Date/Time    SODIUM 132 (L) 07/01/2021 01:45 PM    POTASSIUM 3.8 07/01/2021 01:45 PM    CHLORIDE 93 (L) 07/01/2021 01:45 PM    CO2 28 07/01/2021 01:45 PM    GLUCOSE 112 (H) 07/01/2021 01:45 PM    BUN 14 07/01/2021 01:45 PM    CREATININE 0.97 07/01/2021 01:45 PM       Lab Results   Component Value Date/Time    WBC 7.0 07/01/2021 01:45 PM    RBC 5.35 07/01/2021 01:45 PM    HEMOGLOBIN 15.4 07/01/2021 01:45 PM    HEMATOCRIT 46.9 07/01/2021 01:45 PM    MCV 87.7 07/01/2021 01:45 PM    MCH 28.8 07/01/2021 01:45 PM    MCHC 32.8 (L) 07/01/2021 01:45 PM    PLATELETCT 256 07/01/2021 01:45 PM         RADIOLOGY DATA:  MR-SOFT TISSUE NECK-WITH & W/O    Result Date: 7/23/2021  1.  Post gadolinium sequences demonstrates diffuse  mucosal enhancement in the bilateral lateral and posterior pharyngeal wall, posterior tongue and uvula. The uvula is hypertrophied. This findings likely represent posttreatment/postradiation induced inflammation. There is no large mass. Please note the small mucosal lesions cannot be evaluated in this study. 2.  Enlarged left jugulodigastric lymph node consistent with metastatic lymphadenopathy.    [id number] BASE TO MID-THIGH    Result Date: 7/26/2021  1.  Intense uptake in the lateral pharynx on both sides, most likely postoperative healing response.  Residual tumor is difficult to entirely exclude. 2.  Focal uptake in the floor the mouth anteriorly most likely represents salivary pooling. 3.  No evidence for local lymph node involvement or distant metastatic disease. 4.  RIGHT pelvic kidney.      IMPRESSION:  Cancer Staging  Tonsil cancer (HCC)  Staging form: Pharynx - HPV-Mediated Oropharynx, AJCC 8th Edition  - Clinical stage from 7/30/2021: Stage I (cT1, cN1, cM0, p16+) - Signed by Bailee CASTANEDA M.D. on 7/30/2021      PLAN:  No change in treatment plan    Disposition:  Treatment plan and imaging reviewed. Questions answered. Continue therapy outlined.     Bailee CASTANEDA M.D.    No orders of the defined types were placed in this encounter.

## 2021-09-02 ENCOUNTER — HOSPITAL ENCOUNTER (OUTPATIENT)
Dept: RADIATION ONCOLOGY | Facility: MEDICAL CENTER | Age: 86
End: 2021-09-02
Payer: MEDICARE

## 2021-09-02 ENCOUNTER — APPOINTMENT (OUTPATIENT)
Dept: SPEECH THERAPY | Facility: REHABILITATION | Age: 86
End: 2021-09-02
Attending: RADIOLOGY
Payer: MEDICARE

## 2021-09-02 LAB
CHEMOTHERAPY INFUSION START DATE: NORMAL
CHEMOTHERAPY RECORDS: 2
CHEMOTHERAPY RECORDS: 7000
CHEMOTHERAPY RECORDS: NORMAL
CHEMOTHERAPY RX CANCER: NORMAL
DATE 1ST CHEMO CANCER: NORMAL
RAD ONC ARIA COURSE LAST TREATMENT DATE: NORMAL
RAD ONC ARIA COURSE TREATMENT ELAPSED DAYS: NORMAL
RAD ONC ARIA REFERENCE POINT DOSAGE GIVEN TO DATE: 23.59
RAD ONC ARIA REFERENCE POINT DOSAGE GIVEN TO DATE: 26
RAD ONC ARIA REFERENCE POINT ID: NORMAL
RAD ONC ARIA REFERENCE POINT ID: NORMAL
RAD ONC ARIA REFERENCE POINT SESSION DOSAGE GIVEN: 1.81
RAD ONC ARIA REFERENCE POINT SESSION DOSAGE GIVEN: 2

## 2021-09-02 PROCEDURE — 77014 PR CT GUIDANCE PLACEMENT RAD THERAPY FIELDS: CPT | Mod: 26 | Performed by: RADIOLOGY

## 2021-09-02 PROCEDURE — 77336 RADIATION PHYSICS CONSULT: CPT | Performed by: RADIOLOGY

## 2021-09-02 PROCEDURE — 77386 HCHG IMRT DELIVERY COMPLEX: CPT | Performed by: RADIOLOGY

## 2021-09-02 NOTE — ON TREATMENT VISIT
"  ON TREATMENT  NOTE  RADIATION ONCOLOGY DEPARTMENT    Patient name:  Ina Small    Primary Physician:  HENRRY Earl MRN: [id number]  CSN: [id number]   Referring physician:  Bigg Ren M.D. : 10/28/1933, 87 y.o.     ENCOUNTER DATE:  2021      DIAGNOSIS:  Tonsil cancer (HCC)  Staging form: Pharynx - HPV-Mediated Oropharynx, AJCC 8th Edition  - Clinical stage from 2021: Stage I (cT1, cN1, cM0, p16+) - Signed by Bailee CASTANEDA M.D. on 2021  Histopathologic type: Squamous cell carcinoma, NOS  Stage prefix: Initial diagnosis      TREATMENT SUMMARY:  [facility] Treatment Information        Some values may be hidden. Unless noted otherwise, only the newest values recorded on each date are displayed.         [facility] Treatment Summary 21   Course First Treatment Date 2021   Course Last Treatment Date 2021   L Tonsil [HN] Plan from Course [id number]   Fraction 6 of 35   Elapsed Course Days 8 @ [id number]   Prescribed Fraction Dose 200 cGy   Prescribed Total Dose 7,000 cGy       SUBJECTIVE:  No complaints.      VITAL SIGNS:  Vitals 2021 2021 2021 2021 2021 7/15/2021 2019   SYSTOLIC 156 144 130 158 139 148 124   DIASTOLIC 81 77 82 77 79 88 56   PULSE 67 83 94 76 96 60 64   TEMPERATURE - 97.2 98 97.7 97.3 98.2 97.9   RESPIRATIONS - - 16 - - 16 18   WEIGHT 154.21 153.11 153 153 150 156.31 -   HEIGHT - - 5' 5.984\" - 5' 6\" 5' 6\" -   BMI 24.9 kg/m2 24.72 kg/m2 24.71 kg/m2 24.69 kg/m2 24.21 kg/m2 25.23 kg/m2 -   SPO2 90 95 94 93 95 91 98     KPS: 70, Cares for self; unable to carry on normal activity or to do active work (ECOG equivalent 1)  Encounter Vitals  Temperature: 36.2 °C (97.2 °F)  Blood Pressure : 144/77  Pulse: 83  Pulse Oximetry: 95 %  Weight: 69.4 kg (153 lb 1.8 oz)  Pain Score: No pain  Pain Assessment 2021   Pain Score 1 0 1 1   Pain Loc Throat - Throat Throat   Some recent data might be hidden      "     PHYSICAL EXAM:  Physical Exam  Vitals and nursing note reviewed.   Constitutional:       Appearance: She is well-developed.   HENT:      Head: Normocephalic.   Skin:     General: Skin is warm and dry.      Findings: No erythema.   Neurological:      Mental Status: She is alert and oriented to person, place, and time.   Psychiatric:         Behavior: Behavior normal.         Thought Content: Thought content normal.         Judgment: Judgment normal.          Toxicity Assessment 9/1/2021 8/25/2021 8/18/2021   Toxicity Assessment Head/Neck Head/Neck Head/Neck   Fatigue (lethargy, malaise, asthenia) Increased fatigue over baseline, but not altering normal activities None None   Radiation Dermatitis None None None   Rash/desquamation None None None   Constipation None None None   Dehydration None None None   Mouth Dryness Normal Normal Normal   RT Dysphagia-Pharyngeal Mild dysphagia, but can eat regular diet Mild dysphagia, but can eat regular diet None   Mucositis None Erythema of mucosa None   Salivary Gland Changes Sticky thickened saliva, may have slightly altered taste (e.g. metallic), additional fluids may be required Sticky thickened saliva, may have slightly altered taste (e.g. metallic), additional fluids may be required None   Stomatitis/Pharyngitis - - None   Taste Disturbance (dysgeusia) Slightly altered Slightly altered Normal   RT - Pain due to RT Mild pain not interfering with function None None   Cough Absent Absent Absent   Voice changes/stridor/larynx (hoarseness, loss of voice, laryngitis) Mild or intermittent hoarseness Normal Normal       CURRENT MEDICATIONS:    Current Outpatient Medications:   •  Ibuprofen (ADVIL) 200 MG Cap, Take  by mouth., Disp: , Rfl:   •  ALPRAZolam (XANAX) 0.25 MG Tab, Take 0.25 mg by mouth at bedtime as needed for Sleep., Disp: , Rfl:   •  HYDROcodone-acetaminophen 2.5-108 mg/5mL (HYCET) 7.5-325 MG/15ML solution, TAKE 15 ML BY MOUTH EVERY 6 HOURS AS NEEDED FOR PAIN FOR 7  DAYS, Disp: , Rfl:   •  chlorhexidine (PERIDEX) 0.12 % Solution, Take 15 mL by mouth as needed., Disp: , Rfl:   •  conjugated estrogen (PREMARIN) 0.625 MG/GM Cream, Insert 0.5 g in vagina 1 time daily as needed. (Patient not taking: Reported on 8/25/2021), Disp: , Rfl:   •  fluticasone (FLONASE) 50 MCG/ACT nasal spray, Spray 1-2 Sprays in nose 1 time daily as needed (for allergies)., Disp: , Rfl:   •  fexofenadine (ALLEGRA ALLERGY) 180 MG tablet, Take 180 mg by mouth every morning., Disp: , Rfl:   •  hydroCHLOROthiazide (HYDRODIURIL) 25 MG Tab, Take 25 mg by mouth every morning., Disp: , Rfl:   •  gabapentin (NEURONTIN) 300 MG CAPS, Take 600 mg by mouth 2 times a day., Disp: , Rfl:     LABORATORY DATA:   Lab Results   Component Value Date/Time    SODIUM 132 (L) 07/01/2021 01:45 PM    POTASSIUM 3.8 07/01/2021 01:45 PM    CHLORIDE 93 (L) 07/01/2021 01:45 PM    CO2 28 07/01/2021 01:45 PM    GLUCOSE 112 (H) 07/01/2021 01:45 PM    BUN 14 07/01/2021 01:45 PM    CREATININE 0.97 07/01/2021 01:45 PM       Lab Results   Component Value Date/Time    WBC 7.0 07/01/2021 01:45 PM    RBC 5.35 07/01/2021 01:45 PM    HEMOGLOBIN 15.4 07/01/2021 01:45 PM    HEMATOCRIT 46.9 07/01/2021 01:45 PM    MCV 87.7 07/01/2021 01:45 PM    MCH 28.8 07/01/2021 01:45 PM    MCHC 32.8 (L) 07/01/2021 01:45 PM    PLATELETCT 256 07/01/2021 01:45 PM         RADIOLOGY DATA:  MR-SOFT TISSUE NECK-WITH & W/O    Result Date: 7/23/2021  1.  Post gadolinium sequences demonstrates diffuse mucosal enhancement in the bilateral lateral and posterior pharyngeal wall, posterior tongue and uvula. The uvula is hypertrophied. This findings likely represent posttreatment/postradiation induced inflammation. There is no large mass. Please note the small mucosal lesions cannot be evaluated in this study. 2.  Enlarged left jugulodigastric lymph node consistent with metastatic lymphadenopathy.    SI-ERADA-[id number] BASE TO MID-THIGH    Result Date: 7/26/2021  1.  Intense uptake in  the lateral pharynx on both sides, most likely postoperative healing response.  Residual tumor is difficult to entirely exclude. 2.  Focal uptake in the floor the mouth anteriorly most likely represents salivary pooling. 3.  No evidence for local lymph node involvement or distant metastatic disease. 4.  RIGHT pelvic kidney.      IMPRESSION:  Cancer Staging  Tonsil cancer (HCC)  Staging form: Pharynx - HPV-Mediated Oropharynx, AJCC 8th Edition  - Clinical stage from 7/30/2021: Stage I (cT1, cN1, cM0, p16+) - Signed by Bailee CASTANEDA M.D. on 7/30/2021      PLAN:  No change in treatment plan    Disposition:  Treatment plan and imaging reviewed. Questions answered. Continue therapy outlined.     Bailee CASTANEDA M.D.    No orders of the defined types were placed in this encounter.

## 2021-09-03 ENCOUNTER — HOSPITAL ENCOUNTER (OUTPATIENT)
Dept: RADIATION ONCOLOGY | Facility: MEDICAL CENTER | Age: 86
End: 2021-09-03
Payer: MEDICARE

## 2021-09-03 LAB
CHEMOTHERAPY INFUSION START DATE: NORMAL
CHEMOTHERAPY RECORDS: 2
CHEMOTHERAPY RECORDS: 7000
CHEMOTHERAPY RECORDS: NORMAL
CHEMOTHERAPY RX CANCER: NORMAL
DATE 1ST CHEMO CANCER: NORMAL
RAD ONC ARIA COURSE LAST TREATMENT DATE: NORMAL
RAD ONC ARIA COURSE TREATMENT ELAPSED DAYS: NORMAL
RAD ONC ARIA REFERENCE POINT DOSAGE GIVEN TO DATE: 25.4
RAD ONC ARIA REFERENCE POINT DOSAGE GIVEN TO DATE: 28
RAD ONC ARIA REFERENCE POINT ID: NORMAL
RAD ONC ARIA REFERENCE POINT ID: NORMAL
RAD ONC ARIA REFERENCE POINT SESSION DOSAGE GIVEN: 1.81
RAD ONC ARIA REFERENCE POINT SESSION DOSAGE GIVEN: 2

## 2021-09-03 PROCEDURE — 77014 PR CT GUIDANCE PLACEMENT RAD THERAPY FIELDS: CPT | Mod: 26 | Performed by: RADIOLOGY

## 2021-09-03 PROCEDURE — 77386 HCHG IMRT DELIVERY COMPLEX: CPT | Performed by: RADIOLOGY

## 2021-09-07 ENCOUNTER — HOSPITAL ENCOUNTER (OUTPATIENT)
Dept: RADIATION ONCOLOGY | Facility: MEDICAL CENTER | Age: 86
End: 2021-09-07
Payer: MEDICARE

## 2021-09-07 LAB
CHEMOTHERAPY INFUSION START DATE: NORMAL
CHEMOTHERAPY RECORDS: 2
CHEMOTHERAPY RECORDS: 7000
CHEMOTHERAPY RECORDS: NORMAL
CHEMOTHERAPY RX CANCER: NORMAL
DATE 1ST CHEMO CANCER: NORMAL
RAD ONC ARIA COURSE LAST TREATMENT DATE: NORMAL
RAD ONC ARIA COURSE TREATMENT ELAPSED DAYS: NORMAL
RAD ONC ARIA REFERENCE POINT DOSAGE GIVEN TO DATE: 27.22
RAD ONC ARIA REFERENCE POINT DOSAGE GIVEN TO DATE: 30
RAD ONC ARIA REFERENCE POINT ID: NORMAL
RAD ONC ARIA REFERENCE POINT ID: NORMAL
RAD ONC ARIA REFERENCE POINT SESSION DOSAGE GIVEN: 1.81
RAD ONC ARIA REFERENCE POINT SESSION DOSAGE GIVEN: 2

## 2021-09-07 PROCEDURE — 77386 HCHG IMRT DELIVERY COMPLEX: CPT | Performed by: RADIOLOGY

## 2021-09-07 PROCEDURE — 77014 PR CT GUIDANCE PLACEMENT RAD THERAPY FIELDS: CPT | Mod: 26 | Performed by: RADIOLOGY

## 2021-09-07 PROCEDURE — 77427 RADIATION TX MANAGEMENT X5: CPT | Performed by: RADIOLOGY

## 2021-09-08 ENCOUNTER — DOCUMENTATION (OUTPATIENT)
Dept: RADIATION ONCOLOGY | Facility: MEDICAL CENTER | Age: 86
End: 2021-09-08

## 2021-09-08 ENCOUNTER — HOSPITAL ENCOUNTER (OUTPATIENT)
Dept: RADIATION ONCOLOGY | Facility: MEDICAL CENTER | Age: 86
End: 2021-09-08
Payer: MEDICARE

## 2021-09-08 VITALS
HEART RATE: 73 BPM | WEIGHT: 152.89 LBS | DIASTOLIC BLOOD PRESSURE: 68 MMHG | SYSTOLIC BLOOD PRESSURE: 147 MMHG | BODY MASS INDEX: 24.69 KG/M2 | OXYGEN SATURATION: 96 %

## 2021-09-08 LAB
CHEMOTHERAPY INFUSION START DATE: NORMAL
CHEMOTHERAPY RECORDS: 2
CHEMOTHERAPY RECORDS: 7000
CHEMOTHERAPY RECORDS: NORMAL
CHEMOTHERAPY RX CANCER: NORMAL
DATE 1ST CHEMO CANCER: NORMAL
RAD ONC ARIA COURSE LAST TREATMENT DATE: NORMAL
RAD ONC ARIA COURSE TREATMENT ELAPSED DAYS: NORMAL
RAD ONC ARIA REFERENCE POINT DOSAGE GIVEN TO DATE: 29.03
RAD ONC ARIA REFERENCE POINT DOSAGE GIVEN TO DATE: 32
RAD ONC ARIA REFERENCE POINT ID: NORMAL
RAD ONC ARIA REFERENCE POINT ID: NORMAL
RAD ONC ARIA REFERENCE POINT SESSION DOSAGE GIVEN: 1.81
RAD ONC ARIA REFERENCE POINT SESSION DOSAGE GIVEN: 2

## 2021-09-08 PROCEDURE — 77386 HCHG IMRT DELIVERY COMPLEX: CPT | Performed by: RADIOLOGY

## 2021-09-08 PROCEDURE — 77014 PR CT GUIDANCE PLACEMENT RAD THERAPY FIELDS: CPT | Mod: 26 | Performed by: RADIOLOGY

## 2021-09-08 ASSESSMENT — PAIN SCALES - GENERAL: PAINLEVEL: 2=MINIMAL-SLIGHT

## 2021-09-08 NOTE — PROGRESS NOTES
Nutrition Services: Brief Update  Wt Readings from Last 7 Encounters:   21 69.3 kg (152 lb 14.2 oz)   21 70 kg (154 lb 3.4 oz)   21 69.4 kg (153 lb)   21 69.4 kg (153 lb 1.8 oz)   21 69.4 kg (153 lb)   21 68 kg (150 lb)   07/15/21 70.9 kg (156 lb 4.9 oz)     Weight Change: wt remaining stable overall within past ~ > 1 month    RD able to visit pt and daughter, Yusra, following OTV. Pt mentions is managing fine overall, though continues to have some throat discomfort. Yusra mentions pt has endorsed taste changes and is worried this will worsen and impact intake. Asks if pt would be able to meet nutrition needs with homemade smoothies BID and Ensure Plus BID, if swallowing pains worsens and taste changes persist. Pt describes food as not having a taste, though is still able to perceive some sweetness. States able to have toast with honey and a banana and this tasted well.     Describes smoothie content as: (drinks 1/2 of this per day)  (RD calorie estimations provided below)  1/2 of an avocado (161 kcal)  1/2 cup almond milk (22 kcal)  1 cup coconut water (45 kcal)  1 banana (110 kcal)  1.5 scoops of Orgain protein powder (113 kcal, 15 grams protein)  1/2 cup non-dairy yogurt (50 kcal)    Ensure Plus: 350 kcal/ 13 grams protein     Estimated nutrition needs:  Calories: 25-30 kcal/k - 2079 kcal/day (lower kcal needs suitable given no degree of weight loss at this time)  Protein: 1.2-1.5g/k - 103 grams per day. (Lower protein needs likely sufficient given advanced age)    Plan/Recommend:  • Per Yusra's questions regarding intake of 2 smoothies per day and 2 Ensure Plus, pt would receive approximately 1201 kcal and 41 grams of protein. RD discussed this would not be sufficient in calories or protein, though discussed could modify this if needed. Discussed RD could assist in ensuring caloric and protein needs are met through fluids, as well as vitamins, minerals, and hydration  through additional water or liquid MVI if needed. Discussed likely may benefit from added protein and choosing more calorically dense liquids in smoothies, such as soymilk or juice.   • Discussed ways to increase taste perception, such as tongue exfoliation, swishing with black coffee/ carbonated water prior to meals, adding sweet flavors to foods.   • Discussed hypogeusia may worsens as treatment progresses. Provided sample of Miracle Arreola with written and verbal instructions for use. Discussed effects may be hit and miss, though worth trying. Pt receptive.     Pt verbalizes understanding and is receptive to information provided. RD to continue to monitor throughout treatment and provide nutrition recommendations as indicated.  Please contact [phone]

## 2021-09-08 NOTE — ON TREATMENT VISIT
"  ON TREATMENT  NOTE  RADIATION ONCOLOGY DEPARTMENT    Patient name:  Ina Small    Primary Physician:  HENRRY Ealr MRN: [id number]  CSN: [id number]   Referring physician:  Bigg Ren M.D. : 10/28/1933, 87 y.o.     ENCOUNTER DATE:  2021      DIAGNOSIS:  Tonsil cancer (HCC)  Staging form: Pharynx - HPV-Mediated Oropharynx, AJCC 8th Edition  - Clinical stage from 2021: Stage I (cT1, cN1, cM0, p16+) - Signed by Bailee CASTANEDA M.D. on 2021  Histopathologic type: Squamous cell carcinoma, NOS  Stage prefix: Initial diagnosis      TREATMENT SUMMARY:  [facility] Treatment Information        Some values may be hidden. Unless noted otherwise, only the newest values recorded on each date are displayed.         [facility] Treatment Summary 21   Course First Treatment Date 2021   Course Last Treatment Date 2021   L Tonsil [HN] Plan from Course C1_[id number]   Fraction 16 of 35   Elapsed Course Days  @ [id number]   Prescribed Fraction Dose 200 cGy   Prescribed Total Dose 7,000 cGy   HN cp Reference Point from Course C1_[id number]   Elapsed Course Days  @ [id number]   Session Dose 181 cGy   Total Dose 2,903 cGy   PTV70 Reference Point from Course C1_[id number]   Elapsed Course Days  @ [id number]   Session Dose 200 cGy   Total Dose 3,200 cGy                SUBJECTIVE:  Mild sore throat. No taste. Dry mouth.      VITAL SIGNS:  Vitals 2021 2021 2021 2021 2021 2021 7/15/2021   SYSTOLIC 147 156 144 130 158 139 148   DIASTOLIC 68 81 77 82 77 79 88   PULSE 73 67 83 94 76 96 60   TEMPERATURE - - 97.2 98 97.7 97.3 98.2   RESPIRATIONS - - - 16 - - 16   WEIGHT 152.89 154.21 153.11 153 153 150 156.31   HEIGHT - - - 5' 5.984\" - 5' 6\" 5' 6\"   BMI 24.69 kg/m2 24.9 kg/m2 24.72 kg/m2 24.71 kg/m2 24.69 kg/m2 24.21 kg/m2 25.23 kg/m2   SPO2 96 90 95 94 93 95 91     KPS: 70, Cares for self; unable to carry on normal activity or to do active work (ECOG " equivalent 1)  Encounter Vitals  Blood Pressure : 147/68  Pulse: 73  Pulse Oximetry: 96 %  Weight: 69.3 kg (152 lb 14.2 oz)  Pain Score: 2=Minimal-Slight  Pain Assessment 9/8/2021 9/1/2021 8/25/2021 8/25/2021   Pain Score 2 1 0 1   Pain Loc Throat Throat - Throat   Some recent data might be hidden          PHYSICAL EXAM:  Physical Exam  Vitals and nursing note reviewed.   Constitutional:       Appearance: She is well-developed.   HENT:      Head: Normocephalic.   Skin:     General: Skin is warm and dry.      Findings: No erythema.   Neurological:      Mental Status: She is alert and oriented to person, place, and time.   Psychiatric:         Behavior: Behavior normal.         Thought Content: Thought content normal.         Judgment: Judgment normal.          Toxicity Assessment 9/8/2021 9/1/2021 8/25/2021 8/18/2021   Toxicity Assessment Head/Neck Head/Neck Head/Neck Head/Neck   Fatigue (lethargy, malaise, asthenia) Increased fatigue over baseline, but not altering normal activities Increased fatigue over baseline, but not altering normal activities None None   Radiation Dermatitis None None None None   Rash/desquamation None None None None   Constipation None None None None   Dehydration Dry mucous membranes and/or diminished skin turgor None None None   Mouth Dryness Mild Normal Normal Normal   RT Dysphagia-Pharyngeal Mild dysphagia, but can eat regular diet Mild dysphagia, but can eat regular diet Mild dysphagia, but can eat regular diet None   Mucositis Erythema of mucosa None Erythema of mucosa None   Salivary Gland Changes Sticky thickened saliva, may have slightly altered taste (e.g. metallic), additional fluids may be required Sticky thickened saliva, may have slightly altered taste (e.g. metallic), additional fluids may be required Sticky thickened saliva, may have slightly altered taste (e.g. metallic), additional fluids may be required None   Stomatitis/Pharyngitis - - - None   Taste Disturbance  (dysgeusia) Markedly altered Slightly altered Slightly altered Normal   RT - Pain due to RT Mild pain not interfering with function Mild pain not interfering with function None None   Cough Absent Absent Absent Absent   Voice changes/stridor/larynx (hoarseness, loss of voice, laryngitis) Normal Mild or intermittent hoarseness Normal Normal       CURRENT MEDICATIONS:    Current Outpatient Medications:   •  Ibuprofen (ADVIL) 200 MG Cap, Take  by mouth., Disp: , Rfl:   •  ALPRAZolam (XANAX) 0.25 MG Tab, Take 0.25 mg by mouth at bedtime as needed for Sleep., Disp: , Rfl:   •  HYDROcodone-acetaminophen 2.5-108 mg/5mL (HYCET) 7.5-325 MG/15ML solution, TAKE 15 ML BY MOUTH EVERY 6 HOURS AS NEEDED FOR PAIN FOR 7 DAYS, Disp: , Rfl:   •  chlorhexidine (PERIDEX) 0.12 % Solution, Take 15 mL by mouth as needed., Disp: , Rfl:   •  fluticasone (FLONASE) 50 MCG/ACT nasal spray, Spray 1-2 Sprays in nose 1 time daily as needed (for allergies)., Disp: , Rfl:   •  fexofenadine (ALLEGRA ALLERGY) 180 MG tablet, Take 180 mg by mouth every morning., Disp: , Rfl:   •  hydroCHLOROthiazide (HYDRODIURIL) 25 MG Tab, Take 25 mg by mouth every morning., Disp: , Rfl:   •  gabapentin (NEURONTIN) 300 MG CAPS, Take 600 mg by mouth 2 times a day., Disp: , Rfl:     LABORATORY DATA:   Lab Results   Component Value Date/Time    SODIUM 132 (L) 07/01/2021 01:45 PM    POTASSIUM 3.8 07/01/2021 01:45 PM    CHLORIDE 93 (L) 07/01/2021 01:45 PM    CO2 28 07/01/2021 01:45 PM    GLUCOSE 112 (H) 07/01/2021 01:45 PM    BUN 14 07/01/2021 01:45 PM    CREATININE 0.97 07/01/2021 01:45 PM       Lab Results   Component Value Date/Time    WBC 7.0 07/01/2021 01:45 PM    RBC 5.35 07/01/2021 01:45 PM    HEMOGLOBIN 15.4 07/01/2021 01:45 PM    HEMATOCRIT 46.9 07/01/2021 01:45 PM    MCV 87.7 07/01/2021 01:45 PM    MCH 28.8 07/01/2021 01:45 PM    MCHC 32.8 (L) 07/01/2021 01:45 PM    PLATELETCT 256 07/01/2021 01:45 PM         RADIOLOGY DATA:  MR-SOFT TISSUE NECK-WITH &  W/O    Result Date: 7/23/2021  1.  Post gadolinium sequences demonstrates diffuse mucosal enhancement in the bilateral lateral and posterior pharyngeal wall, posterior tongue and uvula. The uvula is hypertrophied. This findings likely represent posttreatment/postradiation induced inflammation. There is no large mass. Please note the small mucosal lesions cannot be evaluated in this study. 2.  Enlarged left jugulodigastric lymph node consistent with metastatic lymphadenopathy.    [id number] BASE TO MID-THIGH    Result Date: 7/26/2021  1.  Intense uptake in the lateral pharynx on both sides, most likely postoperative healing response.  Residual tumor is difficult to entirely exclude. 2.  Focal uptake in the floor the mouth anteriorly most likely represents salivary pooling. 3.  No evidence for local lymph node involvement or distant metastatic disease. 4.  RIGHT pelvic kidney.      IMPRESSION:  Cancer Staging  Tonsil cancer (HCC)  Staging form: Pharynx - HPV-Mediated Oropharynx, AJCC 8th Edition  - Clinical stage from 7/30/2021: Stage I (cT1, cN1, cM0, p16+) - Signed by Bailee CASTANEDA M.D. on 7/30/2021      PLAN:  No change in treatment plan    Disposition:  Treatment plan and imaging reviewed. Questions answered. Continue therapy outlined.     Bailee CASTANEDA M.D.    No orders of the defined types were placed in this encounter.

## 2021-09-09 ENCOUNTER — HOSPITAL ENCOUNTER (OUTPATIENT)
Dept: RADIATION ONCOLOGY | Facility: MEDICAL CENTER | Age: 86
End: 2021-09-09
Payer: MEDICARE

## 2021-09-09 LAB
CHEMOTHERAPY INFUSION START DATE: NORMAL
CHEMOTHERAPY RECORDS: 2
CHEMOTHERAPY RECORDS: 7000
CHEMOTHERAPY RECORDS: NORMAL
CHEMOTHERAPY RX CANCER: NORMAL
DATE 1ST CHEMO CANCER: NORMAL
RAD ONC ARIA COURSE LAST TREATMENT DATE: NORMAL
RAD ONC ARIA COURSE TREATMENT ELAPSED DAYS: NORMAL
RAD ONC ARIA REFERENCE POINT DOSAGE GIVEN TO DATE: 30.85
RAD ONC ARIA REFERENCE POINT DOSAGE GIVEN TO DATE: 34
RAD ONC ARIA REFERENCE POINT ID: NORMAL
RAD ONC ARIA REFERENCE POINT ID: NORMAL
RAD ONC ARIA REFERENCE POINT SESSION DOSAGE GIVEN: 1.81
RAD ONC ARIA REFERENCE POINT SESSION DOSAGE GIVEN: 2

## 2021-09-09 PROCEDURE — 77014 PR CT GUIDANCE PLACEMENT RAD THERAPY FIELDS: CPT | Mod: 26 | Performed by: RADIOLOGY

## 2021-09-09 PROCEDURE — 77386 HCHG IMRT DELIVERY COMPLEX: CPT | Performed by: RADIOLOGY

## 2021-09-09 NOTE — OP REPORT
DATE OF SERVICE:  07/15/2021     SURGEON:  Bigg Ren MD     ASSISTANT:  None.     ANESTHESIA:  General given by endotracheal tube.     ANESTHESIOLOGIST:  Dr. Doherty.     PREOPERATIVE DIAGNOSIS:  Neoplasm left tonsil, behavior uncertain.     POSTOPERATIVE DIAGNOSIS:  Neoplasm left tonsil, behavior uncertain, pathology   pending.     NAME OF PROCEDURE:  Tonsillectomy, bilateral.     INDICATIONS:  The patient is an 87-year-old woman who has had   abnormal-appearing left tonsil on examination.  She presents now for a   tonsillectomy for biopsy purposes.     DESCRIPTION OF PROCEDURE:  The patient was brought to the operating room and   placed in supine position on the operating table.  General anesthesia was   induced and  the patient was endotracheally intubated without difficulty.    Eyes were protected with taping and the table was turned 90 degrees.  A   shoulder roll was placed beneath the shoulders to extend the neck.  Head drape   was placed about the head.  A McIvor mouth gag is introduced into the oral   cavity, taking care not to dislodge endotracheal tube.     Upon opening the retractor, the patient was noted to have abnormal-appearing   left tonsil.  No cleft or submucous cleft to the palate was noted.  Attention   was first directed to left tonsil where an anterior tonsillar pillar incision   was made followed by identification of the tonsil capsule superiorly.    Dissection was undertaken along the capsule from a superior to inferior   direction, although there is noted to be marked fibrosis and scarring or   possible desmoplastic change present along the lateral inferior aspects of the   tonsillar capsule, making dissection somewhat more tedious and difficult.    Dissection was undertaken using the gold laser at a power setting 14 lyn   continuous.  Once the tonsils has been dissected free, it was removed as a   specimen separately.  Bleeding in the wound is controlled with the laser in a   deep  focus fashion both during dissection as well as following removal of   tonsil.  Additional suction cauterization of bleeding points was undertaken in   the area of adherence along the tonsillar capsule.  Once bleeding is   controlled, the mouth gag is released for a minute and then reopened.     Attention was then directed to the right tonsil.  In a similar fashion, an   anterior tonsillar pillar incision was made followed by identification of   tonsillar capsule dissection along the capsule from a superior to inferior   direction using the gold laser at a power setting of 14 lyn continuous.    Bleeding was controlled with spot cauterization, bleeding points with the   laser in a deep focus fashion both during dissection as well as following   removal of tonsil.  Once bleeding is controlled, the mouth gag is released for   a minute and then reopened.     Because of difficulty with dissection and persistent oozing noted on the left   side, 2-0 chromic suture was used to approximate the anterior and posterior   tonsillar pillars along the area of the oozing to sew over the area in a   figure-of-eight fashion.  Once bleeding is controlled, the mouth gag is   released for a minute and then reopened.     No further significant bleeding is noted.  Approximately 6-7 mL of 0.5%   Marcaine solution with epinephrine was used to infiltrate both tonsillar fossa   for postoperative pain control.  Stomach contents were aspirated.  The mouth   gag was then removed.  The patient was subsequently awakened from anesthesia   and extubated without difficulty.  She was taken from the operating room to   the recovery area, awake, breathing on her own in stable condition.  There   were no apparent complications.  Blood loss was between 20 and 25 mL.        ______________________________  MD CHARLEEN BELL/BABAK    DD:  09/09/2021 10:57  DT:  09/09/2021 11:14    Job#:  [id number]

## 2021-09-10 ENCOUNTER — HOSPITAL ENCOUNTER (OUTPATIENT)
Dept: RADIATION ONCOLOGY | Facility: MEDICAL CENTER | Age: 86
End: 2021-09-10

## 2021-09-10 ENCOUNTER — SPEECH THERAPY (OUTPATIENT)
Dept: SPEECH THERAPY | Facility: REHABILITATION | Age: 86
End: 2021-09-10
Attending: RADIOLOGY
Payer: MEDICARE

## 2021-09-10 DIAGNOSIS — C09.9 TONSIL CANCER (HCC): ICD-10-CM

## 2021-09-10 DIAGNOSIS — R13.10 DYSPHAGIA, UNSPECIFIED TYPE: ICD-10-CM

## 2021-09-10 LAB
CHEMOTHERAPY INFUSION START DATE: NORMAL
CHEMOTHERAPY RECORDS: 2
CHEMOTHERAPY RECORDS: 7000
CHEMOTHERAPY RECORDS: NORMAL
CHEMOTHERAPY RX CANCER: NORMAL
DATE 1ST CHEMO CANCER: NORMAL
RAD ONC ARIA COURSE LAST TREATMENT DATE: NORMAL
RAD ONC ARIA COURSE TREATMENT ELAPSED DAYS: NORMAL
RAD ONC ARIA REFERENCE POINT DOSAGE GIVEN TO DATE: 32.66
RAD ONC ARIA REFERENCE POINT DOSAGE GIVEN TO DATE: 36
RAD ONC ARIA REFERENCE POINT ID: NORMAL
RAD ONC ARIA REFERENCE POINT ID: NORMAL
RAD ONC ARIA REFERENCE POINT SESSION DOSAGE GIVEN: 1.81
RAD ONC ARIA REFERENCE POINT SESSION DOSAGE GIVEN: 2

## 2021-09-10 PROCEDURE — 77014 PR CT GUIDANCE PLACEMENT RAD THERAPY FIELDS: CPT | Mod: 26 | Performed by: RADIOLOGY

## 2021-09-10 PROCEDURE — 92526 ORAL FUNCTION THERAPY: CPT

## 2021-09-10 PROCEDURE — 77386 HCHG IMRT DELIVERY COMPLEX: CPT | Performed by: RADIOLOGY

## 2021-09-10 PROCEDURE — 77336 RADIATION PHYSICS CONSULT: CPT | Performed by: RADIOLOGY

## 2021-09-10 ASSESSMENT — ENCOUNTER SYMPTOMS: PAIN SCALE AT HIGHEST: 1

## 2021-09-10 NOTE — OP THERAPY DAILY TREATMENT
"  Outpatient Speech Therapy  DAILY TREATMENT     [facility] Speech Therapy [address]  [address]  [place] [address]  Phone:  [phone]  Fax:  [phone]    Date: 09/10/2021    Patient: Ina Small  YOB: 1933  MRN: [id number]     Time Calculation    Start time: 1020  Stop time: 1055 Time Calculation (min): 35 minutes         Chief Complaint: Dysphagia    Visit #: 2    Subjective:   Reason for Therapy:     Reason For Evaluation:  Dysphagia (Patient has completed  radiotherapy)  Social Support:     Patient Mental Status:  Alert and Responsive  Progress Factors:     Progression:  Staying the same  Pain:     At worst pain ratin (described as \"just thinking about it\")  Therapy History:     Current Diet:  Modified Diet, Mechanical Soft Foods and Thin Liquids (soft and bite sized)    Nutritional Concerns Restrictions and Allergies:  Patient endorses she is currently eating \"anything\".   Additional Subjective Comments:      \"Each day the treatments make my throat a little sorer and I don't have any taste at all.\" Patient endorses changes to saliva \"the saliva keeps getting thicker and thicker\". Patient endorses \"for the most part I'm doing ok, I just don't have a lot of energy.\" Patient endorses she is address discomfort during swallow through Ibuprofen.     Patient endorses compliance with home exercise program addressing swallow.       Objective:   Treatments/Interventions Performed:  Patient/Caregiver education, Compensatory strategy training, Home program and Dysphagia treatment  Other Treatment Interventions:  Education in minced and moist solid textures  Objective Details:  Oral opening 5/5: jaw stretch, tongue ROM, coordination and strength    Pharyngeal phase 5/5: CTAR 10 second hold, 10 second rest x 5, Mendelsohn exercise with hold to 10 seconds, stretch exercise (including ear to shoulder, chin to shoulder), Supraglottic swallow, pitch glide /ee/, Chen exercise, " effortful swallow and gargle.          Speech Therapy Assessment:     Oral Motor Status:     Labial strength and control for patient: Good    Lingual strength and control for patient: Good    Patient saliva management: GoodPatient oral sensation and awareness: Good    Patient awareness of swallow problem: Good    Oral motor status comments: Patient demonstrated no change to oral motor structured, function related to swallow at this time.     Oral Phase Assessment:     Types of food within functional limits: Thin Liquid    Pharyngeal Phase Assessment:     Delayed Swallow    Food types within functional limits: Thin Liquid      Speech Therapy Plan :   Prognosis & Recommendations  Impression Summary:  Patient returned for ongoing, direct outpatient speech therapy addressing swallow in the setting of tonsil cancer, s/p tonsillectomy with patient currently participating in radiotherapy to address.    Patient returned to outpatient speech therapy seeking clarification regarding accuracy in completion of proactive swallow exercise with education and practice in exercise completed as part of today's session. Additionally, education provided in minced and moist solid textures, given patient reports of changes to saliva and benefit from modification of solids. Patient encouraged in her frequent intake throughout the day, reported to smoothies and other small snacks, in addition to meals, given patient reports of overall changes to taste impacting appetite.    Patient encouraged to implement minced and moist textures into her diet as necessary based on discomfort or pain experienced during swallow.   Therapy Recommendations  Recommendation:  Individual Speech Therapy,  Planned Therapy Interventions:  Home Program, Dysphagia treatment, Patient/Caregiver Education and Compensatory Strategy Training,   Plan Details:  Patient scheduled to return to outpatient speech therapy in 1 week. Patient encouraged to continue with proactive  swallow exercise, oral, pharyngeal hygiene and trial minced and moist solids as necessary to promote intake

## 2021-09-13 ENCOUNTER — HOSPITAL ENCOUNTER (OUTPATIENT)
Dept: RADIATION ONCOLOGY | Facility: MEDICAL CENTER | Age: 86
End: 2021-09-13
Payer: MEDICARE

## 2021-09-13 LAB
CHEMOTHERAPY INFUSION START DATE: NORMAL
CHEMOTHERAPY RECORDS: 2
CHEMOTHERAPY RECORDS: 7000
CHEMOTHERAPY RECORDS: NORMAL
CHEMOTHERAPY RX CANCER: NORMAL
DATE 1ST CHEMO CANCER: NORMAL
RAD ONC ARIA COURSE LAST TREATMENT DATE: NORMAL
RAD ONC ARIA COURSE TREATMENT ELAPSED DAYS: NORMAL
RAD ONC ARIA REFERENCE POINT DOSAGE GIVEN TO DATE: 34.47
RAD ONC ARIA REFERENCE POINT DOSAGE GIVEN TO DATE: 38
RAD ONC ARIA REFERENCE POINT ID: NORMAL
RAD ONC ARIA REFERENCE POINT ID: NORMAL
RAD ONC ARIA REFERENCE POINT SESSION DOSAGE GIVEN: 1.81
RAD ONC ARIA REFERENCE POINT SESSION DOSAGE GIVEN: 2

## 2021-09-13 PROCEDURE — 77386 HCHG IMRT DELIVERY COMPLEX: CPT | Performed by: RADIOLOGY

## 2021-09-13 PROCEDURE — 77014 PR CT GUIDANCE PLACEMENT RAD THERAPY FIELDS: CPT | Mod: 26 | Performed by: RADIOLOGY

## 2021-09-14 ENCOUNTER — HOSPITAL ENCOUNTER (OUTPATIENT)
Dept: RADIATION ONCOLOGY | Facility: MEDICAL CENTER | Age: 86
End: 2021-09-14
Payer: MEDICARE

## 2021-09-14 LAB
CHEMOTHERAPY INFUSION START DATE: NORMAL
CHEMOTHERAPY RECORDS: 2
CHEMOTHERAPY RECORDS: 7000
CHEMOTHERAPY RECORDS: NORMAL
CHEMOTHERAPY RX CANCER: NORMAL
DATE 1ST CHEMO CANCER: NORMAL
RAD ONC ARIA COURSE LAST TREATMENT DATE: NORMAL
RAD ONC ARIA COURSE TREATMENT ELAPSED DAYS: NORMAL
RAD ONC ARIA REFERENCE POINT DOSAGE GIVEN TO DATE: 36.29
RAD ONC ARIA REFERENCE POINT DOSAGE GIVEN TO DATE: 40
RAD ONC ARIA REFERENCE POINT ID: NORMAL
RAD ONC ARIA REFERENCE POINT ID: NORMAL
RAD ONC ARIA REFERENCE POINT SESSION DOSAGE GIVEN: 1.81
RAD ONC ARIA REFERENCE POINT SESSION DOSAGE GIVEN: 2

## 2021-09-14 PROCEDURE — 77014 PR CT GUIDANCE PLACEMENT RAD THERAPY FIELDS: CPT | Mod: 26 | Performed by: RADIOLOGY

## 2021-09-14 PROCEDURE — 77386 HCHG IMRT DELIVERY COMPLEX: CPT | Performed by: RADIOLOGY

## 2021-09-14 PROCEDURE — 77427 RADIATION TX MANAGEMENT X5: CPT | Performed by: RADIOLOGY

## 2021-09-15 ENCOUNTER — HOSPITAL ENCOUNTER (OUTPATIENT)
Dept: RADIATION ONCOLOGY | Facility: MEDICAL CENTER | Age: 86
End: 2021-09-15
Payer: MEDICARE

## 2021-09-15 VITALS
OXYGEN SATURATION: 92 % | SYSTOLIC BLOOD PRESSURE: 145 MMHG | WEIGHT: 152 LBS | BODY MASS INDEX: 24.55 KG/M2 | TEMPERATURE: 98.4 F | HEART RATE: 71 BPM | DIASTOLIC BLOOD PRESSURE: 80 MMHG

## 2021-09-15 LAB
CHEMOTHERAPY INFUSION START DATE: NORMAL
CHEMOTHERAPY RECORDS: 2
CHEMOTHERAPY RECORDS: 7000
CHEMOTHERAPY RECORDS: NORMAL
CHEMOTHERAPY RX CANCER: NORMAL
DATE 1ST CHEMO CANCER: NORMAL
RAD ONC ARIA COURSE LAST TREATMENT DATE: NORMAL
RAD ONC ARIA COURSE TREATMENT ELAPSED DAYS: NORMAL
RAD ONC ARIA REFERENCE POINT DOSAGE GIVEN TO DATE: 38.1
RAD ONC ARIA REFERENCE POINT DOSAGE GIVEN TO DATE: 42
RAD ONC ARIA REFERENCE POINT ID: NORMAL
RAD ONC ARIA REFERENCE POINT ID: NORMAL
RAD ONC ARIA REFERENCE POINT SESSION DOSAGE GIVEN: 1.81
RAD ONC ARIA REFERENCE POINT SESSION DOSAGE GIVEN: 2

## 2021-09-15 PROCEDURE — 77014 PR CT GUIDANCE PLACEMENT RAD THERAPY FIELDS: CPT | Mod: 26 | Performed by: RADIOLOGY

## 2021-09-15 PROCEDURE — 77386 HCHG IMRT DELIVERY COMPLEX: CPT | Performed by: RADIOLOGY

## 2021-09-15 ASSESSMENT — PAIN SCALES - GENERAL: PAINLEVEL: 1=MINIMAL PAIN

## 2021-09-15 NOTE — ON TREATMENT VISIT
"  ON TREATMENT  NOTE  RADIATION ONCOLOGY DEPARTMENT    Patient name:  Ina Small    Primary Physician:  HENRRY Earl MRN: [id number]  CSN: [id number]   Referring physician:  No ref. provider found : 10/28/1933, 87 y.o.     ENCOUNTER DATE:  9/15/2021      DIAGNOSIS:  Tonsil cancer (HCC)  Staging form: Pharynx - HPV-Mediated Oropharynx, AJCC 8th Edition  - Clinical stage from 2021: Stage I (cT1, cN1, cM0, p16+) - Signed by Bailee CASTANEDA M.D. on 2021  Histopathologic type: Squamous cell carcinoma, NOS  Stage prefix: Initial diagnosis      TREATMENT SUMMARY:  [facility] Treatment Information        Some values may be hidden. Unless noted otherwise, only the newest values recorded on each date are displayed.         [facility] Treatment Summary 9/15/21   Course First Treatment Date 2021   Course Last Treatment Date 09/15/2021   L Tonsil [HN] Plan from Course [id number]   Fraction 21 of 35   Elapsed Course Days  @ [id number]   Prescribed Fraction Dose 200 cGy   Prescribed Total Dose 7,000 cGy   HN cp Reference Point from Course [id number]   Elapsed Course Days  @ [id number]   Session Dose 181 cGy   Total Dose 3,810 cGy   PTV70 Reference Point from Course [id number]   Elapsed Course Days  @ [id number]   Session Dose 200 cGy   Total Dose 4,200 cGy                SUBJECTIVE:  Mild xerostomia, pharyngitis, and impaired taste.      VITAL SIGNS:  Vitals 9/15/2021 2021 2021 2021 2021 2021 2021   SYSTOLIC 145 147 156 144 130 158 139   DIASTOLIC 80 68 81 77 82 77 79   PULSE 71 73 67 83 94 76 96   TEMPERATURE 98.4 - - 97.2 98 97.7 97.3   RESPIRATIONS - - - - 16 - -   WEIGHT 152 152.89 154.21 153.11 153 153 150   HEIGHT - - - - 5' 5.984\" - 5' 6\"   BMI 24.55 kg/m2 24.69 kg/m2 24.9 kg/m2 24.72 kg/m2 24.71 kg/m2 24.69 kg/m2 24.21 kg/m2   SPO2 92 96 90 95 94 93 95     KPS: 70, Cares for self; unable to carry on normal activity or to do active work (ECOG " equivalent 1)  Encounter Vitals  Temperature: 36.9 °C (98.4 °F)  Temp src: Temporal  Blood Pressure : 145/80  Pulse: 71  Pulse Oximetry: 92 %  Weight: 68.9 kg (152 lb)  Pain Score: 1=Minimal Pain  Pain Assessment 9/15/2021 9/8/2021 9/1/2021   Pain Score 1 2 1   Pain Loc Mouth Throat Throat   Some recent data might be hidden          PHYSICAL EXAM:  Physical Exam  HENT:      Mouth/Throat:      Mouth: Mucous membranes are moist.      Pharynx: Oropharynx is clear. No oropharyngeal exudate or posterior oropharyngeal erythema.   Lymphadenopathy:      Cervical: No cervical adenopathy (response to therapy.).          Toxicity Assessment 9/15/2021 9/8/2021 9/1/2021 8/25/2021 8/18/2021   Toxicity Assessment Head/Neck Head/Neck Head/Neck Head/Neck Head/Neck   Fatigue (lethargy, malaise, asthenia) Increased fatigue over baseline, but not altering normal activities Increased fatigue over baseline, but not altering normal activities Increased fatigue over baseline, but not altering normal activities None None   Radiation Dermatitis Faint erythema or dry desquamation None None None None   Rash/desquamation None None None None None   Constipation None None None None None   Dehydration Dry mucous membranes and/or diminished skin turgor Dry mucous membranes and/or diminished skin turgor None None None   Mouth Dryness Mild Mild Normal Normal Normal   RT Dysphagia-Pharyngeal Mild dysphagia, but can eat regular diet Mild dysphagia, but can eat regular diet Mild dysphagia, but can eat regular diet Mild dysphagia, but can eat regular diet None   Mucositis Erythema of mucosa Erythema of mucosa None Erythema of mucosa None   Salivary Gland Changes Sticky thickened saliva, may have slightly altered taste (e.g. metallic), additional fluids may be required Sticky thickened saliva, may have slightly altered taste (e.g. metallic), additional fluids may be required Sticky thickened saliva, may have slightly altered taste (e.g. metallic),  additional fluids may be required Sticky thickened saliva, may have slightly altered taste (e.g. metallic), additional fluids may be required None   Stomatitis/Pharyngitis None - - - None   Taste Disturbance (dysgeusia) Markedly altered Markedly altered Slightly altered Slightly altered Normal   RT - Pain due to RT Mild pain not interfering with function Mild pain not interfering with function Mild pain not interfering with function None None   Cough Absent Absent Absent Absent Absent   Voice changes/stridor/larynx (hoarseness, loss of voice, laryngitis) Normal Normal Mild or intermittent hoarseness Normal Normal       CURRENT MEDICATIONS:    Current Outpatient Medications:   •  Ibuprofen (ADVIL) 200 MG Cap, Take  by mouth., Disp: , Rfl:   •  ALPRAZolam (XANAX) 0.25 MG Tab, Take 0.25 mg by mouth at bedtime as needed for Sleep., Disp: , Rfl:   •  HYDROcodone-acetaminophen 2.5-108 mg/5mL (HYCET) 7.5-325 MG/15ML solution, TAKE 15 ML BY MOUTH EVERY 6 HOURS AS NEEDED FOR PAIN FOR 7 DAYS, Disp: , Rfl:   •  chlorhexidine (PERIDEX) 0.12 % Solution, Take 15 mL by mouth as needed., Disp: , Rfl:   •  fluticasone (FLONASE) 50 MCG/ACT nasal spray, Spray 1-2 Sprays in nose 1 time daily as needed (for allergies)., Disp: , Rfl:   •  fexofenadine (ALLEGRA ALLERGY) 180 MG tablet, Take 180 mg by mouth every morning., Disp: , Rfl:   •  hydroCHLOROthiazide (HYDRODIURIL) 25 MG Tab, Take 25 mg by mouth every morning., Disp: , Rfl:   •  gabapentin (NEURONTIN) 300 MG CAPS, Take 600 mg by mouth 2 times a day., Disp: , Rfl:     LABORATORY DATA:   Lab Results   Component Value Date/Time    SODIUM 132 (L) 07/01/2021 01:45 PM    POTASSIUM 3.8 07/01/2021 01:45 PM    CHLORIDE 93 (L) 07/01/2021 01:45 PM    CO2 28 07/01/2021 01:45 PM    GLUCOSE 112 (H) 07/01/2021 01:45 PM    BUN 14 07/01/2021 01:45 PM    CREATININE 0.97 07/01/2021 01:45 PM       Lab Results   Component Value Date/Time    WBC 7.0 07/01/2021 01:45 PM    RBC 5.35 07/01/2021 01:45 PM     HEMOGLOBIN 15.4 07/01/2021 01:45 PM    HEMATOCRIT 46.9 07/01/2021 01:45 PM    MCV 87.7 07/01/2021 01:45 PM    MCH 28.8 07/01/2021 01:45 PM    MCHC 32.8 (L) 07/01/2021 01:45 PM    PLATELETCT 256 07/01/2021 01:45 PM         RADIOLOGY DATA:  MR-SOFT TISSUE NECK-WITH & W/O    Result Date: 7/23/2021  1.  Post gadolinium sequences demonstrates diffuse mucosal enhancement in the bilateral lateral and posterior pharyngeal wall, posterior tongue and uvula. The uvula is hypertrophied. This findings likely represent posttreatment/postradiation induced inflammation. There is no large mass. Please note the small mucosal lesions cannot be evaluated in this study. 2.  Enlarged left jugulodigastric lymph node consistent with metastatic lymphadenopathy.    [id number] BASE TO MID-THIGH    Result Date: 7/26/2021  1.  Intense uptake in the lateral pharynx on both sides, most likely postoperative healing response.  Residual tumor is difficult to entirely exclude. 2.  Focal uptake in the floor the mouth anteriorly most likely represents salivary pooling. 3.  No evidence for local lymph node involvement or distant metastatic disease. 4.  RIGHT pelvic kidney.      IMPRESSION:  Cancer Staging  Tonsil cancer (HCC)  Staging form: Pharynx - HPV-Mediated Oropharynx, AJCC 8th Edition  - Clinical stage from 7/30/2021: Stage I (cT1, cN1, cM0, p16+) - Signed by Bailee CASTANEDA M.D. on 7/30/2021      PLAN:  No change in treatment plan    Disposition:  Treatment plan and imaging reviewed. Questions answered. Continue therapy outlined.     Bailee CASTANEDA M.D.    No orders of the defined types were placed in this encounter.

## 2021-09-16 ENCOUNTER — HOSPITAL ENCOUNTER (OUTPATIENT)
Dept: RADIATION ONCOLOGY | Facility: MEDICAL CENTER | Age: 86
End: 2021-09-16
Payer: MEDICARE

## 2021-09-16 LAB
CHEMOTHERAPY INFUSION START DATE: NORMAL
CHEMOTHERAPY RECORDS: 2
CHEMOTHERAPY RECORDS: 7000
CHEMOTHERAPY RECORDS: NORMAL
CHEMOTHERAPY RX CANCER: NORMAL
DATE 1ST CHEMO CANCER: NORMAL
RAD ONC ARIA COURSE LAST TREATMENT DATE: NORMAL
RAD ONC ARIA COURSE TREATMENT ELAPSED DAYS: NORMAL
RAD ONC ARIA REFERENCE POINT DOSAGE GIVEN TO DATE: 39.92
RAD ONC ARIA REFERENCE POINT DOSAGE GIVEN TO DATE: 44
RAD ONC ARIA REFERENCE POINT ID: NORMAL
RAD ONC ARIA REFERENCE POINT ID: NORMAL
RAD ONC ARIA REFERENCE POINT SESSION DOSAGE GIVEN: 1.81
RAD ONC ARIA REFERENCE POINT SESSION DOSAGE GIVEN: 2

## 2021-09-16 PROCEDURE — 77014 PR CT GUIDANCE PLACEMENT RAD THERAPY FIELDS: CPT | Mod: 26 | Performed by: RADIOLOGY

## 2021-09-16 PROCEDURE — 77386 HCHG IMRT DELIVERY COMPLEX: CPT | Performed by: RADIOLOGY

## 2021-09-17 ENCOUNTER — HOSPITAL ENCOUNTER (OUTPATIENT)
Dept: RADIATION ONCOLOGY | Facility: MEDICAL CENTER | Age: 86
End: 2021-09-17

## 2021-09-17 ENCOUNTER — SPEECH THERAPY (OUTPATIENT)
Dept: SPEECH THERAPY | Facility: REHABILITATION | Age: 86
End: 2021-09-17
Attending: RADIOLOGY
Payer: MEDICARE

## 2021-09-17 DIAGNOSIS — C09.9 TONSIL CANCER (HCC): ICD-10-CM

## 2021-09-17 DIAGNOSIS — R13.10 DYSPHAGIA, UNSPECIFIED TYPE: ICD-10-CM

## 2021-09-17 LAB
CHEMOTHERAPY INFUSION START DATE: NORMAL
CHEMOTHERAPY RECORDS: 2
CHEMOTHERAPY RECORDS: 7000
CHEMOTHERAPY RECORDS: NORMAL
CHEMOTHERAPY RX CANCER: NORMAL
DATE 1ST CHEMO CANCER: NORMAL
RAD ONC ARIA COURSE LAST TREATMENT DATE: NORMAL
RAD ONC ARIA COURSE TREATMENT ELAPSED DAYS: NORMAL
RAD ONC ARIA REFERENCE POINT DOSAGE GIVEN TO DATE: 41.73
RAD ONC ARIA REFERENCE POINT DOSAGE GIVEN TO DATE: 46
RAD ONC ARIA REFERENCE POINT ID: NORMAL
RAD ONC ARIA REFERENCE POINT ID: NORMAL
RAD ONC ARIA REFERENCE POINT SESSION DOSAGE GIVEN: 1.81
RAD ONC ARIA REFERENCE POINT SESSION DOSAGE GIVEN: 2

## 2021-09-17 PROCEDURE — 77336 RADIATION PHYSICS CONSULT: CPT | Performed by: RADIOLOGY

## 2021-09-17 PROCEDURE — 77014 PR CT GUIDANCE PLACEMENT RAD THERAPY FIELDS: CPT | Mod: 26 | Performed by: RADIOLOGY

## 2021-09-17 PROCEDURE — 92526 ORAL FUNCTION THERAPY: CPT

## 2021-09-17 PROCEDURE — 77386 HCHG IMRT DELIVERY COMPLEX: CPT | Performed by: RADIOLOGY

## 2021-09-17 NOTE — OP THERAPY DAILY TREATMENT
"  Outpatient Speech Therapy  DAILY TREATMENT     [facility] Speech [address]  [address]  [place] [address]  Phone:  [phone]  Fax:  [phone]    Date: 09/17/2021    Patient: Ina Small  YOB: 1933  MRN: [id number]     Time Calculation    Start time: 1435  Stop time: 1500 Time Calculation (min): 25 minutes         Chief Complaint: Dysphagia (Odynophagia)    Visit #: 3    Subjective:   Reason for Therapy:     Reason For Evaluation:  Dysphagia (chemoradiotherapy/ 2 weeks and 2 days left)  Social Support:     Patient Mental Status:  Alert and Responsive  Progress Factors:     Progression:  Getting worse  Therapy History:     Current Diet:  Modified Diet, Pureed Foods and Thin Liquids (minced and moist)    Nutritional Concerns Restrictions and Allergies:  Patient endorses she can \"still taste sweet\" but is unable to taste any other textures at this time. Patient endorses intake of miracle berries to assist with changes to taste.   Additional Subjective Comments:      \"I'm not swallowing as well.\" Patient endorses that last night she ate some corn \"and it got stuck.\" Patient endorsed she was able to demonstrate expectoration necessary to clear, but also stated \"it was lucas scary for a minute.\" Patient endorsed no other concerns with regards to swallowing, stating she has modified diet to softer foods today without difficulty. Patient endorses changes to taste and saliva. Patient endorses no presence of throat clear or cough with swallowing.        Objective:   Treatments/Interventions Performed:  Patient/Caregiver education  Other Treatment Interventions:  Education completed on minced and moist textures  Objective Details:  Oral opening 5/5: jaw stretch, tongue ROM, coordination and strength     Pharyngeal phase 5/5: CTAR 10 second hold, 10 second rest x 5, Chen, Mendelsohn exercise with hold to 10 seconds, stretch exercise (including ear to shoulder, chin to " shoulder), Supraglottic swallow (breath hold- swallow twice), pitch glide /ee/ sustained to 11 seconds, Chen exercise, effortful swallow             Speech Therapy Assessment:     Oral Motor Status:     Labial strength and control for patient: Good    Lingual strength and control for patient: Good (slight change to tongue tip to molars; slightly reduced ROM of tongue upon protrusion)    Patient saliva management: GoodPatient oral sensation and awareness: Good    Patient awareness of swallow problem: Good    Oral Phase Assessment:     Types of food within functional limits: Thin Liquid    Pharyngeal Phase Assessment:     Delayed Swallow    Food types within functional limits: Thin Liquid      Speech Therapy Plan :   Prognosis & Recommendations  Impression Summary:  Patient seen for ongoing, direct outpatient speech therapy addressing swallow in the setting of tonsil cancer, s/p tonsillectomy with patient currently participating in radiotherapy to address.    Patient was encouraged in her current compliance to home program through completion of proactive swallow exercise addressing swallow. Patient demonstrated slight changes to tongue ROM only at this time.     Discussion regarding minced and moist textures completed with patient encouraged to including modification of textures to include smooth, slippery and slurry, textures at this time. Patient endorses she is already doing this, endorsing use of avocado in smoothie.    Prognosis:  Excellent  Prognosis Details:  Given patient motivation and compliance with home program addressing swallow, it is anticipated that patient will demonstrating maintenance of swallow function to promote return to previous level of swallow function following completion of radiotherapy.   Compensatory swallow strategies:  Upright position 90 degrees during meal and 45 minutes after meal, Alternate liquids/solids and Multiple swallows  Therapy Recommendations  Recommendation:  Individual  Speech Therapy,  Planned Therapy Interventions:  Home Program, Patient/Caregiver Education, Compensatory Strategy Training and Dysphagia treatment,   Plan Details:   Patient scheduled to return to outpatient speech therapy in 1 week. Patient encouraged to continue with proactive swallow exercise, oral, pharyngeal hygiene and trial minced and moist solids as necessary to promote intake

## 2021-09-20 ENCOUNTER — HOSPITAL ENCOUNTER (OUTPATIENT)
Dept: RADIATION ONCOLOGY | Facility: MEDICAL CENTER | Age: 86
End: 2021-09-20
Payer: MEDICARE

## 2021-09-20 LAB
CHEMOTHERAPY INFUSION START DATE: NORMAL
CHEMOTHERAPY RECORDS: 2
CHEMOTHERAPY RECORDS: 7000
CHEMOTHERAPY RECORDS: NORMAL
CHEMOTHERAPY RX CANCER: NORMAL
DATE 1ST CHEMO CANCER: NORMAL
RAD ONC ARIA COURSE LAST TREATMENT DATE: NORMAL
RAD ONC ARIA COURSE TREATMENT ELAPSED DAYS: NORMAL
RAD ONC ARIA REFERENCE POINT DOSAGE GIVEN TO DATE: 43.55
RAD ONC ARIA REFERENCE POINT DOSAGE GIVEN TO DATE: 48
RAD ONC ARIA REFERENCE POINT ID: NORMAL
RAD ONC ARIA REFERENCE POINT ID: NORMAL
RAD ONC ARIA REFERENCE POINT SESSION DOSAGE GIVEN: 1.81
RAD ONC ARIA REFERENCE POINT SESSION DOSAGE GIVEN: 2

## 2021-09-20 PROCEDURE — 77014 PR CT GUIDANCE PLACEMENT RAD THERAPY FIELDS: CPT | Mod: 26 | Performed by: RADIOLOGY

## 2021-09-20 PROCEDURE — 77386 HCHG IMRT DELIVERY COMPLEX: CPT | Performed by: RADIOLOGY

## 2021-09-21 ENCOUNTER — HOSPITAL ENCOUNTER (OUTPATIENT)
Dept: RADIATION ONCOLOGY | Facility: MEDICAL CENTER | Age: 86
End: 2021-09-21
Payer: MEDICARE

## 2021-09-21 ENCOUNTER — APPOINTMENT (OUTPATIENT)
Dept: SPEECH THERAPY | Facility: REHABILITATION | Age: 86
End: 2021-09-21
Attending: RADIOLOGY
Payer: MEDICARE

## 2021-09-21 LAB
CHEMOTHERAPY INFUSION START DATE: NORMAL
CHEMOTHERAPY RECORDS: 2
CHEMOTHERAPY RECORDS: 7000
CHEMOTHERAPY RECORDS: NORMAL
CHEMOTHERAPY RX CANCER: NORMAL
DATE 1ST CHEMO CANCER: NORMAL
RAD ONC ARIA COURSE LAST TREATMENT DATE: NORMAL
RAD ONC ARIA COURSE TREATMENT ELAPSED DAYS: NORMAL
RAD ONC ARIA REFERENCE POINT DOSAGE GIVEN TO DATE: 45.36
RAD ONC ARIA REFERENCE POINT DOSAGE GIVEN TO DATE: 50
RAD ONC ARIA REFERENCE POINT ID: NORMAL
RAD ONC ARIA REFERENCE POINT ID: NORMAL
RAD ONC ARIA REFERENCE POINT SESSION DOSAGE GIVEN: 1.81
RAD ONC ARIA REFERENCE POINT SESSION DOSAGE GIVEN: 2

## 2021-09-21 PROCEDURE — 77386 HCHG IMRT DELIVERY COMPLEX: CPT | Performed by: RADIOLOGY

## 2021-09-21 PROCEDURE — 77014 PR CT GUIDANCE PLACEMENT RAD THERAPY FIELDS: CPT | Mod: 26 | Performed by: RADIOLOGY

## 2021-09-21 PROCEDURE — 77427 RADIATION TX MANAGEMENT X5: CPT | Performed by: RADIOLOGY

## 2021-09-22 ENCOUNTER — DOCUMENTATION (OUTPATIENT)
Dept: RADIATION ONCOLOGY | Facility: MEDICAL CENTER | Age: 86
End: 2021-09-22

## 2021-09-22 ENCOUNTER — HOSPITAL ENCOUNTER (OUTPATIENT)
Dept: RADIATION ONCOLOGY | Facility: MEDICAL CENTER | Age: 86
End: 2021-09-22
Payer: MEDICARE

## 2021-09-22 VITALS
BODY MASS INDEX: 24.71 KG/M2 | HEART RATE: 75 BPM | DIASTOLIC BLOOD PRESSURE: 75 MMHG | OXYGEN SATURATION: 95 % | WEIGHT: 153 LBS | SYSTOLIC BLOOD PRESSURE: 148 MMHG

## 2021-09-22 DIAGNOSIS — C09.9 TONSIL CANCER (HCC): ICD-10-CM

## 2021-09-22 LAB
CHEMOTHERAPY INFUSION START DATE: NORMAL
CHEMOTHERAPY RECORDS: 2
CHEMOTHERAPY RECORDS: 7000
CHEMOTHERAPY RECORDS: NORMAL
CHEMOTHERAPY RX CANCER: NORMAL
DATE 1ST CHEMO CANCER: NORMAL
RAD ONC ARIA COURSE LAST TREATMENT DATE: NORMAL
RAD ONC ARIA COURSE TREATMENT ELAPSED DAYS: NORMAL
RAD ONC ARIA REFERENCE POINT DOSAGE GIVEN TO DATE: 47.18
RAD ONC ARIA REFERENCE POINT DOSAGE GIVEN TO DATE: 52
RAD ONC ARIA REFERENCE POINT ID: NORMAL
RAD ONC ARIA REFERENCE POINT ID: NORMAL
RAD ONC ARIA REFERENCE POINT SESSION DOSAGE GIVEN: 1.81
RAD ONC ARIA REFERENCE POINT SESSION DOSAGE GIVEN: 2

## 2021-09-22 PROCEDURE — 77014 PR CT GUIDANCE PLACEMENT RAD THERAPY FIELDS: CPT | Mod: 26 | Performed by: RADIOLOGY

## 2021-09-22 PROCEDURE — 77386 HCHG IMRT DELIVERY COMPLEX: CPT | Performed by: RADIOLOGY

## 2021-09-22 RX ORDER — LIDOCAINE HYDROCHLORIDE 20 MG/ML
15 SOLUTION OROPHARYNGEAL PRN
Qty: 1200 ML | Refills: 3 | Status: SHIPPED | OUTPATIENT
Start: 2021-09-22 | End: 2021-10-22

## 2021-09-22 ASSESSMENT — PAIN SCALES - GENERAL: PAINLEVEL: 5=MODERATE PAIN

## 2021-09-22 NOTE — ON TREATMENT VISIT
ON TREATMENT  NOTE  RADIATION ONCOLOGY DEPARTMENT    Patient name:  Ina Small    Primary Physician:  HENRRY Earl MRN: [id number]  CSN: [id number]   Referring physician:  Bigg Ren M.D. : 10/28/1933, 87 y.o.     ENCOUNTER DATE:  21    DIAGNOSIS:  Visit Diagnoses     ICD-10-CM   1. Tonsil cancer (HCC)  C09.9     Tonsil cancer (HCC)  Staging form: Pharynx - HPV-Mediated Oropharynx, AJCC 8th Edition  - Clinical stage from 2021: Stage I (cT1, cN1, cM0, p16+) - Signed by Bailee CASTANEDA M.D. on 2021  Histopathologic type: Squamous cell carcinoma, NOS  Stage prefix: Initial diagnosis      TREATMENT SUMMARY:  [facility] Treatment Information        Some values may be hidden. Unless noted otherwise, only the newest values recorded on each date are displayed.         [facility] Treatment Summary 21   Course First Treatment Date 2021   Course Last Treatment Date 2021   L Tonsil [HN] Plan from Course [id number]   Fraction 26 of 35   Elapsed Course Days 36 @ [id number]   Prescribed Fraction Dose 200 cGy   Prescribed Total Dose 7,000 cGy   HN cp Reference Point from Course [id number]   Elapsed Course Days 36 @ [id number]   Session Dose 181 cGy   Total Dose 4,718 cGy   PTV70 Reference Point from Course [id number]   Elapsed Course Days 36 @ [id number]   Session Dose 200 cGy   Total Dose 5,200 cGy             SUBJECTIVE:  Increasing pain, using hycet      VITAL SIGNS:  KPS: 100, Fully active, able to carry on all pre-disease performed without restriction (ECOG equivalent 0)  Encounter Vitals  Blood Pressure : 148/75  Pulse: 75  Pulse Oximetry: 95 %  Weight: 69.4 kg (153 lb)  Pain Score: 5=Moderate Pain  Pain Assessment 2021 9/15/2021 2021   Pain Score 5 1 2   Pain Loc Mouth Mouth Throat   Some recent data might be hidden          PHYSICAL EXAM:    Physical Exam  HENT:      Mouth/Throat:      Pharynx: Posterior oropharyngeal erythema present.    Skin:     Findings: Erythema present.          Toxicity Assessment 9/22/2021 9/15/2021 9/8/2021 9/1/2021 8/25/2021 8/18/2021   Toxicity Assessment Head/Neck Head/Neck Head/Neck Head/Neck Head/Neck Head/Neck   Fatigue (lethargy, malaise, asthenia) Increased fatigue over baseline, but not altering normal activities Increased fatigue over baseline, but not altering normal activities Increased fatigue over baseline, but not altering normal activities Increased fatigue over baseline, but not altering normal activities None None   Radiation Dermatitis Faint erythema or dry desquamation Faint erythema or dry desquamation None None None None   Rash/desquamation - None None None None None   Constipation None None None None None None   Dehydration Dry mucous membranes and/or diminished skin turgor Dry mucous membranes and/or diminished skin turgor Dry mucous membranes and/or diminished skin turgor None None None   Mouth Dryness Mild Mild Mild Normal Normal Normal   RT Dysphagia-Pharyngeal Dysphagia, requiring predominantly pureed, soft, or liquid diet Mild dysphagia, but can eat regular diet Mild dysphagia, but can eat regular diet Mild dysphagia, but can eat regular diet Mild dysphagia, but can eat regular diet None   Mucositis Erythema of mucosa Erythema of mucosa Erythema of mucosa None Erythema of mucosa None   Salivary Gland Changes Sticky thickened saliva, may have slightly altered taste (e.g. metallic), additional fluids may be required Sticky thickened saliva, may have slightly altered taste (e.g. metallic), additional fluids may be required Sticky thickened saliva, may have slightly altered taste (e.g. metallic), additional fluids may be required Sticky thickened saliva, may have slightly altered taste (e.g. metallic), additional fluids may be required Sticky thickened saliva, may have slightly altered taste (e.g. metallic), additional fluids may be required None   Stomatitis/Pharyngitis - None - - - None   Taste  Disturbance (dysgeusia) Markedly altered Markedly altered Markedly altered Slightly altered Slightly altered Normal   RT - Pain due to RT Moderate pain, pain or analgesics interfering with function, but not interfering with activities of daily living Mild pain not interfering with function Mild pain not interfering with function Mild pain not interfering with function None None   Cough Absent Absent Absent Absent Absent Absent   Voice changes/stridor/larynx (hoarseness, loss of voice, laryngitis) Normal Normal Normal Mild or intermittent hoarseness Normal Normal       CURRENT MEDICATIONS:    Current Outpatient Medications:   •  HYDROcodone-acetaminophen 2.5-108 mg/5mL (HYCET) 7.5-325 MG/15ML solution, Take 15 mL by mouth 4 times a day as needed for Moderate Pain for up to 30 days., Disp: 473 mL, Rfl: 0  •  lidocaine (XYLOCAINE) 2 % Solution, Take 15 mL by mouth as needed for Throat/Mouth Pain for up to 30 days., Disp: 1200 mL, Rfl: 3  •  Ibuprofen (ADVIL) 200 MG Cap, Take  by mouth., Disp: , Rfl:   •  ALPRAZolam (XANAX) 0.25 MG Tab, Take 0.25 mg by mouth at bedtime as needed for Sleep., Disp: , Rfl:   •  chlorhexidine (PERIDEX) 0.12 % Solution, Take 15 mL by mouth as needed., Disp: , Rfl:   •  fluticasone (FLONASE) 50 MCG/ACT nasal spray, Spray 1-2 Sprays in nose 1 time daily as needed (for allergies)., Disp: , Rfl:   •  fexofenadine (ALLEGRA ALLERGY) 180 MG tablet, Take 180 mg by mouth every morning., Disp: , Rfl:   •  hydroCHLOROthiazide (HYDRODIURIL) 25 MG Tab, Take 25 mg by mouth every morning., Disp: , Rfl:   •  gabapentin (NEURONTIN) 300 MG CAPS, Take 600 mg by mouth 2 times a day., Disp: , Rfl:     LABORATORY DATA:   Lab Results   Component Value Date/Time    SODIUM 132 (L) 07/01/2021 01:45 PM    POTASSIUM 3.8 07/01/2021 01:45 PM    CHLORIDE 93 (L) 07/01/2021 01:45 PM    CO2 28 07/01/2021 01:45 PM    GLUCOSE 112 (H) 07/01/2021 01:45 PM    BUN 14 07/01/2021 01:45 PM    CREATININE 0.97 07/01/2021 01:45 PM          Lab Results   Component Value Date/Time    WBC 7.0 07/01/2021 01:45 PM    HEMOGLOBIN 15.4 07/01/2021 01:45 PM    HEMATOCRIT 46.9 07/01/2021 01:45 PM    MCV 87.7 07/01/2021 01:45 PM    PLATELETCT 256 07/01/2021 01:45 PM        RADIOLOGY DATA:  No results found.    IMPRESSION:  Tonsil cancer (HCC)  Staging form: Pharynx - HPV-Mediated Oropharynx, AJCC 8th Edition  - Clinical stage from 7/30/2021: Stage I (cT1, cN1, cM0, p16+) - Signed by Bailee CASTANEDA M.D. on 7/30/2021  Histopathologic type: Squamous cell carcinoma, NOS  Stage prefix: Initial diagnosis      PLAN:  No change in treatment plan    Disposition:  Treatment plan reviewed. Questions answered. Continue therapy outlined.   Refill for hycet and viscous lidocaine given.    Srikanth Perez M.D.    Orders Placed This Encounter   • HYDROcodone-acetaminophen 2.5-108 mg/5mL (HYCET) 7.5-325 MG/15ML solution   • lidocaine (XYLOCAINE) 2 % Solution

## 2021-09-22 NOTE — PROGRESS NOTES
Nutrition Services: Brief Update  Wt Readings from Last 7 Encounters:   21 69.4 kg (153 lb)   09/15/21 68.9 kg (152 lb)   21 69.3 kg (152 lb 14.2 oz)   21 70 kg (154 lb 3.4 oz)   21 69.4 kg (153 lb)   21 69.4 kg (153 lb 1.8 oz)   21 69.4 kg (153 lb)     Weight Change: wt remaining desirably stable at this time    Estimated nutrition needs:  Calories: 25-30 kcal/k - 2079 kcal/day (lower kcal needs suitable given no degree of weight loss at this time)  Protein: 1.2-1.5g/k - 103 grams per day. (Lower protein needs likely sufficient given advanced age)    RD able to visit pt and daughter, Yusra, following OTV. Pt reports throat and mouth are burning now and intake is getting more difficult. States is unable to tolerate protein powder any longer as it is too grainy. Yusra requests help with ideas for meeting calorie and protein needs as intake is becoming more challenging with pain. Pt states is unable to eat chicken from chicken and rice soup now. Yusra asks if foods should be blended now to make it easier. Pt states miracle berry does help enough with taste to encourage some intake. Yusra and pt visibly overwhelmed. Asks about protein and calorie recommendations and what to focus on.     Plan/Recommend:  • RD reviewed low acid fruits to include in smoothie, such as canned peaches and pears, banana, melon, and cantaloupe.   • RD discussed would assist by putting together a meal plan that includes calorie and protein goals. Reviewed protein and calorie goals and ways to achieve trhough foods is currently tolerating  • Provided samples of Boost pudding, beneprotein, benecalorie, [id number], and Boost VHC to trial in incorporate into diet as tolerated.     Pt verbalizes understanding and is receptive to information provided. RD to continue to monitor throughout treatment and provide nutrition recommendations as indicated.  Please contact [phone]

## 2021-09-23 ENCOUNTER — HOSPITAL ENCOUNTER (OUTPATIENT)
Dept: RADIATION ONCOLOGY | Facility: MEDICAL CENTER | Age: 86
End: 2021-09-23
Payer: MEDICARE

## 2021-09-23 LAB
CHEMOTHERAPY INFUSION START DATE: NORMAL
CHEMOTHERAPY RECORDS: 2
CHEMOTHERAPY RECORDS: 7000
CHEMOTHERAPY RECORDS: NORMAL
CHEMOTHERAPY RX CANCER: NORMAL
DATE 1ST CHEMO CANCER: NORMAL
RAD ONC ARIA COURSE LAST TREATMENT DATE: NORMAL
RAD ONC ARIA COURSE TREATMENT ELAPSED DAYS: NORMAL
RAD ONC ARIA REFERENCE POINT DOSAGE GIVEN TO DATE: 48.99
RAD ONC ARIA REFERENCE POINT DOSAGE GIVEN TO DATE: 54
RAD ONC ARIA REFERENCE POINT ID: NORMAL
RAD ONC ARIA REFERENCE POINT ID: NORMAL
RAD ONC ARIA REFERENCE POINT SESSION DOSAGE GIVEN: 1.81
RAD ONC ARIA REFERENCE POINT SESSION DOSAGE GIVEN: 2

## 2021-09-23 PROCEDURE — 77386 HCHG IMRT DELIVERY COMPLEX: CPT | Performed by: RADIOLOGY

## 2021-09-23 PROCEDURE — 77014 PR CT GUIDANCE PLACEMENT RAD THERAPY FIELDS: CPT | Mod: 26 | Performed by: RADIOLOGY

## 2021-09-24 ENCOUNTER — HOSPITAL ENCOUNTER (OUTPATIENT)
Dept: RADIATION ONCOLOGY | Facility: MEDICAL CENTER | Age: 86
End: 2021-09-24
Payer: MEDICARE

## 2021-09-24 LAB
CHEMOTHERAPY INFUSION START DATE: NORMAL
CHEMOTHERAPY RECORDS: 2
CHEMOTHERAPY RECORDS: 7000
CHEMOTHERAPY RECORDS: NORMAL
CHEMOTHERAPY RX CANCER: NORMAL
DATE 1ST CHEMO CANCER: NORMAL
RAD ONC ARIA COURSE LAST TREATMENT DATE: NORMAL
RAD ONC ARIA COURSE TREATMENT ELAPSED DAYS: NORMAL
RAD ONC ARIA REFERENCE POINT DOSAGE GIVEN TO DATE: 50.8
RAD ONC ARIA REFERENCE POINT DOSAGE GIVEN TO DATE: 56
RAD ONC ARIA REFERENCE POINT ID: NORMAL
RAD ONC ARIA REFERENCE POINT ID: NORMAL
RAD ONC ARIA REFERENCE POINT SESSION DOSAGE GIVEN: 1.81
RAD ONC ARIA REFERENCE POINT SESSION DOSAGE GIVEN: 2

## 2021-09-24 PROCEDURE — 77336 RADIATION PHYSICS CONSULT: CPT | Performed by: RADIOLOGY

## 2021-09-24 PROCEDURE — 77386 HCHG IMRT DELIVERY COMPLEX: CPT | Performed by: RADIOLOGY

## 2021-09-24 PROCEDURE — 77014 PR CT GUIDANCE PLACEMENT RAD THERAPY FIELDS: CPT | Mod: 26 | Performed by: RADIOLOGY

## 2021-09-27 ENCOUNTER — SPEECH THERAPY (OUTPATIENT)
Dept: SPEECH THERAPY | Facility: REHABILITATION | Age: 86
End: 2021-09-27
Attending: RADIOLOGY
Payer: MEDICARE

## 2021-09-27 ENCOUNTER — HOSPITAL ENCOUNTER (OUTPATIENT)
Dept: RADIATION ONCOLOGY | Facility: MEDICAL CENTER | Age: 86
End: 2021-09-27

## 2021-09-27 DIAGNOSIS — R13.10 DYSPHAGIA, UNSPECIFIED TYPE: ICD-10-CM

## 2021-09-27 DIAGNOSIS — C09.9 TONSIL CANCER (HCC): ICD-10-CM

## 2021-09-27 LAB
CHEMOTHERAPY INFUSION START DATE: NORMAL
CHEMOTHERAPY RECORDS: 2
CHEMOTHERAPY RECORDS: 7000
CHEMOTHERAPY RECORDS: NORMAL
CHEMOTHERAPY RX CANCER: NORMAL
DATE 1ST CHEMO CANCER: NORMAL
RAD ONC ARIA COURSE LAST TREATMENT DATE: NORMAL
RAD ONC ARIA COURSE TREATMENT ELAPSED DAYS: NORMAL
RAD ONC ARIA REFERENCE POINT DOSAGE GIVEN TO DATE: 52.62
RAD ONC ARIA REFERENCE POINT DOSAGE GIVEN TO DATE: 58
RAD ONC ARIA REFERENCE POINT ID: NORMAL
RAD ONC ARIA REFERENCE POINT ID: NORMAL
RAD ONC ARIA REFERENCE POINT SESSION DOSAGE GIVEN: 1.81
RAD ONC ARIA REFERENCE POINT SESSION DOSAGE GIVEN: 2

## 2021-09-27 PROCEDURE — 77014 PR CT GUIDANCE PLACEMENT RAD THERAPY FIELDS: CPT | Mod: 26 | Performed by: RADIOLOGY

## 2021-09-27 PROCEDURE — 77386 HCHG IMRT DELIVERY COMPLEX: CPT | Performed by: RADIOLOGY

## 2021-09-27 PROCEDURE — 92526 ORAL FUNCTION THERAPY: CPT

## 2021-09-27 ASSESSMENT — SOCIAL DETERMINANTS OF HEALTH (SDOH): SOCIAL_SUPPORT_SYSTEM: CHILDREN

## 2021-09-27 ASSESSMENT — ENCOUNTER SYMPTOMS: PAIN SCALE: 3

## 2021-09-27 NOTE — OP THERAPY DAILY TREATMENT
"  Outpatient Speech Therapy  DAILY TREATMENT     [facility] [address].  [address]  [place] [address]  Phone:  [phone]  Fax:  [phone]    Date: 09/27/2021    Patient: Ina Small  YOB: 1933  MRN: [id number]     Time Calculation    Start time: 1430  Stop time: 1455 Time Calculation (min): 25 minutes         Chief Complaint: Dysphagia    Visit #: 4    Subjective:   Social Support:     Accompanied By:  Children (daughter, present and supportive)    Patient Mental Status:  Alert and Responsive  Pain:     Current pain rating:  3  Therapy History:     Previous Diet:  Normal Diet, Normal Consistency and Thin Liquids    Current Diet:  Pureed Foods and Thin Liquids    Nutritional Concerns Restrictions and Allergies:  Patient endorses she continues to maintain her nutrition, hydration needs through PO intake, even reporting a slight weight gain \"I gained a pound last week.\"   Additional Subjective Comments:      Patient endorses she has downgraded her textures most consistent with pureed solids at this time due to increased reports of odynophagia (pain with swallowing) and changes to saliva reported at \"thick.\"       Objective:   Treatments/Interventions Performed:  Patient/Caregiver education, Compensatory strategy training, Home program and Dysphagia treatment  Treatment Intervention tool(s) used:  Proactive swallow exercise to continue as part of home program reviewed, practice with focus on oral opening and tongue range of motion  Objective Details:   Oral opening reviewed to: jaw stretch, tongue ROM, coordination and strength     Pharyngeal phase reviewed to : CTAR (5 second hold) recommended to continue to 10 second hold, Mendelsohn exercise with hold to 10 seconds, stretch exercise (including ear to shoulder, chin to shoulder), Supraglottic swallow (breath hold- swallow), effortful swallow          Speech Therapy Assessment:     Oral Motor Status:     Oral motor " "status comments: Patient with noted changes to tongue range of motion, specific to increased difficulty in movement of tongue to right side of oral cavity, right labial corner in the setting of increased pain. Patient encouraged in completion of exercise addressing range of motion to support oral phase of swallow. During swallow exercise, patient endorsed changes to approximation of tongue to palate \"its harder to lift my tongue up now than it was before.\" Given this, mendelsohn exercise completed with patient able to maintain lift/ hold to 10 seconds.     Oral Phase Assessment:     Types of food within functional limits: Thin Liquid    Pharyngeal Phase Assessment:     Delayed Swallow    Food types within functional limits: Thin Liquid    Pharyngeal phase comments: With small, single sips and use of effortful swallow patient demonstrated no clinical signs of aspiration, consistent with reports of no difficulty in swallow at this time.       Speech Therapy Plan :   Prognosis & Recommendations  Impression Summary:  Patient encouraged in current diet texture intake with patient also encouraged to modify diet as necessary to support ease of swallow specific to recommendations of thin liquids, smooth, slippery and slurry textures to promote intake in the onset of odynophagia. Patient encouraged in exercise addressing tongue range of motion in the setting of slight changes demonstrated during oral motor assessment.   Therapy Recommendations  Recommendation:  Individual Speech Therapy,  Planned Therapy Interventions:  Home Program, Patient/Caregiver Education, Compensatory Strategy Training and Dysphagia treatment,   Plan Details:  Continue with current POC. Patient endorses she has 7 treatments of RAD remaining. It is recommended patient return for reassessment of swallow approximately 2 weeks following the completion of RAD.                "

## 2021-09-28 ENCOUNTER — HOSPITAL ENCOUNTER (OUTPATIENT)
Dept: RADIATION ONCOLOGY | Facility: MEDICAL CENTER | Age: 86
End: 2021-09-28
Payer: MEDICARE

## 2021-09-28 LAB
CHEMOTHERAPY INFUSION START DATE: NORMAL
CHEMOTHERAPY RECORDS: 2
CHEMOTHERAPY RECORDS: 7000
CHEMOTHERAPY RECORDS: NORMAL
CHEMOTHERAPY RX CANCER: NORMAL
DATE 1ST CHEMO CANCER: NORMAL
RAD ONC ARIA COURSE LAST TREATMENT DATE: NORMAL
RAD ONC ARIA COURSE TREATMENT ELAPSED DAYS: NORMAL
RAD ONC ARIA REFERENCE POINT DOSAGE GIVEN TO DATE: 54.43
RAD ONC ARIA REFERENCE POINT DOSAGE GIVEN TO DATE: 60
RAD ONC ARIA REFERENCE POINT ID: NORMAL
RAD ONC ARIA REFERENCE POINT ID: NORMAL
RAD ONC ARIA REFERENCE POINT SESSION DOSAGE GIVEN: 1.81
RAD ONC ARIA REFERENCE POINT SESSION DOSAGE GIVEN: 2

## 2021-09-28 PROCEDURE — 77427 RADIATION TX MANAGEMENT X5: CPT | Performed by: RADIOLOGY

## 2021-09-28 PROCEDURE — 77386 HCHG IMRT DELIVERY COMPLEX: CPT | Performed by: RADIOLOGY

## 2021-09-28 PROCEDURE — 77014 PR CT GUIDANCE PLACEMENT RAD THERAPY FIELDS: CPT | Mod: 26 | Performed by: RADIOLOGY

## 2021-09-29 ENCOUNTER — HOSPITAL ENCOUNTER (OUTPATIENT)
Dept: RADIATION ONCOLOGY | Facility: MEDICAL CENTER | Age: 86
End: 2021-09-29
Payer: MEDICARE

## 2021-09-29 VITALS
SYSTOLIC BLOOD PRESSURE: 143 MMHG | BODY MASS INDEX: 24.6 KG/M2 | DIASTOLIC BLOOD PRESSURE: 71 MMHG | WEIGHT: 152.34 LBS | OXYGEN SATURATION: 92 % | HEART RATE: 91 BPM

## 2021-09-29 LAB
CHEMOTHERAPY INFUSION START DATE: NORMAL
CHEMOTHERAPY RECORDS: 2
CHEMOTHERAPY RECORDS: 7000
CHEMOTHERAPY RECORDS: NORMAL
CHEMOTHERAPY RX CANCER: NORMAL
DATE 1ST CHEMO CANCER: NORMAL
RAD ONC ARIA COURSE LAST TREATMENT DATE: NORMAL
RAD ONC ARIA COURSE TREATMENT ELAPSED DAYS: NORMAL
RAD ONC ARIA REFERENCE POINT DOSAGE GIVEN TO DATE: 56.25
RAD ONC ARIA REFERENCE POINT DOSAGE GIVEN TO DATE: 62
RAD ONC ARIA REFERENCE POINT ID: NORMAL
RAD ONC ARIA REFERENCE POINT ID: NORMAL
RAD ONC ARIA REFERENCE POINT SESSION DOSAGE GIVEN: 1.81
RAD ONC ARIA REFERENCE POINT SESSION DOSAGE GIVEN: 2

## 2021-09-29 PROCEDURE — 77014 PR CT GUIDANCE PLACEMENT RAD THERAPY FIELDS: CPT | Mod: 26 | Performed by: RADIOLOGY

## 2021-09-29 PROCEDURE — 77386 HCHG IMRT DELIVERY COMPLEX: CPT | Performed by: RADIOLOGY

## 2021-09-29 ASSESSMENT — PAIN SCALES - GENERAL: PAINLEVEL: 2=MINIMAL-SLIGHT

## 2021-09-29 NOTE — ON TREATMENT VISIT
"  ON TREATMENT  NOTE  RADIATION ONCOLOGY DEPARTMENT    Patient name:  Ina Small    Primary Physician:  HENRRY Earl MRN: [id number]  CSN: [id number]   Referring physician:  Bigg Ren M.D. : 10/28/1933, 87 y.o.     ENCOUNTER DATE:  2021      DIAGNOSIS:  Tonsil cancer (HCC)  Staging form: Pharynx - HPV-Mediated Oropharynx, AJCC 8th Edition  - Clinical stage from 2021: Stage I (cT1, cN1, cM0, p16+) - Signed by Bailee CASTANEDA M.D. on 2021  Histopathologic type: Squamous cell carcinoma, NOS  Stage prefix: Initial diagnosis      TREATMENT SUMMARY:  [facility] Treatment Information        Some values may be hidden. Unless noted otherwise, only the newest values recorded on each date are displayed.         [facility] Treatment Summary 21   Course First Treatment Date 2021   Course Last Treatment Date 2021   L Tonsil [HN] Plan from Course [id number]   Fraction 31 of 35   Elapsed Course Days 43 @ [id number]   Prescribed Fraction Dose 200 cGy   Prescribed Total Dose 7,000 cGy   HN cp Reference Point from Course [id number]   Elapsed Course Days 43 @ [id number]   Session Dose 181 cGy   Total Dose 5,625 cGy   PTV70 Reference Point from Course [id number]   Elapsed Course Days 43 @ [id number]   Session Dose 200 cGy   Total Dose 6,200 cGy                SUBJECTIVE:  No complaints.      VITAL SIGNS:  Vitals 2021 2021 9/15/2021 2021 2021 2021 2021   SYSTOLIC 143 148 145 147 156 144 130   DIASTOLIC 71 75 80 68 81 77 82   PULSE 91 75 71 73 67 83 94   TEMPERATURE - - 98.4 - - 97.2 98   RESPIRATIONS - - - - - - 16   WEIGHT 152.34 153 152 152.89 154.21 153.11 153   HEIGHT - - - - - - 5' 5.984\"   BMI 24.6 kg/m2 24.71 kg/m2 24.55 kg/m2 24.69 kg/m2 24.9 kg/m2 24.72 kg/m2 24.71 kg/m2   SPO2 92 95 92 96 90 95 94     KPS: 70, Cares for self; unable to carry on normal activity or to do active work (ECOG equivalent 1)  Encounter Vitals  Blood " Pressure : 143/71  Pulse: 91  Pulse Oximetry: 92 %  Weight: 69.1 kg (152 lb 5.4 oz)  Pain Score: 2=Minimal-Slight  Pain Assessment 9/29/2021 9/22/2021 9/15/2021   Pain Score 2 5 1   Pain Loc Throat Mouth Mouth   Some recent data might be hidden          PHYSICAL EXAM:  Physical Exam  Vitals and nursing note reviewed.   Constitutional:       Appearance: She is well-developed.   HENT:      Head: Normocephalic.      Mouth/Throat:      Pharynx: Posterior oropharyngeal erythema (left tonsil) present.   Skin:     General: Skin is warm and dry.      Findings: No erythema.   Neurological:      Mental Status: She is alert and oriented to person, place, and time.   Psychiatric:         Behavior: Behavior normal.         Thought Content: Thought content normal.         Judgment: Judgment normal.          Toxicity Assessment 9/29/2021 9/22/2021 9/15/2021 9/8/2021 9/1/2021 8/25/2021 8/18/2021   Toxicity Assessment Head/Neck Head/Neck Head/Neck Head/Neck Head/Neck Head/Neck Head/Neck   Fatigue (lethargy, malaise, asthenia) Moderate (e.g., decrease in performance status by 1 ECOG level or 20% Karnofsky) or causing difficulty performing some activities Increased fatigue over baseline, but not altering normal activities Increased fatigue over baseline, but not altering normal activities Increased fatigue over baseline, but not altering normal activities Increased fatigue over baseline, but not altering normal activities None None   Radiation Dermatitis Faint erythema or dry desquamation Faint erythema or dry desquamation Faint erythema or dry desquamation None None None None   Rash/desquamation None - None None None None None   Constipation Requiring stool softener or dietary modification None None None None None None   Dehydration None Dry mucous membranes and/or diminished skin turgor Dry mucous membranes and/or diminished skin turgor Dry mucous membranes and/or diminished skin turgor None None None   Mouth Dryness Moderate Mild  Mild Mild Normal Normal Normal   RT Dysphagia-Pharyngeal Dysphagia, requiring predominantly pureed, soft, or liquid diet Dysphagia, requiring predominantly pureed, soft, or liquid diet Mild dysphagia, but can eat regular diet Mild dysphagia, but can eat regular diet Mild dysphagia, but can eat regular diet Mild dysphagia, but can eat regular diet None   Mucositis None Erythema of mucosa Erythema of mucosa Erythema of mucosa None Erythema of mucosa None   Salivary Gland Changes Sticky thickened saliva, may have slightly altered taste (e.g. metallic), additional fluids may be required Sticky thickened saliva, may have slightly altered taste (e.g. metallic), additional fluids may be required Sticky thickened saliva, may have slightly altered taste (e.g. metallic), additional fluids may be required Sticky thickened saliva, may have slightly altered taste (e.g. metallic), additional fluids may be required Sticky thickened saliva, may have slightly altered taste (e.g. metallic), additional fluids may be required Sticky thickened saliva, may have slightly altered taste (e.g. metallic), additional fluids may be required None   Stomatitis/Pharyngitis - - None - - - None   Taste Disturbance (dysgeusia) Markedly altered Markedly altered Markedly altered Markedly altered Slightly altered Slightly altered Normal   RT - Pain due to RT Mild pain not interfering with function Moderate pain, pain or analgesics interfering with function, but not interfering with activities of daily living Mild pain not interfering with function Mild pain not interfering with function Mild pain not interfering with function None None   Cough Absent Absent Absent Absent Absent Absent Absent   Voice changes/stridor/larynx (hoarseness, loss of voice, laryngitis) Mild or intermittent hoarseness Normal Normal Normal Mild or intermittent hoarseness Normal Normal       CURRENT MEDICATIONS:    Current Outpatient Medications:   •  HYDROcodone-acetaminophen  2.5-108 mg/5mL (HYCET) 7.5-325 MG/15ML solution, Take 15 mL by mouth 4 times a day as needed for Moderate Pain for up to 30 days., Disp: 473 mL, Rfl: 0  •  lidocaine (XYLOCAINE) 2 % Solution, Take 15 mL by mouth as needed for Throat/Mouth Pain for up to 30 days., Disp: 1200 mL, Rfl: 3  •  Ibuprofen (ADVIL) 200 MG Cap, Take  by mouth., Disp: , Rfl:   •  ALPRAZolam (XANAX) 0.25 MG Tab, Take 0.25 mg by mouth at bedtime as needed for Sleep., Disp: , Rfl:   •  chlorhexidine (PERIDEX) 0.12 % Solution, Take 15 mL by mouth as needed., Disp: , Rfl:   •  fluticasone (FLONASE) 50 MCG/ACT nasal spray, Spray 1-2 Sprays in nose 1 time daily as needed (for allergies)., Disp: , Rfl:   •  fexofenadine (ALLEGRA ALLERGY) 180 MG tablet, Take 180 mg by mouth every morning., Disp: , Rfl:   •  hydroCHLOROthiazide (HYDRODIURIL) 25 MG Tab, Take 25 mg by mouth every morning., Disp: , Rfl:   •  gabapentin (NEURONTIN) 300 MG CAPS, Take 600 mg by mouth 2 times a day., Disp: , Rfl:     LABORATORY DATA:   Lab Results   Component Value Date/Time    SODIUM 132 (L) 07/01/2021 01:45 PM    POTASSIUM 3.8 07/01/2021 01:45 PM    CHLORIDE 93 (L) 07/01/2021 01:45 PM    CO2 28 07/01/2021 01:45 PM    GLUCOSE 112 (H) 07/01/2021 01:45 PM    BUN 14 07/01/2021 01:45 PM    CREATININE 0.97 07/01/2021 01:45 PM       Lab Results   Component Value Date/Time    WBC 7.0 07/01/2021 01:45 PM    RBC 5.35 07/01/2021 01:45 PM    HEMOGLOBIN 15.4 07/01/2021 01:45 PM    HEMATOCRIT 46.9 07/01/2021 01:45 PM    MCV 87.7 07/01/2021 01:45 PM    MCH 28.8 07/01/2021 01:45 PM    MCHC 32.8 (L) 07/01/2021 01:45 PM    PLATELETCT 256 07/01/2021 01:45 PM         RADIOLOGY DATA:  No results found.    IMPRESSION:  Cancer Staging  Tonsil cancer (HCC)  Staging form: Pharynx - HPV-Mediated Oropharynx, AJCC 8th Edition  - Clinical stage from 7/30/2021: Stage I (cT1, cN1, cM0, p16+) - Signed by Bailee CASTANEDA M.D. on 7/30/2021      PLAN:  No change in treatment plan   RTC 6  weeks    Disposition:  Treatment plan and imaging reviewed. Questions answered. Continue therapy outlined.     Bailee CASTANEDA M.D.    No orders of the defined types were placed in this encounter.

## 2021-09-30 ENCOUNTER — HOSPITAL ENCOUNTER (OUTPATIENT)
Dept: RADIATION ONCOLOGY | Facility: MEDICAL CENTER | Age: 86
End: 2021-09-30
Payer: MEDICARE

## 2021-09-30 LAB
CHEMOTHERAPY INFUSION START DATE: NORMAL
CHEMOTHERAPY RECORDS: 2
CHEMOTHERAPY RECORDS: 7000
CHEMOTHERAPY RECORDS: NORMAL
CHEMOTHERAPY RX CANCER: NORMAL
DATE 1ST CHEMO CANCER: NORMAL
RAD ONC ARIA COURSE LAST TREATMENT DATE: NORMAL
RAD ONC ARIA COURSE TREATMENT ELAPSED DAYS: NORMAL
RAD ONC ARIA REFERENCE POINT DOSAGE GIVEN TO DATE: 58.06
RAD ONC ARIA REFERENCE POINT DOSAGE GIVEN TO DATE: 64
RAD ONC ARIA REFERENCE POINT ID: NORMAL
RAD ONC ARIA REFERENCE POINT ID: NORMAL
RAD ONC ARIA REFERENCE POINT SESSION DOSAGE GIVEN: 1.81
RAD ONC ARIA REFERENCE POINT SESSION DOSAGE GIVEN: 2

## 2021-09-30 PROCEDURE — 77386 HCHG IMRT DELIVERY COMPLEX: CPT | Performed by: RADIOLOGY

## 2021-09-30 PROCEDURE — 77014 PR CT GUIDANCE PLACEMENT RAD THERAPY FIELDS: CPT | Mod: 26 | Performed by: RADIOLOGY

## 2021-10-01 ENCOUNTER — HOSPITAL ENCOUNTER (OUTPATIENT)
Dept: RADIATION ONCOLOGY | Facility: MEDICAL CENTER | Age: 86
End: 2021-10-31
Attending: RADIOLOGY
Payer: MEDICARE

## 2021-10-01 ENCOUNTER — HOSPITAL ENCOUNTER (OUTPATIENT)
Dept: RADIATION ONCOLOGY | Facility: MEDICAL CENTER | Age: 86
End: 2021-10-01

## 2021-10-01 LAB
CHEMOTHERAPY INFUSION START DATE: NORMAL
CHEMOTHERAPY RECORDS: 2
CHEMOTHERAPY RECORDS: 7000
CHEMOTHERAPY RECORDS: NORMAL
CHEMOTHERAPY RX CANCER: NORMAL
DATE 1ST CHEMO CANCER: NORMAL
RAD ONC ARIA COURSE LAST TREATMENT DATE: NORMAL
RAD ONC ARIA COURSE TREATMENT ELAPSED DAYS: NORMAL
RAD ONC ARIA REFERENCE POINT DOSAGE GIVEN TO DATE: 59.88
RAD ONC ARIA REFERENCE POINT DOSAGE GIVEN TO DATE: 66
RAD ONC ARIA REFERENCE POINT ID: NORMAL
RAD ONC ARIA REFERENCE POINT ID: NORMAL
RAD ONC ARIA REFERENCE POINT SESSION DOSAGE GIVEN: 1.81
RAD ONC ARIA REFERENCE POINT SESSION DOSAGE GIVEN: 2

## 2021-10-01 PROCEDURE — 77014 PR CT GUIDANCE PLACEMENT RAD THERAPY FIELDS: CPT | Mod: 26 | Performed by: RADIOLOGY

## 2021-10-01 PROCEDURE — 77386 HCHG IMRT DELIVERY COMPLEX: CPT | Performed by: RADIOLOGY

## 2021-10-01 PROCEDURE — 77336 RADIATION PHYSICS CONSULT: CPT | Performed by: RADIOLOGY

## 2021-10-04 ENCOUNTER — HOSPITAL ENCOUNTER (OUTPATIENT)
Dept: RADIATION ONCOLOGY | Facility: MEDICAL CENTER | Age: 86
End: 2021-10-04
Payer: MEDICARE

## 2021-10-04 DIAGNOSIS — C09.9 TONSIL CANCER (HCC): ICD-10-CM

## 2021-10-04 LAB
CHEMOTHERAPY INFUSION START DATE: NORMAL
CHEMOTHERAPY RECORDS: 2
CHEMOTHERAPY RECORDS: 7000
CHEMOTHERAPY RECORDS: NORMAL
CHEMOTHERAPY RX CANCER: NORMAL
DATE 1ST CHEMO CANCER: NORMAL
RAD ONC ARIA COURSE LAST TREATMENT DATE: NORMAL
RAD ONC ARIA COURSE TREATMENT ELAPSED DAYS: NORMAL
RAD ONC ARIA REFERENCE POINT DOSAGE GIVEN TO DATE: 61.69
RAD ONC ARIA REFERENCE POINT DOSAGE GIVEN TO DATE: 68
RAD ONC ARIA REFERENCE POINT ID: NORMAL
RAD ONC ARIA REFERENCE POINT ID: NORMAL
RAD ONC ARIA REFERENCE POINT SESSION DOSAGE GIVEN: 1.81
RAD ONC ARIA REFERENCE POINT SESSION DOSAGE GIVEN: 2

## 2021-10-04 PROCEDURE — 77386 HCHG IMRT DELIVERY COMPLEX: CPT | Performed by: RADIOLOGY

## 2021-10-04 PROCEDURE — 77014 PR CT GUIDANCE PLACEMENT RAD THERAPY FIELDS: CPT | Mod: 26 | Performed by: RADIOLOGY

## 2021-10-05 ENCOUNTER — HOSPITAL ENCOUNTER (OUTPATIENT)
Dept: RADIATION ONCOLOGY | Facility: MEDICAL CENTER | Age: 86
End: 2021-10-05
Payer: MEDICARE

## 2021-10-05 DIAGNOSIS — L58.0 ACUTE RADIATION DERMATITIS: ICD-10-CM

## 2021-10-05 DIAGNOSIS — C09.9 TONSIL CANCER (HCC): ICD-10-CM

## 2021-10-05 LAB
CHEMOTHERAPY INFUSION START DATE: NORMAL
CHEMOTHERAPY RECORDS: 2
CHEMOTHERAPY RECORDS: 7000
CHEMOTHERAPY RECORDS: NORMAL
CHEMOTHERAPY RX CANCER: NORMAL
DATE 1ST CHEMO CANCER: NORMAL
RAD ONC ARIA COURSE LAST TREATMENT DATE: NORMAL
RAD ONC ARIA COURSE TREATMENT ELAPSED DAYS: NORMAL
RAD ONC ARIA REFERENCE POINT DOSAGE GIVEN TO DATE: 63.51
RAD ONC ARIA REFERENCE POINT DOSAGE GIVEN TO DATE: 70
RAD ONC ARIA REFERENCE POINT ID: NORMAL
RAD ONC ARIA REFERENCE POINT ID: NORMAL
RAD ONC ARIA REFERENCE POINT SESSION DOSAGE GIVEN: 1.81
RAD ONC ARIA REFERENCE POINT SESSION DOSAGE GIVEN: 2

## 2021-10-05 PROCEDURE — 77014 PR CT GUIDANCE PLACEMENT RAD THERAPY FIELDS: CPT | Mod: 26 | Performed by: RADIOLOGY

## 2021-10-05 PROCEDURE — 77427 RADIATION TX MANAGEMENT X5: CPT | Performed by: RADIOLOGY

## 2021-10-05 PROCEDURE — 77386 HCHG IMRT DELIVERY COMPLEX: CPT | Performed by: RADIOLOGY

## 2021-10-08 NOTE — RADIATION COMPLETION NOTES
END OF TREATMENT SUMMARY    Patient name:  Ina Small    Primary Physician:  HENRRY Earl MRN: [id number]  CSN: [id number]   Referring physician:  Bigg Ren M.D. : 10/28/1933, 87 y.o.                STAGE: Tonsil cancer (HCC)  Staging form: Pharynx - HPV-Mediated Oropharynx, AJCC 8th Edition  - Clinical stage from 2021: Stage I (cT1, cN1, cM0, p16+) - Signed by Bailee CASTANEDA M.D. on 2021  Histopathologic type: Squamous cell carcinoma, NOS  Stage prefix: Initial diagnosis       TREATMENT INDICATION:   Well lateralized left tonsillar oropharyngeal cancer, p16 positive, treated with definitive radiotherapy to the tonsil and ipsilateral neck     COURSE START DATE:   Course First Treatment Date   Date Value Ref Range Status   10/05/2021 2021  Final        COURSE END DATE:   Course Last Treatment Date   Date Value Ref Range Status   10/05/2021 10/05/2021  Final          ELAPSED DAYS:   Course Elapsed Days   Date Value Ref Range Status   10/05/2021 49 @ [id number]  Final        INTENT:   Course Intent   Date Value Ref Range Status   10/05/2021 Curative  Final        CONCURRENT SYSTEMIC TREATMENT:   None     RT COURSE DISCONTINUED EARLY:   No     PATIENT EXPERIENCE:   Toxicity Assessment 2021 2021 9/15/2021 2021 2021 2021 2021   Toxicity Assessment Head/Neck Head/Neck Head/Neck Head/Neck Head/Neck Head/Neck Head/Neck   Fatigue (lethargy, malaise, asthenia) Moderate (e.g., decrease in performance status by 1 ECOG level or 20% Karnofsky) or causing difficulty performing some activities Increased fatigue over baseline, but not altering normal activities Increased fatigue over baseline, but not altering normal activities Increased fatigue over baseline, but not altering normal activities Increased fatigue over baseline, but not altering normal activities None None   Radiation Dermatitis Faint erythema or dry desquamation Faint erythema or dry  desquamation Faint erythema or dry desquamation None None None None   Rash/desquamation None - None None None None None   Constipation Requiring stool softener or dietary modification None None None None None None   Dehydration None Dry mucous membranes and/or diminished skin turgor Dry mucous membranes and/or diminished skin turgor Dry mucous membranes and/or diminished skin turgor None None None   Mouth Dryness Moderate Mild Mild Mild Normal Normal Normal   RT Dysphagia-Pharyngeal Dysphagia, requiring predominantly pureed, soft, or liquid diet Dysphagia, requiring predominantly pureed, soft, or liquid diet Mild dysphagia, but can eat regular diet Mild dysphagia, but can eat regular diet Mild dysphagia, but can eat regular diet Mild dysphagia, but can eat regular diet None   Mucositis None Erythema of mucosa Erythema of mucosa Erythema of mucosa None Erythema of mucosa None   Salivary Gland Changes Sticky thickened saliva, may have slightly altered taste (e.g. metallic), additional fluids may be required Sticky thickened saliva, may have slightly altered taste (e.g. metallic), additional fluids may be required Sticky thickened saliva, may have slightly altered taste (e.g. metallic), additional fluids may be required Sticky thickened saliva, may have slightly altered taste (e.g. metallic), additional fluids may be required Sticky thickened saliva, may have slightly altered taste (e.g. metallic), additional fluids may be required Sticky thickened saliva, may have slightly altered taste (e.g. metallic), additional fluids may be required None   Stomatitis/Pharyngitis - - None - - - None   Taste Disturbance (dysgeusia) Markedly altered Markedly altered Markedly altered Markedly altered Slightly altered Slightly altered Normal   RT - Pain due to RT Mild pain not interfering with function Moderate pain, pain or analgesics interfering with function, but not interfering with activities of daily living Mild pain not  interfering with function Mild pain not interfering with function Mild pain not interfering with function None None   Cough Absent Absent Absent Absent Absent Absent Absent   Voice changes/stridor/larynx (hoarseness, loss of voice, laryngitis) Mild or intermittent hoarseness Normal Normal Normal Mild or intermittent hoarseness Normal Normal          FOLLOW-UP PLAN:   6 Weeks     COMMENT:          ANATOMIC TARGET SUMMARY    ANATOMIC TARGET MODALITY TECHNIQUE   Left tonsil and left neck   External beam, photons IMRT            COMMENT:     TREATMENT SUMMARY:  Aria Treatment Information        Some values may be hidden. Unless noted otherwise, only the newest values recorded on each date are displayed.         Aria Treatment Summary 10/5/21   Course First Treatment Date 08/17/2021   Course Last Treatment Date 10/05/2021   L Tonsil [HN] Plan from Course [id number]   Fraction 35 of 35   Elapsed Course Days 49 @ [id number]   Prescribed Fraction Dose 200 cGy   Prescribed Total Dose 7,000 cGy   HN cp Reference Point from Course [id number]   Elapsed Course Days 49 @ [id number]   Session Dose 181 cGy   Total Dose 6,351 cGy   PTV70 Reference Point from Course [id number]   Elapsed Course Days 49 @ [id number]   Session Dose 200 cGy   Total Dose 7,000 cGy               DIAGRAMS:      DOSE VOLUME HISTOGRAMS:        Bailee CASTANEDA M.D.  Electronically signed by: Bailee CASTANEDA M.D., 10/20/2021 8:05 AM  [phone]

## 2021-10-11 LAB
CHEMOTHERAPY INFUSION START DATE: NORMAL
CHEMOTHERAPY INFUSION STOP DATE: NORMAL
CHEMOTHERAPY RECORDS: 2
CHEMOTHERAPY RECORDS: 7000
CHEMOTHERAPY RECORDS: NORMAL
CHEMOTHERAPY RX CANCER: NORMAL
DATE 1ST CHEMO CANCER: NORMAL
RAD ONC ARIA COURSE LAST TREATMENT DATE: NORMAL
RAD ONC ARIA COURSE TREATMENT ELAPSED DAYS: NORMAL
RAD ONC ARIA REFERENCE POINT DOSAGE GIVEN TO DATE: 63.51
RAD ONC ARIA REFERENCE POINT DOSAGE GIVEN TO DATE: 70
RAD ONC ARIA REFERENCE POINT ID: NORMAL
RAD ONC ARIA REFERENCE POINT ID: NORMAL

## 2021-11-04 ENCOUNTER — APPOINTMENT (OUTPATIENT)
Dept: SPEECH THERAPY | Facility: REHABILITATION | Age: 86
End: 2021-11-04
Attending: RADIOLOGY
Payer: MEDICARE

## 2021-11-05 ENCOUNTER — SPEECH THERAPY (OUTPATIENT)
Dept: SPEECH THERAPY | Facility: REHABILITATION | Age: 86
End: 2021-11-05
Attending: RADIOLOGY
Payer: MEDICARE

## 2021-11-05 DIAGNOSIS — R13.12 OROPHARYNGEAL DYSPHAGIA: ICD-10-CM

## 2021-11-05 PROCEDURE — 92526 ORAL FUNCTION THERAPY: CPT | Performed by: SPEECH-LANGUAGE PATHOLOGIST

## 2021-11-05 NOTE — OP THERAPY DAILY TREATMENT
Outpatient Speech Therapy  DAILY TREATMENT     [facility] Speech [facility]  [address]  [address]  [place] [address]  Phone:  [phone]  Fax:  [phone]    Date: 11/05/2021    Patient: Ina Small  YOB: 1933  MRN: [id number]     Time Calculation    Start time: 1015   1100           Chief Complaint: Dysphagia    Visit #: 5    Subjective:   Therapy History:     Previous Diet:  Pureed Foods (Per notes, patient modifying to puree at last visit)    Current Diet:  Mechanical Soft Foods    Hearing:  No Deficits    Dentition:  Complete Dentition  Additional Subjective Comments:      This was patient's first visit with ST since her last radiation appointment. Patient reported that she is doing well and was unsure if she needed to continue ST. Patient is noted a good historian and able to give accurate and detailed information regarding current swallow functioning.       Objective:   Treatment Intervention tool(s) used:  SLP provided education regarding safe swallow strategies, swallow exercises, GERD precautions and modified diets. SLP also provided patient with PO trials of current diet to assess least restrictive diet.   Objective Details:  Patient reports eating softer textures due to odynophagia. She also reports making smoothies with protein powder, fruit and avacado to supplement her diet. Patient reported difficulty with bread as well as spicy or acidic foods. She is able to successfully modify her diet to include a variety of foods while reducing odynophagia. SLP provided additional strategies for modifying the textures of desired foods.     Patient also reported a history of reflux, which she manages with diet. SLP provided education regarding GERD precautions.     SLP also provided PO trials of soft textures and thin liquids. Patient is noted with intact oral phase, as indicated by timely FRANKY and trace oral cavity residue. Patient is also noted with indicators of mild  "pharyngeal dysphagia, including multiple swallow per bolus with solids. Patient was without overt s/sx of aspiration for all PO trials when alternating solids/liquids to emulate a meal.      SLP also provided education regarding swallow exercises. Patient had a folder with packets of information for swallow exercises. She reports being very consistent during treatment but has since not done them as regularly. SLP discussed the importance of continuing the ROM and strengthening exercises and suggested times during he day to incorporate them into her routine (while watching TV, driving in the car).          Speech Therapy Assessment:     Oral Motor Status:     Labial strength and control for patient: Good    Lingual strength and control for patient: Good    Patient saliva management: Good    Patient awareness of swallow problem: Good    Oral motor status comments: Patient reported difficulty managing \"thick\" saliva, which she treats with Xylimelts.     Oral Phase Assessment:     Types of food within functional limits: Mechanical Soft and Thin Liquid    Oral phase comments: Patient is noted with a timely FRANKY and trace oral cavity residue.     Pharyngeal Phase Assessment:     Pharyngeal phase comments: Patient required multiple swallows for soft solids but was without overt s/sx of aspiration for all PO trials.       Speech Therapy Plan :   Prognosis & Recommendations  Impression Summary: Patient was seen this date for ongoing dysphagia tx. Patient is independent for safe swallow strategies, modifying her diet and swallow exercises. Patient was also without overt s/sx of aspiration for PO trials of soft solids and thin liquids.   Prognosis:  Excellent  Prognosis Details:  Patient is motivated and independent  Compensatory swallow strategies:  Reduce bolus size, Multiple swallows, Alternate liquids/solids and Upright position 90 degrees during meal and 45 minutes after meal  Diet Recommendation:  Mechanical Soft " Foods  Liquid Recommendation:  Thin  Therapy Recommendations  Recommendation:  Individual Speech Therapy, Patient inquired as to whether or not she needed to continue ST. SLP discussed continuing patient's home program and following up with ST if there are any changes or concerns.

## 2021-11-18 ENCOUNTER — HOSPITAL ENCOUNTER (OUTPATIENT)
Dept: RADIATION ONCOLOGY | Facility: MEDICAL CENTER | Age: 86
End: 2021-11-30
Attending: RADIOLOGY
Payer: MEDICARE

## 2021-11-18 VITALS
HEART RATE: 84 BPM | WEIGHT: 148.37 LBS | TEMPERATURE: 97.8 F | OXYGEN SATURATION: 93 % | SYSTOLIC BLOOD PRESSURE: 152 MMHG | BODY MASS INDEX: 23.96 KG/M2 | DIASTOLIC BLOOD PRESSURE: 76 MMHG

## 2021-11-18 DIAGNOSIS — C09.9 TONSIL CANCER (HCC): ICD-10-CM

## 2021-11-18 PROCEDURE — 31575 DIAGNOSTIC LARYNGOSCOPY: CPT | Performed by: RADIOLOGY

## 2021-11-18 PROCEDURE — 99212 OFFICE O/P EST SF 10 MIN: CPT | Mod: 25 | Performed by: RADIOLOGY

## 2021-11-18 ASSESSMENT — PAIN SCALES - GENERAL: PAINLEVEL: NO PAIN

## 2021-11-18 NOTE — PROGRESS NOTES
RADIATION ONCOLOGY FOLLOW-UP    DATE OF SERVICE:   11/18/2021    IDENTIFICATION:   A 88 y.o. female with  Tonsil cancer (HCC)  Staging form: Pharynx - HPV-Mediated Oropharynx, AJCC 8th Edition  - Clinical stage from 7/30/2021: Stage I (cT1, cN1, cM0, p16+) - Signed by Bailee CASTANEDA M.D. on 7/30/2021  Histopathologic type: Squamous cell carcinoma, NOS  Stage prefix: Initial diagnosis      RADIATION SUMMARY:  Aria Treatment Information        Some values may be hidden. Unless noted otherwise, only the newest values recorded on each date are displayed.         Aria Treatment Summary 10/5/21   Course First Treatment Date 08/17/2021   Course Last Treatment Date 10/05/2021   Fraction 35 of 35    Elapsed Course Days 49 @ [id number]    Prescribed Fraction Dose 200 cGy    Prescribed Total Dose 7,000 cGy    Elapsed Course Days 49 @ [id number]    Session Dose --    Total Dose 6,351 cGy    Elapsed Course Days 49 @ [id number]    Session Dose --    Total Dose 7,000 cGy        HISTORY OF PRESENT ILLNESS:   87-year-old female with past medical history significant for elevated cholesterol, hypertension, peripheral neuropathy, and cataracts.  She presented to ENT in May for work-up of sinus congestion.  While undergoing work-up she was noted to have abnormal tonsils described as 2+ with a 5 mm papillomatous growth involving the right tonsil.     Removal of the growth was recommended with concern for possible predisposition to cancer.     She underwent bilateral tonsillectomy 7/15/2021.  The right tonsil was negative for malignancy.  The left tonsil demonstrated squamous cell carcinoma.     Follow-up MRI of the neck and soft tissues 7/23/2021 demonstrated a level two jugulodigastric node on the left measuring approximately 2.3 cm.  PET CT scan 7/26/2021 demonstrated bilateral tonsillar uptake considered to be postoperative.  She also had some mild uptake noted in the level two left neck node.  Uptake in the node was not  mentioned in the PET/CT report.     Her primary complaint at this time is pharyngitis post tonsillectomy.  She denies otalgia and weight loss.     She has a remote smoking history total 7.5 pack years.  Stopped smoking approximately 50 years ago.    INTERVAL HISTORY:  Initial follow-up visit post completion of therapy.  Reports some mild odynophagia left posterior throat.  Is able to eat reports her appetite is not as good as it used to be.  Denies dysphagia.  Xerostomia 6 ( 0-10, 0=completely dry), Taste 6 ( 0-10, 0=no taste), Dysphagia 0, Odynophagia mild    CURRENT MEDICATIONS:  Current Outpatient Medications   Medication Sig Dispense Refill   • Ibuprofen (ADVIL) 200 MG Cap Take  by mouth.     • ALPRAZolam (XANAX) 0.25 MG Tab Take 0.25 mg by mouth at bedtime as needed for Sleep.     • fluticasone (FLONASE) 50 MCG/ACT nasal spray Spray 1-2 Sprays in nose 1 time daily as needed (for allergies).     • fexofenadine (ALLEGRA ALLERGY) 180 MG tablet Take 180 mg by mouth every morning.     • hydroCHLOROthiazide (HYDRODIURIL) 25 MG Tab Take 25 mg by mouth every morning.     • gabapentin (NEURONTIN) 300 MG CAPS Take 600 mg by mouth 2 times a day.     • silver sulfADIAZINE (SILVADENE) 1 % Cream Apply 1 g topically every day. (Patient not taking: Reported on 11/18/2021) 400 g 3   • silver sulfADIAZINE (SILVADENE) 1 % Cream Apply 1/8 inch layer to affected area BID (Patient not taking: Reported on 11/18/2021) 400 g 2     No current facility-administered medications for this encounter.       ALLERGIES:  Shellfish allergy and Garlic    REVIEW OF SYSTEMS:    A complete review of system taken. Pertinent items in HPI.     PHYSICAL EXAM:   PERFORMANCE STATUS:  Karnofsky Score 7/30/2021   Karnofsky Score 80   Some recent data might be hidden     Vitals 11/18/2021 9/29/2021 9/22/2021 9/15/2021 9/8/2021 9/1/2021 8/25/2021   SYSTOLIC 152 143 148 145 147 156 144   DIASTOLIC 76 71 75 80 68 81 77   PULSE 84 91 75 71 73 67 83   TEMPERATURE  97.8 - - 98.4 - - 97.2   RESPIRATIONS - - - - - - -   WEIGHT 148.37 152.34 153 152 152.89 154.21 153.11   HEIGHT - - - - - - -   BMI 23.96 kg/m2 24.6 kg/m2 24.71 kg/m2 24.55 kg/m2 24.69 kg/m2 24.9 kg/m2 24.72 kg/m2   SPO2 93 92 95 92 96 90 95       Physical Exam  Vitals and nursing note reviewed.   Constitutional:       General: She is not in acute distress.     Appearance: She is well-developed.   HENT:      Head: Normocephalic.      Mouth/Throat:      Mouth: Mucous membranes are moist.      Pharynx: Oropharynx is clear.      Comments: Small area of mucositis involving the left posterior lateral tongue  Eyes:      Conjunctiva/sclera: Conjunctivae normal.      Pupils: Pupils are equal, round, and reactive to light.   Cardiovascular:      Rate and Rhythm: Normal rate and regular rhythm.   Pulmonary:      Effort: Pulmonary effort is normal.   Abdominal:      General: Bowel sounds are normal.      Palpations: Abdomen is soft.   Musculoskeletal:         General: Normal range of motion.      Cervical back: Normal range of motion.   Lymphadenopathy:      Cervical: No cervical adenopathy.   Skin:     General: Skin is warm and dry.      Findings: No erythema.   Neurological:      Mental Status: She is alert and oriented to person, place, and time.   Psychiatric:         Behavior: Behavior normal.         Thought Content: Thought content normal.         Judgment: Judgment normal.           FIBEROPTIC EXAM:  Normal exam    LABORATORY DATA:   Lab Results   Component Value Date/Time    WBC 7.0 07/01/2021 01:45 PM    RBC 5.35 07/01/2021 01:45 PM    HEMOGLOBIN 15.4 07/01/2021 01:45 PM    HEMATOCRIT 46.9 07/01/2021 01:45 PM    MCV 87.7 07/01/2021 01:45 PM    MCH 28.8 07/01/2021 01:45 PM    MCHC 32.8 (L) 07/01/2021 01:45 PM    RDW 44.2 07/01/2021 01:45 PM    PLATELETCT 256 07/01/2021 01:45 PM    MPV 11.0 07/01/2021 01:45 PM      Lab Results   Component Value Date/Time    SODIUM 132 (L) 07/01/2021 01:45 PM    POTASSIUM 3.8  07/01/2021 01:45 PM    CHLORIDE 93 (L) 07/01/2021 01:45 PM    CO2 28 07/01/2021 01:45 PM    GLUCOSE 112 (H) 07/01/2021 01:45 PM    BUN 14 07/01/2021 01:45 PM    CREATININE 0.97 07/01/2021 01:45 PM         RADIOLOGY DATA:  No results found.    IMPRESSION:    A 88 y.o. with  Tonsil cancer (HCC)  Staging form: Pharynx - HPV-Mediated Oropharynx, AJCC 8th Edition  - Clinical stage from 7/30/2021: Stage I (cT1, cN1, cM0, p16+) - Signed by Bailee CASTANEDA M.D. on 7/30/2021  Histopathologic type: Squamous cell carcinoma, NOS  Stage prefix: Initial diagnosis      CANCER STATUS:  Therapy Completed, Status Pending Restaging Work-up    RECOMMENDATIONS:   Reviewed physical exam and fiberoptic exam with patient.  Reassured her.  I do not see any evidence of residual disease.  Did recommend post therapy PET CT scan which will obtain end of December, little greater than 12 weeks post completion of therapy, with follow-up post imaging.    Thank you for the opportunity to participate in her care.  If any questions or comments, please do not hesitate in calling.      Orders Placed This Encounter   • [id number] BASE TO MID-THIGH

## 2021-11-18 NOTE — NON-PROVIDER
Patient was seen today in clinic with Dr. Byrne for follow up.  Vitals signs and weight were obtained and pain assessment was completed.  Allergies and medications were reviewed with the patient.  Toxicities of treatment assessed.     Vitals/Pain:  Vitals:    11/18/21 1419   BP: 152/76   Pulse: 84   Temp: 36.6 °C (97.8 °F)   SpO2: 93%   Weight: 67.3 kg (148 lb 5.9 oz)   Pain Score: No pain        Allergies:   Shellfish allergy and Garlic    Current Medications:  Current Outpatient Medications   Medication Sig Dispense Refill   • Ibuprofen (ADVIL) 200 MG Cap Take  by mouth.     • ALPRAZolam (XANAX) 0.25 MG Tab Take 0.25 mg by mouth at bedtime as needed for Sleep.     • fluticasone (FLONASE) 50 MCG/ACT nasal spray Spray 1-2 Sprays in nose 1 time daily as needed (for allergies).     • fexofenadine (ALLEGRA ALLERGY) 180 MG tablet Take 180 mg by mouth every morning.     • hydroCHLOROthiazide (HYDRODIURIL) 25 MG Tab Take 25 mg by mouth every morning.     • gabapentin (NEURONTIN) 300 MG CAPS Take 600 mg by mouth 2 times a day.     • silver sulfADIAZINE (SILVADENE) 1 % Cream Apply 1 g topically every day. (Patient not taking: Reported on 11/18/2021) 400 g 3   • silver sulfADIAZINE (SILVADENE) 1 % Cream Apply 1/8 inch layer to affected area BID (Patient not taking: Reported on 11/18/2021) 400 g 2   • chlorhexidine (PERIDEX) 0.12 % Solution Take 15 mL by mouth as needed. (Patient not taking: Reported on 11/18/2021)       No current facility-administered medications for this encounter.         PCP:  Maxwell Hendricks, Med Ass't

## 2021-11-19 NOTE — PROCEDURES
DATE OF SERVICE: 11/18/2021         FIBEROPTIC NASO-PHARYNGOSCOPY NOTE      Flexible fiberoptic exam was performed after anesthesia of left nostril and oropharynx.    Nasopharynx:       R. Eustachian canal opening, torus, fossa of Rosenmuller appear normal  L. Eustachian canal opening, torus, fossa of Rosenmuller appear normal      Oropharynx:        Base of tongue normal appearing.  Vallecula normal appearing.  Epiglottis normal appearing.  PE folds normal appearing.    Larynx:      R. AE fold, false vocal fold, arytenoid appear normal  L. AE fold, false vocal fold, arytenoid appear normal  R. Cord mobile  L. Cord mobile   R. Piriform sinus appears normal  L. Piriform sinus appears normal      Bailee CASTANEDA M.D.  Electronically signed by: Bailee CASTANEDA M.D., 11/18/2021 4:07 PM  [phone]

## 2021-11-23 ENCOUNTER — TELEPHONE (OUTPATIENT)
Dept: SPEECH THERAPY | Facility: REHABILITATION | Age: 86
End: 2021-11-23

## 2021-11-23 NOTE — OP THERAPY DISCHARGE SUMMARY
Outpatient Speech Language Pathology  DISCHARGE SUMMARY NOTE      [facility] Speech Language Pathology [address]  [address]  [place] [address]  Phone:  [phone]  Fax:  [phone]        Patient Name:  Ina Small  :  10/28/1933  MR#:  [id number]    Referring Provider: Bailee CASTANEDA M.D.  [address]  [address]  [place] [address]   Referring Diagnosis Tonsil cancer (HCC) [C09.9]             Your patient is being discharged from Speech therapy with the following comments:  Pt. has failed to schedule or reschedule follow up visits          Fanta Santa, SLP

## 2021-12-30 ENCOUNTER — HOSPITAL ENCOUNTER (OUTPATIENT)
Dept: RADIOLOGY | Facility: MEDICAL CENTER | Age: 86
End: 2021-12-30
Attending: RADIOLOGY
Payer: MEDICARE

## 2021-12-30 DIAGNOSIS — C09.9 TONSIL CANCER (HCC): ICD-10-CM

## 2021-12-30 PROCEDURE — A9552 F18 FDG: HCPCS

## 2022-01-04 ENCOUNTER — HOSPITAL ENCOUNTER (OUTPATIENT)
Dept: RADIATION ONCOLOGY | Facility: MEDICAL CENTER | Age: 87
End: 2022-01-31
Attending: RADIOLOGY
Payer: MEDICARE

## 2022-01-04 VITALS
TEMPERATURE: 96.6 F | RESPIRATION RATE: 18 BRPM | HEART RATE: 80 BPM | BODY MASS INDEX: 24.22 KG/M2 | OXYGEN SATURATION: 97 % | WEIGHT: 150 LBS | SYSTOLIC BLOOD PRESSURE: 152 MMHG | DIASTOLIC BLOOD PRESSURE: 83 MMHG

## 2022-01-04 PROCEDURE — 99213 OFFICE O/P EST LOW 20 MIN: CPT | Mod: 25 | Performed by: RADIOLOGY

## 2022-01-04 PROCEDURE — 99212 OFFICE O/P EST SF 10 MIN: CPT | Mod: 25 | Performed by: RADIOLOGY

## 2022-01-04 PROCEDURE — 31575 DIAGNOSTIC LARYNGOSCOPY: CPT | Performed by: RADIOLOGY

## 2022-01-04 RX ORDER — VITAMIN B COMPLEX
1000 TABLET ORAL DAILY
COMMUNITY

## 2022-01-04 ASSESSMENT — PAIN SCALES - GENERAL: PAINLEVEL: 1=MINIMAL PAIN

## 2022-01-04 NOTE — PROCEDURES
DATE OF SERVICE: 1/4/2022         FIBEROPTIC NASO-PHARYNGOSCOPY NOTE      Flexible fiberoptic exam was performed after anesthesia of left nostril and oropharynx.    Nasopharynx:       R. Eustachian canal opening, torus, fossa of Rosenmuller appear normal  L. Eustachian canal opening, torus, fossa of Rosenmuller appear normal      Oropharynx:        Base of tongue normal appearing.  Vallecula normal appearing.  Epiglottis normal appearing.  PE folds normal appearing.    Larynx:      R. AE fold, false vocal fold, arytenoid appear normal  L. AE fold, false vocal fold, arytenoid appear normal  R. Cord mobile  L. Cord mobile   R. Piriform sinus appears normal  L. Piriform sinus appears normal      Baliee CASTANEDA M.D.  Electronically signed by: Bailee CASTANEDA M.D., 1/4/2022 2:42 PM  [phone]

## 2022-01-04 NOTE — PROGRESS NOTES
RADIATION ONCOLOGY FOLLOW-UP    Patient name:  Ina Small    Primary Physician:  HENRRY Earl MRN: [id number]  CSN: [id number]   Referring physician:  No ref. provider found : 10/28/1933, 88 y.o.     DATE OF SERVICE:   2022    IDENTIFICATION:   A 88 y.o. female with  Tonsil cancer (HCC)  Staging form: Pharynx - HPV-Mediated Oropharynx, AJCC 8th Edition  - Clinical stage from 2021: Stage I (cT1, cN1, cM0, p16+) - Signed by Bailee CASTANEDA M.D. on 2021  Histopathologic type: Squamous cell carcinoma, NOS  Stage prefix: Initial diagnosis      RADIATION SUMMARY:  [facility] Treatment Information        Some values may be hidden. Unless noted otherwise, only the newest values recorded on each date are displayed.         [facility] Treatment Summary 10/5/21 10/11/21   Course First Treatment Date 2021   Course Last Treatment Date 10/05/2021 10/05/2021   HN Plan from Course [id number]   Fraction 35 of 35     Elapsed Course Days 49 @ [id number]     Prescribed Fraction Dose 200 cGy     Prescribed Total Dose 7,000 cGy     HN cp Reference Point from Course [id number]   Elapsed Course Days 49 @ [id number]     Session Dose --     Total Dose 6,351 cGy     PTV70 Reference Point from Course [id number]   Elapsed Course Days 49 @ [id number]     Session Dose --     Total Dose 7,000 cGy      Some values recorded on this date have been omitted.               HISTORY OF PRESENT ILLNESS:   87-year-old female with past medical history significant for elevated cholesterol, hypertension, peripheral neuropathy, and cataracts.  She presented to ENT in May for work-up of sinus congestion.  While undergoing work-up she was noted to have abnormal tonsils described as 2+ with a 5 mm papillomatous growth involving the right tonsil.     Removal of the growth was recommended with concern for possible predisposition to cancer.     She underwent bilateral tonsillectomy 7/15/2021.  The right  tonsil was negative for malignancy.  The left tonsil demonstrated squamous cell carcinoma.     Follow-up MRI of the neck and soft tissues 7/23/2021 demonstrated a level two jugulodigastric node on the left measuring approximately 2.3 cm.  PET CT scan 7/26/2021 demonstrated bilateral tonsillar uptake considered to be postoperative.  She also had some mild uptake noted in the level two left neck node.  Uptake in the node was not mentioned in the PET/CT report.     Her primary complaint at this time is pharyngitis post tonsillectomy.  She denies otalgia and weight loss.     She has a remote smoking history total 7.5 pack years.  Stopped smoking approximately 50 years ago.     11/18/21 Initial follow-up visit post completion of therapy.  Reports some mild odynophagia left posterior throat.  Is able to eat reports her appetite is not as good as it used to be.  Denies dysphagia.  Xerostomia 6 ( 0-10, 0=completely dry), Taste 6 ( 0-10, 0=no taste), Dysphagia 0, Odynophagia mild    INTERVAL HISTORY:  Scheduled follow-up visit after restaging PET CT scan.  Overall doing well.  Taste and dry mouth improving slowly.  No pain.  No significant dysphagia.  Xerostomia 3-5 ( 0-10, 0=completely dry), Taste 3 5 ( 0-10, 0=no taste), Dysphagia mild, Odynophagia 0    CURRENT MEDICATIONS:  Current Outpatient Medications   Medication Sig Dispense Refill   • Ibuprofen (ADVIL) 200 MG Cap Take  by mouth.     • ALPRAZolam (XANAX) 0.25 MG Tab Take 0.25 mg by mouth at bedtime as needed for Sleep.     • fluticasone (FLONASE) 50 MCG/ACT nasal spray Spray 1-2 Sprays in nose 1 time daily as needed (for allergies).     • fexofenadine (ALLEGRA ALLERGY) 180 MG tablet Take 180 mg by mouth every morning.     • hydroCHLOROthiazide (HYDRODIURIL) 25 MG Tab Take 25 mg by mouth every morning.     • gabapentin (NEURONTIN) 300 MG CAPS Take 600 mg by mouth 2 times a day.       No current facility-administered medications for this encounter.        ALLERGIES:  Shellfish allergy and Garlic    REVIEW OF SYSTEMS:    A complete review of system taken. Pertinent items in HPI.     PHYSICAL EXAM:   PERFORMANCE STATUS:  Karnofsky Score 7/30/2021   Karnofsky Score 80   Some recent data might be hidden     Vitals 1/4/2022 11/18/2021 9/29/2021 9/22/2021 9/15/2021 9/8/2021 9/1/2021   SYSTOLIC 152 152 143 148 145 147 156   DIASTOLIC 83 76 71 75 80 68 81   PULSE 80 84 91 75 71 73 67   TEMPERATURE 96.6 97.8 - - 98.4 - -   RESPIRATIONS 18 - - - - - -   WEIGHT 150 148.37 152.34 153 152 152.89 154.21   HEIGHT - - - - - - -   BMI 24.22 kg/m2 23.96 kg/m2 24.6 kg/m2 24.71 kg/m2 24.55 kg/m2 24.69 kg/m2 24.9 kg/m2   SPO2 97 93 92 95 92 96 90       Physical Exam  Vitals and nursing note reviewed.   Constitutional:       Appearance: She is well-developed.   HENT:      Head: Normocephalic.      Mouth/Throat:      Mouth: Mucous membranes are moist.      Pharynx: Oropharynx is clear. No oropharyngeal exudate or posterior oropharyngeal erythema.   Cardiovascular:      Rate and Rhythm: Normal rate and regular rhythm.   Pulmonary:      Effort: Pulmonary effort is normal.   Abdominal:      General: Abdomen is flat.      Palpations: Abdomen is soft.   Lymphadenopathy:      Cervical: No cervical adenopathy.   Skin:     General: Skin is warm and dry.      Findings: No erythema.   Neurological:      Mental Status: She is alert and oriented to person, place, and time.   Psychiatric:         Behavior: Behavior normal.         Thought Content: Thought content normal.         Judgment: Judgment normal.           FIBEROPTIC EXAM:  Normal exam    LABORATORY DATA:   Lab Results   Component Value Date/Time    WBC 7.0 07/01/2021 01:45 PM    RBC 5.35 07/01/2021 01:45 PM    HEMOGLOBIN 15.4 07/01/2021 01:45 PM    HEMATOCRIT 46.9 07/01/2021 01:45 PM    MCV 87.7 07/01/2021 01:45 PM    MCH 28.8 07/01/2021 01:45 PM    MCHC 32.8 (L) 07/01/2021 01:45 PM    RDW 44.2 07/01/2021 01:45 PM    PLATELETCT 256  07/01/2021 01:45 PM    MPV 11.0 07/01/2021 01:45 PM      Lab Results   Component Value Date/Time    SODIUM 132 (L) 07/01/2021 01:45 PM    POTASSIUM 3.8 07/01/2021 01:45 PM    CHLORIDE 93 (L) 07/01/2021 01:45 PM    CO2 28 07/01/2021 01:45 PM    GLUCOSE 112 (H) 07/01/2021 01:45 PM    BUN 14 07/01/2021 01:45 PM    CREATININE 0.97 07/01/2021 01:45 PM         RADIOLOGY DATA:  [id number] BASE TO MID-THIGH    Result Date: 12/30/2021  1. No evidence of abnormal FDG accumulation. 2. No metabolic residual or metastatic disease. No increased uptake in the cervical lymph nodes.      IMPRESSION:    A 88 y.o. with  Tonsil cancer (HCC)  Staging form: Pharynx - HPV-Mediated Oropharynx, AJCC 8th Edition  - Clinical stage from 7/30/2021: Stage I (cT1, cN1, cM0, p16+) - Signed by Bailee CASTANEDA M.D. on 7/30/2021  Histopathologic type: Squamous cell carcinoma, NOS  Stage prefix: Initial diagnosis      CANCER STATUS:  No Evidence of Disease    RECOMMENDATIONS:   Reviewed PET/CT with patient.  Reviewed fiberoptic exam with patient.  Reassured her.  At this point there appears to be no evidence of disease.  Recommended alternating follow-up  between myself and Dr. Ren.  She will return for follow-up in March.  She also states needing dental work upper arch.  This was not in the radiotherapy field and should be okay to proceed.    Thank you for the opportunity to participate in her care.  If any questions or comments, please do not hesitate in calling.      No orders of the defined types were placed in this encounter.

## 2022-02-09 ENCOUNTER — HOSPITAL ENCOUNTER (OUTPATIENT)
Dept: RADIOLOGY | Facility: MEDICAL CENTER | Age: 87
End: 2022-02-09
Attending: SPECIALIST
Payer: MEDICARE

## 2022-02-09 DIAGNOSIS — G50.1 ATYPICAL FACE PAIN: ICD-10-CM

## 2022-02-09 PROCEDURE — 70486 CT MAXILLOFACIAL W/O DYE: CPT | Mod: MH

## 2022-03-15 ENCOUNTER — HOSPITAL ENCOUNTER (OUTPATIENT)
Dept: RADIATION ONCOLOGY | Facility: MEDICAL CENTER | Age: 87
End: 2022-03-31
Attending: RADIOLOGY
Payer: MEDICARE

## 2022-03-15 VITALS
DIASTOLIC BLOOD PRESSURE: 74 MMHG | HEART RATE: 102 BPM | SYSTOLIC BLOOD PRESSURE: 144 MMHG | WEIGHT: 148 LBS | OXYGEN SATURATION: 91 % | TEMPERATURE: 96.7 F | BODY MASS INDEX: 23.9 KG/M2

## 2022-03-15 PROCEDURE — 31575 DIAGNOSTIC LARYNGOSCOPY: CPT | Performed by: RADIOLOGY

## 2022-03-15 PROCEDURE — 99213 OFFICE O/P EST LOW 20 MIN: CPT | Mod: 25 | Performed by: RADIOLOGY

## 2022-03-15 PROCEDURE — 99212 OFFICE O/P EST SF 10 MIN: CPT | Mod: 25 | Performed by: RADIOLOGY

## 2022-03-15 ASSESSMENT — PAIN SCALES - GENERAL: PAINLEVEL: NO PAIN

## 2022-03-15 NOTE — NON-PROVIDER
Patient was seen today in clinic with Dr. Byrne for follow up.  Vitals signs and weight were obtained and pain assessment was completed.  Allergies and medications were reviewed with the patient.  Toxicities of treatment assessed.     Vitals/Pain:  Vitals:    03/15/22 1537   BP: 144/74   BP Location: Right arm   Patient Position: Sitting   BP Cuff Size: Adult   Pulse: (!) 102   Temp: 35.9 °C (96.7 °F)   TempSrc: Temporal   SpO2: 91%   Weight: 67.1 kg (148 lb)   Pain Score: No pain        Allergies:   Shellfish allergy and Garlic    Current Medications:  Current Outpatient Medications   Medication Sig Dispense Refill   • vitamin D3 (CHOLECALCIFEROL) 1000 Unit (25 mcg) Tab Take 1,000 Units by mouth every day.     • Ibuprofen 200 MG Cap Take  by mouth.     • ALPRAZolam (XANAX) 0.25 MG Tab Take 0.25 mg by mouth at bedtime as needed for Sleep.     • fluticasone (FLONASE) 50 MCG/ACT nasal spray Spray 1-2 Sprays in nose 1 time daily as needed (for allergies).     • fexofenadine (ALLEGRA) 180 MG tablet Take 180 mg by mouth every morning.     • hydroCHLOROthiazide (HYDRODIURIL) 25 MG Tab Take 25 mg by mouth every morning.     • gabapentin (NEURONTIN) 300 MG Cap Take 600 mg by mouth 4 times a day.       No current facility-administered medications for this encounter.           Maral Hernandez, Med Ass't

## 2022-03-15 NOTE — PROGRESS NOTES
RADIATION ONCOLOGY FOLLOW-UP    Patient name:  Ina Small    Primary Physician:  HENRRY Earl MRN: [id number]  CSN: [id number]   Referring physician:  No ref. provider found  : 10/28/1933, 88 y.o.     DATE OF SERVICE:   3/15/2022    IDENTIFICATION:   A 88 y.o. female with  Tonsil cancer (HCC)  Staging form: Pharynx - HPV-Mediated Oropharynx, AJCC 8th Edition  - Clinical stage from 2021: Stage I (cT1, cN1, cM0, p16+) - Signed by Bailee CASTANEDA M.D. on 2021  Histopathologic type: Squamous cell carcinoma, NOS  Stage prefix: Initial diagnosis      RADIATION SUMMARY:  [facility] Treatment Information        Some values may be hidden. Unless noted otherwise, only the newest values recorded on each date are displayed.         [facility] Treatment Summary 10/5/21 10/11/21   Course First Treatment Date 2021   Course Last Treatment Date 10/05/2021 10/05/2021   HN Plan from Course [id number]   Fraction 35 of 35     Elapsed Course Days 49 @ [id number]     Prescribed Fraction Dose 200 cGy     Prescribed Total Dose 7,000 cGy     HN cp Reference Point from Course [id number]   Elapsed Course Days 49 @ [id number]     Session Dose --     Total Dose 6,351 cGy     PTV70 Reference Point from Course [id number]   Elapsed Course Days 49 @ [id number]     Session Dose --     Total Dose 7,000 cGy      Some values recorded on this date have been omitted.               HISTORY OF PRESENT ILLNESS:   Subjective    22 87-year-old female with past medical history significant for elevated cholesterol, hypertension, peripheral neuropathy, and cataracts.  She presented to ENT in May for work-up of sinus congestion.  While undergoing work-up she was noted to have abnormal tonsils described as 2+ with a 5 mm papillomatous growth involving the right tonsil.     Removal of the growth was recommended with concern for possible predisposition to cancer.     She underwent bilateral tonsillectomy  7/15/2021.  The right tonsil was negative for malignancy.  The left tonsil demonstrated squamous cell carcinoma.     Follow-up MRI of the neck and soft tissues 7/23/2021 demonstrated a level two jugulodigastric node on the left measuring approximately 2.3 cm.  PET CT scan 7/26/2021 demonstrated bilateral tonsillar uptake considered to be postoperative.  She also had some mild uptake noted in the level two left neck node.  Uptake in the node was not mentioned in the PET/CT report.     Her primary complaint at this time is pharyngitis post tonsillectomy.  She denies otalgia and weight loss.     She has a remote smoking history total 7.5 pack years.  Stopped smoking approximately 50 years ago.     11/18/21 Initial follow-up visit post completion of therapy.  Reports some mild odynophagia left posterior throat.  Is able to eat reports her appetite is not as good as it used to be.  Denies dysphagia.  Xerostomia 6 ( 0-10, 0=completely dry), Taste 6 ( 0-10, 0=no taste), Dysphagia 0, Odynophagia mild        INTERVAL HISTORY:  3/15/2022.  Scheduled follow-up visit.  Overall doing well.  No complaints of dysphagia odynophagia.  Does have some xerostomia and impaired taste.  Getting regular dental visits.  Xerostomia 5 ( 0-10, 0=completely dry), Taste 7 ( 0-10, 0=no taste), Dysphagia 0, Odynophagia 0    CURRENT MEDICATIONS:  Current Outpatient Medications   Medication Sig Dispense Refill   • vitamin D3 (CHOLECALCIFEROL) 1000 Unit (25 mcg) Tab Take 1,000 Units by mouth every day.     • Ibuprofen 200 MG Cap Take  by mouth.     • ALPRAZolam (XANAX) 0.25 MG Tab Take 0.25 mg by mouth at bedtime as needed for Sleep.     • fluticasone (FLONASE) 50 MCG/ACT nasal spray Spray 1-2 Sprays in nose 1 time daily as needed (for allergies).     • fexofenadine (ALLEGRA) 180 MG tablet Take 180 mg by mouth every morning.     • hydroCHLOROthiazide (HYDRODIURIL) 25 MG Tab Take 25 mg by mouth every morning.     • gabapentin (NEURONTIN) 300 MG Cap  Take 600 mg by mouth 4 times a day.       No current facility-administered medications for this encounter.       ALLERGIES:  Shellfish allergy and Garlic    REVIEW OF SYSTEMS:    A complete review of system taken. Pertinent items in HPI.  All other negative.    PHYSICAL EXAM:   PERFORMANCE STATUS:  No flowsheet data found.  Vitals 3/15/2022 1/4/2022 11/18/2021 9/29/2021 9/22/2021 9/15/2021 9/8/2021   SYSTOLIC 144 152 152 143 148 145 147   DIASTOLIC 74 83 76 71 75 80 68   PULSE 102 80 84 91 75 71 73   TEMPERATURE 96.7 96.6 97.8 - - 98.4 -   RESPIRATIONS - 18 - - - - -   WEIGHT 148 150 148.37 152.34 153 152 152.89   HEIGHT - - - - - - -   BMI 23.9 kg/m2 24.22 kg/m2 23.96 kg/m2 24.6 kg/m2 24.71 kg/m2 24.55 kg/m2 24.69 kg/m2   SPO2 91 97 93 92 95 92 96       Physical Exam  Vitals and nursing note reviewed.   Constitutional:       General: She is not in acute distress.     Appearance: She is well-developed.   HENT:      Head: Normocephalic.      Mouth/Throat:      Mouth: Mucous membranes are moist.      Pharynx: Oropharynx is clear. No oropharyngeal exudate or posterior oropharyngeal erythema.   Eyes:      Conjunctiva/sclera: Conjunctivae normal.      Pupils: Pupils are equal, round, and reactive to light.   Cardiovascular:      Rate and Rhythm: Normal rate and regular rhythm.   Pulmonary:      Effort: Pulmonary effort is normal.   Musculoskeletal:         General: Normal range of motion.      Cervical back: Normal range of motion.   Lymphadenopathy:      Cervical: No cervical adenopathy.   Skin:     General: Skin is warm and dry.      Findings: No erythema.   Neurological:      Mental Status: She is alert and oriented to person, place, and time.   Psychiatric:         Behavior: Behavior normal.         Thought Content: Thought content normal.         Judgment: Judgment normal.           FIBEROPTIC EXAM:  Normal exam    LABORATORY DATA:   Lab Results   Component Value Date/Time    WBC 7.0 07/01/2021 01:45 PM    RBC 5.35  07/01/2021 01:45 PM    HEMOGLOBIN 15.4 07/01/2021 01:45 PM    HEMATOCRIT 46.9 07/01/2021 01:45 PM    MCV 87.7 07/01/2021 01:45 PM    MCH 28.8 07/01/2021 01:45 PM    MCHC 32.8 (L) 07/01/2021 01:45 PM    RDW 44.2 07/01/2021 01:45 PM    PLATELETCT 256 07/01/2021 01:45 PM    MPV 11.0 07/01/2021 01:45 PM      Lab Results   Component Value Date/Time    SODIUM 132 (L) 07/01/2021 01:45 PM    POTASSIUM 3.8 07/01/2021 01:45 PM    CHLORIDE 93 (L) 07/01/2021 01:45 PM    CO2 28 07/01/2021 01:45 PM    GLUCOSE 112 (H) 07/01/2021 01:45 PM    BUN 14 07/01/2021 01:45 PM    CREATININE 0.97 07/01/2021 01:45 PM         RADIOLOGY DATA:  CT-MAXILLOFACIAL W/O PLUS RECONS    Result Date: 2/9/2022  Left frontal sinus partially calcified mass is without significant change and 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 Similar lucency in the left maxilla likely is related to remote dental disease      IMPRESSION:    A 88 y.o. with  Tonsil cancer (HCC)  Staging form: Pharynx - HPV-Mediated Oropharynx, AJCC 8th Edition  - Clinical stage from 7/30/2021: Stage I (cT1, cN1, cM0, p16+) - Signed by Bailee CASTANEDA M.D. on 7/30/2021  Histopathologic type: Squamous cell carcinoma, NOS  Stage prefix: Initial diagnosis      CANCER STATUS:  No Evidence of Disease    RECOMMENDATIONS:   Reviewed physical exam and fiberoptic exam findings with patient.  Reassured her.  Will alternate visits with Dr. Ren.  She will see Dr. Ren in April and return here for follow-up in May.      Thank you for the opportunity to participate in her care.  If any questions or comments, please do not hesitate in calling.      No orders of the defined types were placed in this encounter.

## 2022-03-15 NOTE — PROCEDURES
DATE OF SERVICE: 3/15/2022         FIBEROPTIC NASO-PHARYNGOSCOPY NOTE      Flexible fiberoptic exam was performed after anesthesia of left nostril and oropharynx.    Nasopharynx:       R. Eustachian canal opening, torus, fossa of Rosenmuller appear normal  L. Eustachian canal opening, torus, fossa of Rosenmuller appear normal      Oropharynx:        Base of tongue normal appearing.  Vallecula normal appearing.  Epiglottis normal appearing.  PE folds normal appearing.    Larynx:      R. AE fold, false vocal fold, arytenoid appear normal  L. AE fold, false vocal fold, arytenoid appear normal  R. Cord mobile  L. Cord mobile   R. Piriform sinus appears normal  L. Piriform sinus appears normal      Bailee CASTANEDA M.D.  Electronically signed by: Bailee CASTANEDA M.D., 3/15/2022 4:11 PM  [phone]

## 2022-05-12 NOTE — OP THERAPY EVALUATION
Outpatient Speech Therapy  INITIAL EVALUATION    [facility]  [address]  [address]  [place] [address]  Phone:  [phone]  Fax:  [phone]    Date of Evaluation: 2021    Patient: Ina Small  YOB: 1933  MRN: [id number]     Referring Provider: Bailee CASTANEDA M.D.  [address] [facility]  [address]  [place] [address]   Referring Diagnosis Tonsil cancer (HCC) [C09.9]     Time Calculation      11:05am  11:45am  40 minutes           Chief Complaint: Dysphagia (patient with diagnosis of tonsil cancer seeking  support regarding education and exercise addressing swallow function during recently initiated radiotherapy)    Visit Diagnoses     ICD-10-CM   1. Tonsil cancer (HCC)  C09.9   2. Dysphagia, unspecified type  R13.10     Subjective:   Reason for Therapy:     Reason For Evaluation:  Dysphagia (Patient endorses start of radiotherapy 2021 )    Onset Date:  7/15/2021    Onset Description:  Patient is a 87 year old female, referred to outpatient speech therapy in the setting of recent diagnosis of Tonsil cancer (HCC)    Chart review from referring physician  Staging form: Pharynx - HPV-Mediated Oropharynx, AJCC 8th Edition  - Clinical stage from 2021: Stage I (cT1, cN1, cM0, p16+) - Signed by Bailee CASTANEDA M.D. on 2021  Histopathologic type: Squamous cell carcinoma, NOS  Stage prefix: Initial diagnosis    Surgery Date:  7/15/2021    Surgery Description:  Bilateral Tonsillectomy, Bigg Ren M.D  Social Support:     Accompanied By:  Other (second-cousin, Justa Mcfarland, present and supportive)    Patient Mental Status:  Alert and Responsive (Oriented x 4, very pleasant; good historian)  Pain:     Current pain ratin (reported to left inside of throat)  Therapy History:     Previous Diet:  Normal Diet, Normal Consistency and Thin Liquids    Current Diet:  Thin Liquids, Modified Diet and Mechanical Soft Foods    Nutritional Concerns  "Restrictions and Allergies:  Patient endorses that she has slightly modified her diet since completion of tonsillectomy.     Hearing:  No Deficits    Vision:  Eye Glasses    Dentition:  Complete Dentition (dental caps and implants)  Additional Subjective Comments:      Patient endorses she lives with her daughter, Yusra. Patient reports strong family and community support. Patient endorses no changes to swallow, no restrictions to diet at this time, but does endorse that following removal of tonsils she needs to \"pay closer attention\" when swallowing.     Patient endorses that she has a cousin that is a speech pathologist and began swallowing exercise following bilateral tonsillectomy, endorsing completion of swallow exercise \"for about a month now.\"     Past Medical History:   Diagnosis Date   • Arthritis     hands, right shoulder   • Cataract     bilateral IOL   • Cough     clear mucus in AM. Inhaler as needed. Under care of pulmonologist.    • Cystic kidney disease    • High cholesterol     declines taking statins   • Hypertension    • Neuropathy, peripheral    • Other specified disorder of intestines     constipation/abd pain   • Pain     left knee   • Pneumonia 2017   • PPD positive    • SCC (squamous cell carcinoma) 07/15/2021    L tonsil   • Snoring      Past Surgical History:   Procedure Laterality Date   • PB REMOVAL OF TONSILS,12+ Y/O N/A 7/15/2021    Procedure: TONSILLECTOMY.;  Surgeon: Bigg Ren M.D.;  Location: SURGERY SAME DAY [facility];  Service: Ent   • KNEE ARTHROPLASTY TOTAL Left 2/11/2019    Procedure: KNEE ARTHROPLASTY TOTAL;  Surgeon: Sherwin Burdick M.D.;  Location: SURGERY [facility];  Service: Orthopedics   • SHOULDER ARTHROSCOPY Right 2017   • CATARACT EXTRACTION WITH IOL Bilateral 2014   • COLON OVERGROWTH  1/8/2010    Performed by TRACI GARCIA at ENDOSCOPY [facility]   • KNEE ARTHROPLASTY TOTAL Right 2005   • TUBAL LIGATION  1965   • APPENDECTOMY  1957   • " DILATION AND CURETTAGE N/A 1970s    x2     Objective:   Treatments/Interventions Performed:  Patient/Caregiver education  Other Treatment Interventions:  Assessment of patient centered swallow complaints EAT 10/ Swallowing Ability and Function Evaluation (SAFE)  Objective Details:  EAT 10 score: 1/40, which is not consistent with dysphagia (score below 3)    Swallowing Ability and Function Evaluation (SAFE)  1. Physical Examination score 78/ %ile 90/ Stanine 9 Within Normal Limits  2. Oral Phase score 21/ %ile 90/ Stanine 9 Within Normal Limits  3. Pharyngeal Phase 19/ %ile 70/ Stanine 7 MILD    Speech Therapy Assessment:     Speech Mechanism Assessment:     Patient voice description: Clear    Oral Motor Status:     Labial strength and control for patient: Good    Lingual strength and control for patient: Good    Patient saliva management: GoodPatient oral sensation and awareness: Good    Patient awareness of swallow problem: Good    Oral motor status comments: Oral motor assessment revealed complaints of increased dryness in oral cavity and noted thickening of saliva as patient is current in her first week of radiotherapy. Physical examination related to oral motor musculature related to swallow revealed lips, tongue, palate, cheeks, teeth, jaw, larynx and oral reflexes all within functional limits at this time.     Oral Phase Assessment:     Types of food within functional limits: Puree, Mechanical Soft, Regular and Thin Liquid    Oral phase comments: Patient with benefit from liquid wash to assist in swallow with regular solids in the event of new onset of dry mouth. Patient independent in control of bite, sip size with no symptoms of oral dysphagia observed.     Pharyngeal Phase Assessment:     Delayed Swallow    Food types within functional limits: Puree, Mechanical Soft, Regular and Thin Liquid    Pharyngeal phase comments: No clinical signs of aspiration were demonstrated during therapist directed trials.  Patient demonstrated need for second, clearing swallow with dry or consistencies of increased texture (such as regular solids). Patient independently stated use of effortful swallow as necessary to address reported slight changes to swallow following tonsillectomy.     Speech Therapy Plan :   Prognosis & Recommendations  Impression Summary: Ina Small, an 87 year old female, participated in an outpatient speech therapy evaluation of swallow at the kind request of Radiation/ Oncology, Dr. Byrne, due to recent diagnosis of tonsil cancer, s/p tonsillectomy with patient current in week 1 of radiotherapy.    Results of today's evaluation revealed patient presented with oral, pharyngeal phases of swallow within functional limits, with patient demonstrating independence in implementation of effortful swallow as necessary to assist in swallow following slight changes reported tonsillectomy.     Education provided on the goal of outpatient speech therapy through radiotherapy to support best outcomes with regards to swallow function specific to eat and exercise. Education was provided on aspiration, swallow precautions, importance of strict oral and pharyngeal hygiene. Education was also provided in swallow exercise to continue or initiate in the setting of radiotherapy to promote maintenance and safety in swallow including: CTAR, Mendelsohn exercise, stretch exercise, Supraglottic swallow, pitch glide /ee/, Chen exercise, effortful swallow and modified gargle. Additional stretching exercise educated to chin to shoulder, ear to shoulder. Additional education and practice in exercise recommended as patient progresses through treatment.    Discussion was completed regarding PEG tube placement during radiotherapy in the event of need for alternative means of nutrition and hydration. Patient provided with outline of risks/ benefits with patient stating her goal is to maintain oral intake as long as possible and discuss  "need for alternative means of nutrition hydration \"as that becomes necessary.\"   Prognosis:  Good  Prognosis Details:  Patient would likely benefit from participation in direct, outpatient speech therapy to address changes to swallow that occur as a result of radiatiotherapy to provide further education/ training and support to maintain swallow integrity and promote return to PO/ swallow function to previous level of swallow following completion of radiotherapy.   Compensatory swallow strategies:  Upright position 90 degrees during meal and 45 minutes after meal, Reduce bolus size, Multiple swallows and Alternate liquids/solids (Effortful swallow)  Diet Recommendation:  Mechanical Soft Foods (minced and moist, slippery and slurry solids)  Liquid Recommendation:  Thin  Goals  Short Term Goals:  1.) Patient will demonstrate independence in completion of swallow exercises designed to maintain function of oral, pharyngeal phases of swallow completing as part of home program 7/7 days, independent.  2.) Patient will state aspiration and swallow precautions, including strict oral hygiene, completing independent 7/7 days.  3.) Patient will understand and state compensatory swallow strategies and modify diet textures as/ when necessary to promote safety in swallow function and maintain PO while undergoing chemoradiation therapy.     Short Term Goal Duration (Weeks):  6-8 weeks  Long Term Goals:  1.) Patient will state understanding to changes in swallow as a result of radiotherapy and implement compensatory swallow strategies and diet texture modifications as necessary to reduce risk of aspiration/ penetration/ choking maintaining PO intake for primary nutrition needs.      Long Term Goal Duration (Weeks):  1-2 months  Patient Stated Goal:  \"To go to my grandsons wedding on October 8th\"  Therapy Recommendations  Recommendation:  Individual Speech Therapy and Modified Barium Swallow Study, Referring physician may wish to " consider MBS in the setting of reported changes to swallow following tonsillectomy  Planned Therapy Interventions:  Home Program, Compensatory Strategy Training, Patient/Caregiver Education and Dysphagia treatment,   Plan Details:  10 visits [id number]    Patient plan of care will be modified according with patient needs as patient progresses through radiotherapy treatment and need for direct, outpatient speech therapy intervention to support safety in swallow  Frequency:  1x week  Duration (in visits):  10  Duration (in weeks):  10      Functional Assessment Used  EAT 10  Swallowing Ability and Function Evaluation (SAFE)    Referring provider co-signature:  I have reviewed this plan of care and my co-signature certifies the need for services.    Certification Period: 08/26/2021 to  11/05/21    Physician Signature: ________________________________ Date: ______________           Alar Island Pedicle Flap Text: The defect edges were debeveled with a #15 scalpel blade.  Given the location of the defect, shape of the defect and the proximity to the alar rim an island pedicle advancement flap was deemed most appropriate.  Using a sterile surgical marker, an appropriate advancement flap was drawn incorporating the defect, outlining the appropriate donor tissue and placing the expected incisions within the nasal ala running parallel to the alar rim. The area thus outlined was incised with a #15 scalpel blade.  The skin margins were undermined minimally to an appropriate distance in all directions around the primary defect and laterally outward around the island pedicle utilizing iris scissors.  There was minimal undermining beneath the pedicle flap.

## 2022-05-20 ENCOUNTER — HOSPITAL ENCOUNTER (OUTPATIENT)
Dept: RADIATION ONCOLOGY | Facility: MEDICAL CENTER | Age: 87
End: 2022-05-31
Attending: RADIOLOGY
Payer: MEDICARE

## 2022-05-20 VITALS
SYSTOLIC BLOOD PRESSURE: 136 MMHG | WEIGHT: 150 LBS | BODY MASS INDEX: 24.22 KG/M2 | OXYGEN SATURATION: 97 % | TEMPERATURE: 98.1 F | DIASTOLIC BLOOD PRESSURE: 80 MMHG | RESPIRATION RATE: 16 BRPM | HEART RATE: 71 BPM

## 2022-05-20 PROCEDURE — 99213 OFFICE O/P EST LOW 20 MIN: CPT | Mod: 25 | Performed by: RADIOLOGY

## 2022-05-20 PROCEDURE — 31575 DIAGNOSTIC LARYNGOSCOPY: CPT | Performed by: RADIOLOGY

## 2022-05-20 RX ORDER — ROPINIROLE 0.5 MG/1
0.5 TABLET, FILM COATED ORAL 3 TIMES DAILY
COMMUNITY

## 2022-05-20 ASSESSMENT — PAIN SCALES - GENERAL: PAINLEVEL: NO PAIN

## 2022-05-20 NOTE — PROGRESS NOTES
RADIATION ONCOLOGY FOLLOW-UP    Patient name:  Ina Small    Primary Physician:  HENRRY Earl MRN: [id number]  [facility]: [id number]   Referring physician:  Bigg Ren M.D.  : 10/28/1933, 88 y.o.     DATE OF SERVICE:   2022    IDENTIFICATION:   A 88 y.o. female with  Tonsil cancer (HCC)  Staging form: Pharynx - HPV-Mediated Oropharynx, AJCC 8th Edition  - Clinical stage from 2021: Stage I (cT1, cN1, cM0, p16+) - Signed by Bailee CASTANEDA M.D. on 2021  Histopathologic type: Squamous cell carcinoma, NOS  Stage prefix: Initial diagnosis      RADIATION SUMMARY:  Aria Treatment Information        Some values may be hidden. Unless noted otherwise, only the newest values recorded on each date are displayed.         Aria Treatment Summary 10/5/21 10/11/21   Course First Treatment Date 2021   Course Last Treatment Date 10/05/2021 10/05/2021   HN Plan from Course [id number]   Fraction 35 of 35     Elapsed Course Days 49 @ [id number]     Prescribed Fraction Dose 200 cGy     Prescribed Total Dose 7,000 cGy     HN cp Reference Point from Course [id number]   Elapsed Course Days 49 @ [id number]     Session Dose --     Total Dose 6,351 cGy     PTV70 Reference Point from Course [id number]   Elapsed Course Days 49 @ [id number]     Session Dose --     Total Dose 7,000 cGy      Some values recorded on this date have been omitted.               HISTORY OF PRESENT ILLNESS:   Subjective    22 87-year-old female with past medical history significant for elevated cholesterol, hypertension, peripheral neuropathy, and cataracts.  She presented to ENT in May for work-up of sinus congestion.  While undergoing work-up she was noted to have abnormal tonsils described as 2+ with a 5 mm papillomatous growth involving the right tonsil.     Removal of the growth was recommended with concern for possible predisposition to cancer.     She underwent bilateral tonsillectomy  7/15/2021.  The right tonsil was negative for malignancy.  The left tonsil demonstrated squamous cell carcinoma.     Follow-up MRI of the neck and soft tissues 7/23/2021 demonstrated a level two jugulodigastric node on the left measuring approximately 2.3 cm.  PET CT scan 7/26/2021 demonstrated bilateral tonsillar uptake considered to be postoperative.  She also had some mild uptake noted in the level two left neck node.  Uptake in the node was not mentioned in the PET/CT report.     Her primary complaint at this time is pharyngitis post tonsillectomy.  She denies otalgia and weight loss.     She has a remote smoking history total 7.5 pack years.  Stopped smoking approximately 50 years ago.     11/18/21 Initial follow-up visit post completion of therapy.  Reports some mild odynophagia left posterior throat.  Is able to eat reports her appetite is not as good as it used to be.  Denies dysphagia.  Xerostomia 6 ( 0-10, 0=completely dry), Taste 6 ( 0-10, 0=no taste), Dysphagia 0, Odynophagia mild    3/15/2022.  Scheduled follow-up visit.  Overall doing well.  No complaints of dysphagia odynophagia.  Does have some xerostomia and impaired taste.  Getting regular dental visits.  Xerostomia 5 ( 0-10, 0=completely dry), Taste 7 ( 0-10, 0=no taste), Dysphagia 0, Odynophagia 0      INTERVAL HISTORY:  5/20/2022.  Scheduled follow-up visit.  Overall doing well.  No complaints of dysphagia odynophagia.  Reports her xerostomia and taste are improving.  Had 1 dental implant in her upper arch without problems.  Xerostomia 5 ( 0-10, 0=completely dry), Taste 7 ( 0-10, 0=no taste), Dysphagia 0, Odynophagia 0    CURRENT MEDICATIONS:  Current Outpatient Medications   Medication Sig Dispense Refill   • ROPINIRole (REQUIP) 0.5 MG Tab Take 0.5 mg by mouth 3 times a day.     • vitamin D3 (CHOLECALCIFEROL) 1000 Unit (25 mcg) Tab Take 1,000 Units by mouth every day.     • Ibuprofen 200 MG Cap Take  by mouth.     • fluticasone (FLONASE) 50  MCG/ACT nasal spray Spray 1-2 Sprays in nose 1 time daily as needed (for allergies).     • fexofenadine (ALLEGRA) 180 MG tablet Take 180 mg by mouth every morning.     • hydroCHLOROthiazide (HYDRODIURIL) 25 MG Tab Take 25 mg by mouth every morning.     • gabapentin (NEURONTIN) 300 MG Cap Take 600 mg by mouth 4 times a day.     • ALPRAZolam (XANAX) 0.25 MG Tab Take 0.25 mg by mouth at bedtime as needed for Sleep. (Patient not taking: Reported on 5/20/2022)       No current facility-administered medications for this encounter.       ALLERGIES:  Shellfish allergy and Garlic    REVIEW OF SYSTEMS:    A complete review of system taken. Pertinent items in HPI.  All other negative.    PHYSICAL EXAM:   PERFORMANCE STATUS:  No flowsheet data found.  Vitals 5/20/2022 3/15/2022 1/4/2022 11/18/2021 9/29/2021 9/22/2021 9/15/2021   SYSTOLIC 136 144 152 152 143 148 145   DIASTOLIC 80 74 83 76 71 75 80   PULSE 71 102 80 84 91 75 71   TEMPERATURE 98.1 96.7 96.6 97.8 - - 98.4   RESPIRATIONS 16 - 18 - - - -   WEIGHT 150 148 150 148.37 152.34 153 152   HEIGHT - - - - - - -   BMI 24.22 kg/m2 23.9 kg/m2 24.22 kg/m2 23.96 kg/m2 24.6 kg/m2 24.71 kg/m2 24.55 kg/m2   SPO2 97 91 97 93 92 95 92       Physical Exam  Vitals and nursing note reviewed.   Constitutional:       General: She is not in acute distress.     Appearance: She is well-developed.   HENT:      Head: Normocephalic.      Mouth/Throat:      Mouth: Mucous membranes are moist.      Pharynx: Oropharynx is clear. No oropharyngeal exudate or posterior oropharyngeal erythema.   Eyes:      Conjunctiva/sclera: Conjunctivae normal.      Pupils: Pupils are equal, round, and reactive to light.   Cardiovascular:      Rate and Rhythm: Normal rate and regular rhythm.   Pulmonary:      Effort: Pulmonary effort is normal.   Musculoskeletal:         General: Normal range of motion.      Cervical back: Normal range of motion.   Lymphadenopathy:      Cervical: No cervical adenopathy.   Skin:      General: Skin is warm and dry.      Findings: No erythema.   Neurological:      Mental Status: She is alert and oriented to person, place, and time.   Psychiatric:         Behavior: Behavior normal.         Thought Content: Thought content normal.         Judgment: Judgment normal.           FIBEROPTIC EXAM:  Normal exam no visible tumor    LABORATORY DATA:   Lab Results   Component Value Date/Time    WBC 7.0 07/01/2021 01:45 PM    RBC 5.35 07/01/2021 01:45 PM    HEMOGLOBIN 15.4 07/01/2021 01:45 PM    HEMATOCRIT 46.9 07/01/2021 01:45 PM    MCV 87.7 07/01/2021 01:45 PM    MCH 28.8 07/01/2021 01:45 PM    MCHC 32.8 (L) 07/01/2021 01:45 PM    RDW 44.2 07/01/2021 01:45 PM    PLATELETCT 256 07/01/2021 01:45 PM    MPV 11.0 07/01/2021 01:45 PM      Lab Results   Component Value Date/Time    SODIUM 132 (L) 07/01/2021 01:45 PM    POTASSIUM 3.8 07/01/2021 01:45 PM    CHLORIDE 93 (L) 07/01/2021 01:45 PM    CO2 28 07/01/2021 01:45 PM    GLUCOSE 112 (H) 07/01/2021 01:45 PM    BUN 14 07/01/2021 01:45 PM    CREATININE 0.97 07/01/2021 01:45 PM         RADIOLOGY DATA:  No results found.    IMPRESSION:    A 88 y.o. with  Tonsil cancer (HCC)  Staging form: Pharynx - HPV-Mediated Oropharynx, AJCC 8th Edition  - Clinical stage from 7/30/2021: Stage I (cT1, cN1, cM0, p16+) - Signed by Bailee CASTANEDA M.D. on 7/30/2021  Histopathologic type: Squamous cell carcinoma, NOS  Stage prefix: Initial diagnosis      CANCER STATUS:  No Evidence of Disease    RECOMMENDATIONS:   Reviewed fiberoptic exam and physical exam findings with patient.  Reassured her.  Recommended continuing alternating visits between myself and Dr. Ren.  She will return here for follow-up in July.    Thank you for the opportunity to participate in her care.  If any questions or comments, please do not hesitate in calling.      Orders Placed This Encounter   • ROPINIRole (REQUIP) 0.5 MG Tab

## 2022-05-20 NOTE — PROCEDURES
DATE OF SERVICE: 5/20/2022         FIBEROPTIC NASO-PHARYNGOSCOPY NOTE      Flexible fiberoptic exam was performed after anesthesia of left nostril and oropharynx.    Nasopharynx:       R. Eustachian canal opening, torus, fossa of Rosenmuller appear normal  L. Eustachian canal opening, torus, fossa of Rosenmuller appear normal      Oropharynx:        Base of tongue normal appearing.  Vallecula normal appearing.  Epiglottis normal appearing.  PE folds normal appearing.    Larynx:      R. AE fold, false vocal fold, arytenoid appear normal  L. AE fold, false vocal fold, arytenoid appear normal  R. Cord mobile  L. Cord mobile   R. Piriform sinus appears normal  L. Piriform sinus appears normal      Bailee CASTANEDA M.D.  Electronically signed by: Bailee CASTANEDA M.D., 5/20/2022 2:34 PM  [phone]

## 2022-05-20 NOTE — PROGRESS NOTES
RADIATION ONCOLOGY FOLLOW-UP    Patient name:  Ina Small    Primary Physician:  HENRRY Earl MRN: [id number]  [facility]: [id number]   Referring physician:  Bigg Ren M.D.  : 10/28/1933, 88 y.o.     DATE OF SERVICE:   2022    IDENTIFICATION:   A 88 y.o. female with  Tonsil cancer (HCC)  Staging form: Pharynx - HPV-Mediated Oropharynx, AJCC 8th Edition  - Clinical stage from 2021: Stage I (cT1, cN1, cM0, p16+) - Signed by Bailee CASTANEDA M.D. on 2021  Histopathologic type: Squamous cell carcinoma, NOS  Stage prefix: Initial diagnosis      RADIATION SUMMARY:  Aria Treatment Information        Some values may be hidden. Unless noted otherwise, only the newest values recorded on each date are displayed.         Aria Treatment Summary 10/5/21 10/11/21   Course First Treatment Date 2021   Course Last Treatment Date 10/05/2021 10/05/2021   HN Plan from Course [id number]   Fraction 35 of 35     Elapsed Course Days 49 @ [id number]     Prescribed Fraction Dose 200 cGy     Prescribed Total Dose 7,000 cGy     HN cp Reference Point from Course [id number]   Elapsed Course Days 49 @ [id number]     Session Dose --     Total Dose 6,351 cGy     PTV70 Reference Point from Course [id number]   Elapsed Course Days 49 @ [id number]     Session Dose --     Total Dose 7,000 cGy      Some values recorded on this date have been omitted.               HISTORY OF PRESENT ILLNESS:   Subjective    22 87-year-old female with past medical history significant for elevated cholesterol, hypertension, peripheral neuropathy, and cataracts.  She presented to ENT in May for work-up of sinus congestion.  While undergoing work-up she was noted to have abnormal tonsils described as 2+ with a 5 mm papillomatous growth involving the right tonsil.     Removal of the growth was recommended with concern for possible predisposition to cancer.     She underwent bilateral tonsillectomy  7/15/2021.  The right tonsil was negative for malignancy.  The left tonsil demonstrated squamous cell carcinoma.     Follow-up MRI of the neck and soft tissues 7/23/2021 demonstrated a level two jugulodigastric node on the left measuring approximately 2.3 cm.  PET CT scan 7/26/2021 demonstrated bilateral tonsillar uptake considered to be postoperative.  She also had some mild uptake noted in the level two left neck node.  Uptake in the node was not mentioned in the PET/CT report.     Her primary complaint at this time is pharyngitis post tonsillectomy.  She denies otalgia and weight loss.     She has a remote smoking history total 7.5 pack years.  Stopped smoking approximately 50 years ago.     11/18/21 Initial follow-up visit post completion of therapy.  Reports some mild odynophagia left posterior throat.  Is able to eat reports her appetite is not as good as it used to be.  Denies dysphagia.  Xerostomia 6 ( 0-10, 0=completely dry), Taste 6 ( 0-10, 0=no taste), Dysphagia 0, Odynophagia mild    3/15/2022.  Scheduled follow-up visit.  Overall doing well.  No complaints of dysphagia odynophagia.  Does have some xerostomia and impaired taste.  Getting regular dental visits.  Xerostomia 5 ( 0-10, 0=completely dry), Taste 7 ( 0-10, 0=no taste), Dysphagia 0, Odynophagia 0        INTERVAL HISTORY:  ***  Xerostomia *** ( 0-10, 0=completely dry), Taste *** ( 0-10, 0=no taste), Dysphagia ***, Odynophagia ***    CURRENT MEDICATIONS:  Current Outpatient Medications   Medication Sig Dispense Refill   • ROPINIRole (REQUIP) 0.5 MG Tab Take 0.5 mg by mouth 3 times a day.     • vitamin D3 (CHOLECALCIFEROL) 1000 Unit (25 mcg) Tab Take 1,000 Units by mouth every day.     • Ibuprofen 200 MG Cap Take  by mouth.     • fluticasone (FLONASE) 50 MCG/ACT nasal spray Spray 1-2 Sprays in nose 1 time daily as needed (for allergies).     • fexofenadine (ALLEGRA) 180 MG tablet Take 180 mg by mouth every morning.     • hydroCHLOROthiazide  (HYDRODIURIL) 25 MG Tab Take 25 mg by mouth every morning.     • gabapentin (NEURONTIN) 300 MG Cap Take 600 mg by mouth 4 times a day.     • ALPRAZolam (XANAX) 0.25 MG Tab Take 0.25 mg by mouth at bedtime as needed for Sleep. (Patient not taking: Reported on 5/20/2022)       No current facility-administered medications for this encounter.       ALLERGIES:  Shellfish allergy and Garlic    REVIEW OF SYSTEMS:    A complete review of system taken. Pertinent items in HPI.  All other negative.    PHYSICAL EXAM:   PERFORMANCE STATUS:  No flowsheet data found.  Vitals 5/20/2022 3/15/2022 1/4/2022 11/18/2021 9/29/2021 9/22/2021 9/15/2021   SYSTOLIC 136 144 152 152 143 148 145   DIASTOLIC 80 74 83 76 71 75 80   PULSE 71 102 80 84 91 75 71   TEMPERATURE 98.1 96.7 96.6 97.8 - - 98.4   RESPIRATIONS 16 - 18 - - - -   WEIGHT 150 148 150 148.37 152.34 153 152   HEIGHT - - - - - - -   BMI 24.22 kg/m2 23.9 kg/m2 24.22 kg/m2 23.96 kg/m2 24.6 kg/m2 24.71 kg/m2 24.55 kg/m2   SPO2 97 91 97 93 92 95 92       Physical Exam      FIBEROPTIC EXAM:  ***    LABORATORY DATA:   Lab Results   Component Value Date/Time    WBC 7.0 07/01/2021 01:45 PM    RBC 5.35 07/01/2021 01:45 PM    HEMOGLOBIN 15.4 07/01/2021 01:45 PM    HEMATOCRIT 46.9 07/01/2021 01:45 PM    MCV 87.7 07/01/2021 01:45 PM    MCH 28.8 07/01/2021 01:45 PM    MCHC 32.8 (L) 07/01/2021 01:45 PM    RDW 44.2 07/01/2021 01:45 PM    PLATELETCT 256 07/01/2021 01:45 PM    MPV 11.0 07/01/2021 01:45 PM      Lab Results   Component Value Date/Time    SODIUM 132 (L) 07/01/2021 01:45 PM    POTASSIUM 3.8 07/01/2021 01:45 PM    CHLORIDE 93 (L) 07/01/2021 01:45 PM    CO2 28 07/01/2021 01:45 PM    GLUCOSE 112 (H) 07/01/2021 01:45 PM    BUN 14 07/01/2021 01:45 PM    CREATININE 0.97 07/01/2021 01:45 PM         RADIOLOGY DATA:  No results found.    IMPRESSION:    A 88 y.o. with  Tonsil cancer (HCC)  Staging form: Pharynx - HPV-Mediated Oropharynx, AJCC 8th Edition  - Clinical stage from 7/30/2021: Stage  I (cT1, cN1, cM0, p16+) - Signed by Bailee CASTANEDA M.D. on 7/30/2021  Histopathologic type: Squamous cell carcinoma, NOS  Stage prefix: Initial diagnosis      CANCER STATUS:  {Cancer Status:17395}    RECOMMENDATIONS:   ***      Thank you for the opportunity to participate in her care.  If any questions or comments, please do not hesitate in calling.      Orders Placed This Encounter   • ROPINIRole (REQUIP) 0.5 MG Tab

## 2022-05-20 NOTE — NON-PROVIDER
Patient was seen today in clinic with Dr. Byrne for follow up.  Vitals signs and weight were obtained and pain assessment was completed.  Allergies and medications were reviewed with the patient.     Vitals/Pain:  Vitals:    05/20/22 1408   BP: 136/80   Pulse: 71   Resp: 16   Temp: 36.7 °C (98.1 °F)   SpO2: 97%   Weight: 68 kg (150 lb)   Pain Score: No pain        Allergies:   Shellfish allergy and Garlic    Current Medications:  Current Outpatient Medications   Medication Sig Dispense Refill   • ROPINIRole (REQUIP) 0.5 MG Tab Take 0.5 mg by mouth 3 times a day.     • vitamin D3 (CHOLECALCIFEROL) 1000 Unit (25 mcg) Tab Take 1,000 Units by mouth every day.     • Ibuprofen 200 MG Cap Take  by mouth.     • fluticasone (FLONASE) 50 MCG/ACT nasal spray Spray 1-2 Sprays in nose 1 time daily as needed (for allergies).     • fexofenadine (ALLEGRA) 180 MG tablet Take 180 mg by mouth every morning.     • hydroCHLOROthiazide (HYDRODIURIL) 25 MG Tab Take 25 mg by mouth every morning.     • gabapentin (NEURONTIN) 300 MG Cap Take 600 mg by mouth 4 times a day.     • ALPRAZolam (XANAX) 0.25 MG Tab Take 0.25 mg by mouth at bedtime as needed for Sleep. (Patient not taking: Reported on 5/20/2022)       No current facility-administered medications for this encounter.         PCP:  Maxwell Tejeda R.N.

## 2022-07-21 ENCOUNTER — HOSPITAL ENCOUNTER (OUTPATIENT)
Dept: RADIATION ONCOLOGY | Facility: MEDICAL CENTER | Age: 87
End: 2022-07-31
Attending: RADIOLOGY
Payer: MEDICARE

## 2022-07-21 VITALS
HEART RATE: 82 BPM | SYSTOLIC BLOOD PRESSURE: 138 MMHG | BODY MASS INDEX: 24.81 KG/M2 | DIASTOLIC BLOOD PRESSURE: 82 MMHG | TEMPERATURE: 97.4 F | OXYGEN SATURATION: 94 % | WEIGHT: 153.66 LBS

## 2022-07-21 PROCEDURE — 31575 DIAGNOSTIC LARYNGOSCOPY: CPT | Performed by: RADIOLOGY

## 2022-07-21 PROCEDURE — 99212 OFFICE O/P EST SF 10 MIN: CPT | Mod: 25 | Performed by: RADIOLOGY

## 2022-07-21 PROCEDURE — 99213 OFFICE O/P EST LOW 20 MIN: CPT | Mod: 25 | Performed by: RADIOLOGY

## 2022-07-21 ASSESSMENT — PAIN SCALES - GENERAL: PAINLEVEL: NO PAIN

## 2022-07-21 NOTE — PROCEDURES
DATE OF SERVICE: 7/21/2022         FIBEROPTIC NASO-PHARYNGOSCOPY NOTE      Flexible fiberoptic exam was performed after anesthesia of left nostril and oropharynx.    Nasopharynx:       R. Eustachian canal opening, torus, fossa of Rosenmuller appear normal  L. Eustachian canal opening, torus, fossa of Rosenmuller appear normal      Oropharynx:        Base of tongue normal appearing.  Vallecula normal appearing.  Epiglottis normal appearing.  PE folds normal appearing.    Larynx:      R. AE fold, false vocal fold, arytenoid appear normal  L. AE fold, false vocal fold, arytenoid appear normal  R. Cord mobile  L. Cord mobile   R. Piriform sinus appears normal  L. Piriform sinus appears normal      Bailee CASTANEDA M.D.  Electronically signed by: Bailee CASTANEDA M.D., 7/21/2022 2:59 PM  [phone]

## 2022-07-21 NOTE — PROGRESS NOTES
RADIATION ONCOLOGY FOLLOW-UP    Patient name:  Ina Small    Primary Physician:  HENRRY Earl MRN: [id number]  [facility]: [id number]   Referring physician:  Bigg Ren M.D.  : 10/28/1933, 88 y.o.     DATE OF SERVICE:   2022    IDENTIFICATION:   A 88 y.o. female with  Tonsil cancer (HCC)  Staging form: Pharynx - HPV-Mediated Oropharynx, AJCC 8th Edition  - Clinical stage from 2021: Stage I (cT1, cN1, cM0, p16+) - Signed by Bailee CASTANEDA M.D. on 2021  Histopathologic type: Squamous cell carcinoma, NOS  Stage prefix: Initial diagnosis      RADIATION SUMMARY:  Aria Treatment Information        Some values may be hidden. Unless noted otherwise, only the newest values recorded on each date are displayed.         Aria Treatment Summary 10/5/21 10/11/21   Course First Treatment Date 2021   Course Last Treatment Date 10/05/2021 10/05/2021   HN Plan from Course [id number]   Fraction 35 of 35     Elapsed Course Days 49 @ [id number]     Prescribed Fraction Dose 200 cGy     Prescribed Total Dose 7,000 cGy     HN cp Reference Point from Course [id number]   Elapsed Course Days 49 @ [id number]     Session Dose --     Total Dose 6,351 cGy     PTV70 Reference Point from Course [id number]   Elapsed Course Days 49 @ [id number]     Session Dose --     Total Dose 7,000 cGy      Some values recorded on this date have been omitted.               HISTORY OF PRESENT ILLNESS:   Subjective    22 87-year-old female with past medical history significant for elevated cholesterol, hypertension, peripheral neuropathy, and cataracts.  She presented to ENT in May for work-up of sinus congestion.  While undergoing work-up she was noted to have abnormal tonsils described as 2+ with a 5 mm papillomatous growth involving the right tonsil.     Removal of the growth was recommended with concern for possible predisposition to cancer.     She underwent bilateral tonsillectomy  7/15/2021.  The right tonsil was negative for malignancy.  The left tonsil demonstrated squamous cell carcinoma.     Follow-up MRI of the neck and soft tissues 7/23/2021 demonstrated a level two jugulodigastric node on the left measuring approximately 2.3 cm.  PET CT scan 7/26/2021 demonstrated bilateral tonsillar uptake considered to be postoperative.  She also had some mild uptake noted in the level two left neck node.  Uptake in the node was not mentioned in the PET/CT report.     Her primary complaint at this time is pharyngitis post tonsillectomy.  She denies otalgia and weight loss.     She has a remote smoking history total 7.5 pack years.  Stopped smoking approximately 50 years ago.     11/18/21 Initial follow-up visit post completion of therapy.  Reports some mild odynophagia left posterior throat.  Is able to eat reports her appetite is not as good as it used to be.  Denies dysphagia.  Xerostomia 6 ( 0-10, 0=completely dry), Taste 6 ( 0-10, 0=no taste), Dysphagia 0, Odynophagia mild    3/15/2022.  Scheduled follow-up visit.  Overall doing well.  No complaints of dysphagia odynophagia.  Does have some xerostomia and impaired taste.  Getting regular dental visits.  Xerostomia 5 ( 0-10, 0=completely dry), Taste 7 ( 0-10, 0=no taste), Dysphagia 0, Odynophagia 0    5/20/2022.  Scheduled follow-up visit.  Overall doing well.  No complaints of dysphagia odynophagia.  Reports her xerostomia and taste are improving.  Had 1 dental implant in her upper arch without problems.  Xerostomia 5 ( 0-10, 0=completely dry), Taste 7 ( 0-10, 0=no taste), Dysphagia 0, Odynophagia 0        INTERVAL HISTORY:  7/21/2022.  Scheduled follow-up visit.  Continues to do well.  Stabilization and taste and xerostomia.  No dysphagia or odynophagia.  Getting her teeth capped.  Xerostomia 5  ( 0-10, 0=completely dry), Taste 7-8 ( 0-10, 0=no taste), Dysphagia 0, Odynophagia 0    CURRENT MEDICATIONS:  Current Outpatient Medications    Medication Sig Dispense Refill   • B Complex-C (VITAMIN B + C COMPLEX PO)      • ROPINIRole (REQUIP) 0.5 MG Tab Take 0.5 mg by mouth 3 times a day.     • vitamin D3 (CHOLECALCIFEROL) 1000 Unit (25 mcg) Tab Take 1,000 Units by mouth every day.     • Ibuprofen 200 MG Cap Take  by mouth.     • fluticasone (FLONASE) 50 MCG/ACT nasal spray Spray 1-2 Sprays in nose 1 time daily as needed (for allergies).     • fexofenadine (ALLEGRA) 180 MG tablet Take 180 mg by mouth every morning.     • hydroCHLOROthiazide (HYDRODIURIL) 25 MG Tab Take 25 mg by mouth every morning.     • gabapentin (NEURONTIN) 300 MG Cap Take 600 mg by mouth 4 times a day.     • ALPRAZolam (XANAX) 0.25 MG Tab Take 0.25 mg by mouth at bedtime as needed for Sleep.       No current facility-administered medications for this encounter.       ALLERGIES:  Shellfish allergy and Garlic    REVIEW OF SYSTEMS:    A complete review of system taken. Pertinent items in HPI.  All other negative.    PHYSICAL EXAM:   PERFORMANCE STATUS:  No flowsheet data found.  Vitals 7/21/2022 5/20/2022 3/15/2022 1/4/2022 11/18/2021 9/29/2021 9/22/2021   SYSTOLIC 138 136 144 152 152 143 148   DIASTOLIC 82 80 74 83 76 71 75   PULSE 82 71 102 80 84 91 75   TEMPERATURE 97.4 98.1 96.7 96.6 97.8 - -   RESPIRATIONS - 16 - 18 - - -   WEIGHT 153.66 150 148 150 148.37 152.34 153   HEIGHT - - - - - - -   BMI 24.81 kg/m2 24.22 kg/m2 23.9 kg/m2 24.22 kg/m2 23.96 kg/m2 24.6 kg/m2 24.71 kg/m2   SPO2 94 97 91 97 93 92 95       Physical Exam  Vitals and nursing note reviewed.   Constitutional:       General: She is not in acute distress.     Appearance: She is well-developed.   HENT:      Head: Normocephalic.      Mouth/Throat:      Mouth: Mucous membranes are moist.      Pharynx: Oropharynx is clear. No oropharyngeal exudate or posterior oropharyngeal erythema.   Eyes:      Conjunctiva/sclera: Conjunctivae normal.      Pupils: Pupils are equal, round, and reactive to light.   Cardiovascular:       Rate and Rhythm: Normal rate and regular rhythm.   Pulmonary:      Effort: Pulmonary effort is normal.   Musculoskeletal:         General: Normal range of motion.      Cervical back: Normal range of motion.   Lymphadenopathy:      Cervical: No cervical adenopathy.   Skin:     General: Skin is warm and dry.      Findings: No erythema.   Neurological:      Mental Status: She is alert and oriented to person, place, and time.   Psychiatric:         Behavior: Behavior normal.         Thought Content: Thought content normal.         Judgment: Judgment normal.           FIBEROPTIC EXAM:  Normal exam    LABORATORY DATA:   Lab Results   Component Value Date/Time    WBC 7.0 07/01/2021 01:45 PM    RBC 5.35 07/01/2021 01:45 PM    HEMOGLOBIN 15.4 07/01/2021 01:45 PM    HEMATOCRIT 46.9 07/01/2021 01:45 PM    MCV 87.7 07/01/2021 01:45 PM    MCH 28.8 07/01/2021 01:45 PM    MCHC 32.8 (L) 07/01/2021 01:45 PM    RDW 44.2 07/01/2021 01:45 PM    PLATELETCT 256 07/01/2021 01:45 PM    MPV 11.0 07/01/2021 01:45 PM      Lab Results   Component Value Date/Time    SODIUM 132 (L) 07/01/2021 01:45 PM    POTASSIUM 3.8 07/01/2021 01:45 PM    CHLORIDE 93 (L) 07/01/2021 01:45 PM    CO2 28 07/01/2021 01:45 PM    GLUCOSE 112 (H) 07/01/2021 01:45 PM    BUN 14 07/01/2021 01:45 PM    CREATININE 0.97 07/01/2021 01:45 PM         RADIOLOGY DATA:  No results found.    IMPRESSION:    A 88 y.o. with  Tonsil cancer (HCC)  Staging form: Pharynx - HPV-Mediated Oropharynx, AJCC 8th Edition  - Clinical stage from 7/30/2021: Stage I (cT1, cN1, cM0, p16+) - Signed by Bailee CASTANEDA M.D. on 7/30/2021  Histopathologic type: Squamous cell carcinoma, NOS  Stage prefix: Initial diagnosis      CANCER STATUS:  No Evidence of Disease    RECOMMENDATIONS:   Reviewed exam findings with patient.  Reassured her.  Continue alternating visits between myself and Dr. Escobar.  She will return here for follow-up in September.      Thank you for the opportunity to participate in her  care.  If any questions or comments, please do not hesitate in calling.      Orders Placed This Encounter   • B Complex-C (VITAMIN B + C COMPLEX PO)

## 2022-09-13 ENCOUNTER — HOSPITAL ENCOUNTER (OUTPATIENT)
Dept: RADIATION ONCOLOGY | Facility: MEDICAL CENTER | Age: 87
End: 2022-09-30
Attending: RADIOLOGY
Payer: MEDICARE

## 2022-09-13 VITALS
SYSTOLIC BLOOD PRESSURE: 146 MMHG | OXYGEN SATURATION: 93 % | WEIGHT: 153 LBS | HEART RATE: 64 BPM | RESPIRATION RATE: 18 BRPM | DIASTOLIC BLOOD PRESSURE: 99 MMHG | BODY MASS INDEX: 24.71 KG/M2 | TEMPERATURE: 97 F

## 2022-09-13 PROCEDURE — 31575 DIAGNOSTIC LARYNGOSCOPY: CPT | Performed by: RADIOLOGY

## 2022-09-13 PROCEDURE — 99212 OFFICE O/P EST SF 10 MIN: CPT | Mod: 25 | Performed by: RADIOLOGY

## 2022-09-13 PROCEDURE — 99213 OFFICE O/P EST LOW 20 MIN: CPT | Mod: 25 | Performed by: RADIOLOGY

## 2022-09-13 ASSESSMENT — PAIN SCALES - GENERAL: PAINLEVEL: NO PAIN

## 2022-09-13 NOTE — PROCEDURES
DATE OF SERVICE: 9/13/2022         FIBEROPTIC NASO-PHARYNGOSCOPY NOTE      Flexible fiberoptic exam was performed after anesthesia of left nostril and oropharynx.    Nasopharynx:       R. Eustachian canal opening, torus, fossa of Rosenmuller appear normal  L. Eustachian canal opening, torus, fossa of Rosenmuller appear normal      Oropharynx:        Base of tongue normal appearing.  Vallecula normal appearing.  Epiglottis normal appearing.  PE folds normal appearing.    Larynx:      R. AE fold, false vocal fold, arytenoid appear normal  L. AE fold, false vocal fold, arytenoid appear normal  R. Cord mobile  L. Cord mobile   R. Piriform sinus appears normal  L. Piriform sinus appears normal      Bailee CASTANEDA M.D.  Electronically signed by: Bailee CASTANEDA M.D., 9/13/2022 3:50 PM  [phone]

## 2022-09-13 NOTE — PROGRESS NOTES
Patient was seen today in clinic with Dr. Byrne for follow up.  Vitals signs and weight were obtained and pain assessment was completed.  Allergies and medications were reviewed with the patient.       Vitals/Pain:  Vitals:    09/13/22 1506   BP: (!) 146/99   Pulse: 64   Resp: 18   Temp: 36.1 °C (97 °F)   SpO2: 93%   Weight: 69.4 kg (153 lb)   Pain Score: No pain        Allergies:   Shellfish allergy and Garlic    Current Medications:  Current Outpatient Medications   Medication Sig Dispense Refill    B Complex-C (VITAMIN B + C COMPLEX PO)       ROPINIRole (REQUIP) 0.5 MG Tab Take 0.5 mg by mouth 3 times a day.      vitamin D3 (CHOLECALCIFEROL) 1000 Unit (25 mcg) Tab Take 1,000 Units by mouth every day.      Ibuprofen 200 MG Cap Take  by mouth.      fluticasone (FLONASE) 50 MCG/ACT nasal spray Spray 1-2 Sprays in nose 1 time daily as needed (for allergies).      fexofenadine (ALLEGRA) 180 MG tablet Take 180 mg by mouth every morning.      hydroCHLOROthiazide (HYDRODIURIL) 25 MG Tab Take 25 mg by mouth every morning.      gabapentin (NEURONTIN) 300 MG Cap Take 600 mg by mouth 4 times a day.      ALPRAZolam (XANAX) 0.25 MG Tab Take 0.25 mg by mouth at bedtime as needed for Sleep.       No current facility-administered medications for this encounter.         PCP:  Maxwell Tejeda R.N.

## 2022-09-13 NOTE — PROGRESS NOTES
RADIATION ONCOLOGY FOLLOW-UP    Patient name:  Ina Small    Primary Physician:  HENRRY Earl MRN: [id number]  CSN: [id number]   Referring physician:  No ref. provider found  : 10/28/1933, 88 y.o.     DATE OF SERVICE:   2022    IDENTIFICATION:   A 88 y.o. female with  Tonsil cancer (HCC)  Staging form: Pharynx - HPV-Mediated Oropharynx, AJCC 8th Edition  - Clinical stage from 2021: Stage I (cT1, cN1, cM0, p16+) - Signed by Bailee CASTANEDA M.D. on 2021  Histopathologic type: Squamous cell carcinoma, NOS  Stage prefix: Initial diagnosis      RADIATION SUMMARY:  [facility] Treatment Information          Some values may be hidden. Unless noted otherwise, only the newest values recorded on each date are displayed.           [facility] Treatment Summary 10/5/21 10/11/21   Course First Treatment Date 2021   Course Last Treatment Date 10/05/2021 10/05/2021   HN Plan from Course [id number]   Fraction 35 of 35     Elapsed Course Days 49 @ [id number]     Prescribed Fraction Dose 200 cGy     Prescribed Total Dose 7,000 cGy     HN cp Reference Point from Course [id number]   Elapsed Course Days 49 @ [id number]     Session Dose --     Total Dose 6,351 cGy     PTV70 Reference Point from Course [id number]   Elapsed Course Days 49 @ [id number]     Session Dose --     Total Dose 7,000 cGy      Some values recorded on this date have been omitted.                 HISTORY OF PRESENT ILLNESS:   Subjective    22 87-year-old female with past medical history significant for elevated cholesterol, hypertension, peripheral neuropathy, and cataracts.  She presented to ENT in May for work-up of sinus congestion.  While undergoing work-up she was noted to have abnormal tonsils described as 2+ with a 5 mm papillomatous growth involving the right tonsil.     Removal of the growth was recommended with concern for possible predisposition to cancer.     She underwent bilateral  tonsillectomy 7/15/2021.  The right tonsil was negative for malignancy.  The left tonsil demonstrated squamous cell carcinoma.     Follow-up MRI of the neck and soft tissues 7/23/2021 demonstrated a level two jugulodigastric node on the left measuring approximately 2.3 cm.  PET CT scan 7/26/2021 demonstrated bilateral tonsillar uptake considered to be postoperative.  She also had some mild uptake noted in the level two left neck node.  Uptake in the node was not mentioned in the PET/CT report.     Her primary complaint at this time is pharyngitis post tonsillectomy.  She denies otalgia and weight loss.     She has a remote smoking history total 7.5 pack years.  Stopped smoking approximately 50 years ago.     11/18/21 Initial follow-up visit post completion of therapy.  Reports some mild odynophagia left posterior throat.  Is able to eat reports her appetite is not as good as it used to be.  Denies dysphagia.  Xerostomia 6 ( 0-10, 0=completely dry), Taste 6 ( 0-10, 0=no taste), Dysphagia 0, Odynophagia mild    3/15/2022.  Scheduled follow-up visit.  Overall doing well.  No complaints of dysphagia odynophagia.  Does have some xerostomia and impaired taste.  Getting regular dental visits.  Xerostomia 5 ( 0-10, 0=completely dry), Taste 7 ( 0-10, 0=no taste), Dysphagia 0, Odynophagia 0    5/20/2022.  Scheduled follow-up visit.  Overall doing well.  No complaints of dysphagia odynophagia.  Reports her xerostomia and taste are improving.  Had 1 dental implant in her upper arch without problems.  Xerostomia 5 ( 0-10, 0=completely dry), Taste 7 ( 0-10, 0=no taste), Dysphagia 0, Odynophagia 0    7/21/2022.  Scheduled follow-up visit.  Continues to do well.  Stabilization and taste and xerostomia.  No dysphagia or odynophagia.  Getting her teeth capped.  Xerostomia 5  ( 0-10, 0=completely dry), Taste 7-8 ( 0-10, 0=no taste), Dysphagia 0, Odynophagia 0        INTERVAL HISTORY:  9/13/2022.  Scheduled follow-up visit.  Continues  to do well.  Improvement in xerostomia and taste.  No significant dysphagia or odynophagia.  Getting regular dental care.  Xerostomia 8 ( 0-10, 0=completely dry), Taste 7-8 ( 0-10, 0=no taste), Dysphagia 0, Odynophagia 0    CURRENT MEDICATIONS:  Current Outpatient Medications   Medication Sig Dispense Refill    B Complex-C (VITAMIN B + C COMPLEX PO)       ROPINIRole (REQUIP) 0.5 MG Tab Take 0.5 mg by mouth 3 times a day.      vitamin D3 (CHOLECALCIFEROL) 1000 Unit (25 mcg) Tab Take 1,000 Units by mouth every day.      Ibuprofen 200 MG Cap Take  by mouth.      fluticasone (FLONASE) 50 MCG/ACT nasal spray Spray 1-2 Sprays in nose 1 time daily as needed (for allergies).      fexofenadine (ALLEGRA) 180 MG tablet Take 180 mg by mouth every morning.      hydroCHLOROthiazide (HYDRODIURIL) 25 MG Tab Take 25 mg by mouth every morning.      gabapentin (NEURONTIN) 300 MG Cap Take 600 mg by mouth 4 times a day.      ALPRAZolam (XANAX) 0.25 MG Tab Take 0.25 mg by mouth at bedtime as needed for Sleep.       No current facility-administered medications for this encounter.       ALLERGIES:  Shellfish allergy and Garlic    REVIEW OF SYSTEMS:    A complete review of system taken. Pertinent items in HPI.  All other negative.    PHYSICAL EXAM:   PERFORMANCE STATUS:  No flowsheet data found.  Vitals 9/13/2022 7/21/2022 5/20/2022 3/15/2022 1/4/2022 11/18/2021 9/29/2021   SYSTOLIC 146 138 136 144 152 152 143   DIASTOLIC 99 82 80 74 83 76 71   PULSE 64 82 71 102 80 84 91   TEMPERATURE 97 97.4 98.1 96.7 96.6 97.8 -   RESPIRATIONS 18 - 16 - 18 - -   WEIGHT 153 153.66 150 148 150 148.37 152.34   HEIGHT - - - - - - -   BMI 24.71 kg/m2 24.81 kg/m2 24.22 kg/m2 23.9 kg/m2 24.22 kg/m2 23.96 kg/m2 24.6 kg/m2   SPO2 93 94 97 91 97 93 92       Physical Exam  Vitals and nursing note reviewed.   Constitutional:       General: She is not in acute distress.     Appearance: She is well-developed.   HENT:      Head: Normocephalic.      Mouth/Throat:       Mouth: Mucous membranes are moist.      Pharynx: Oropharynx is clear. No oropharyngeal exudate or posterior oropharyngeal erythema.   Eyes:      Conjunctiva/sclera: Conjunctivae normal.      Pupils: Pupils are equal, round, and reactive to light.   Cardiovascular:      Rate and Rhythm: Normal rate and regular rhythm.   Pulmonary:      Effort: Pulmonary effort is normal.   Musculoskeletal:         General: Normal range of motion.      Cervical back: Normal range of motion.   Lymphadenopathy:      Cervical: No cervical adenopathy.   Skin:     General: Skin is warm and dry.      Findings: No erythema.   Neurological:      Mental Status: She is alert and oriented to person, place, and time.   Psychiatric:         Behavior: Behavior normal.         Thought Content: Thought content normal.         Judgment: Judgment normal.         FIBEROPTIC EXAM:  Normal exam    LABORATORY DATA:   Lab Results   Component Value Date/Time    WBC 7.0 07/01/2021 01:45 PM    RBC 5.35 07/01/2021 01:45 PM    HEMOGLOBIN 15.4 07/01/2021 01:45 PM    HEMATOCRIT 46.9 07/01/2021 01:45 PM    MCV 87.7 07/01/2021 01:45 PM    MCH 28.8 07/01/2021 01:45 PM    MCHC 32.8 (L) 07/01/2021 01:45 PM    RDW 44.2 07/01/2021 01:45 PM    PLATELETCT 256 07/01/2021 01:45 PM    MPV 11.0 07/01/2021 01:45 PM      Lab Results   Component Value Date/Time    SODIUM 132 (L) 07/01/2021 01:45 PM    POTASSIUM 3.8 07/01/2021 01:45 PM    CHLORIDE 93 (L) 07/01/2021 01:45 PM    CO2 28 07/01/2021 01:45 PM    GLUCOSE 112 (H) 07/01/2021 01:45 PM    BUN 14 07/01/2021 01:45 PM    CREATININE 0.97 07/01/2021 01:45 PM         RADIOLOGY DATA:  No results found.    IMPRESSION:    A 88 y.o. with  Tonsil cancer (HCC)  Staging form: Pharynx - HPV-Mediated Oropharynx, AJCC 8th Edition  - Clinical stage from 7/30/2021: Stage I (cT1, cN1, cM0, p16+) - Signed by Bailee CASTANEDA M.D. on 7/30/2021  Histopathologic type: Squamous cell carcinoma, NOS  Stage prefix: Initial diagnosis      CANCER  STATUS:  No Evidence of Disease    RECOMMENDATIONS:   Reviewed exam findings with patient.  Reassured her.  She will see Dr. Escobar next month and return here for follow-up in 3 months after her visit with Dr. Escobar.  We will see her in January 2023.      Thank you for the opportunity to participate in her care.  If any questions or comments, please do not hesitate in calling.      No orders of the defined types were placed in this encounter.

## 2022-11-11 ENCOUNTER — PATIENT MESSAGE (OUTPATIENT)
Dept: HEALTH INFORMATION MANAGEMENT | Facility: OTHER | Age: 87
End: 2022-11-11

## 2022-11-25 ENCOUNTER — OFFICE VISIT (OUTPATIENT)
Dept: URGENT CARE | Facility: CLINIC | Age: 87
End: 2022-11-25
Payer: MEDICARE

## 2022-11-25 ENCOUNTER — APPOINTMENT (OUTPATIENT)
Dept: RADIOLOGY | Facility: IMAGING CENTER | Age: 87
End: 2022-11-25
Attending: NURSE PRACTITIONER
Payer: MEDICARE

## 2022-11-25 VITALS
SYSTOLIC BLOOD PRESSURE: 122 MMHG | DIASTOLIC BLOOD PRESSURE: 84 MMHG | WEIGHT: 154 LBS | HEIGHT: 67 IN | TEMPERATURE: 100.4 F | RESPIRATION RATE: 16 BRPM | OXYGEN SATURATION: 92 % | HEART RATE: 84 BPM | BODY MASS INDEX: 24.17 KG/M2

## 2022-11-25 DIAGNOSIS — R05.1 ACUTE COUGH: ICD-10-CM

## 2022-11-25 DIAGNOSIS — R06.02 SOB (SHORTNESS OF BREATH): ICD-10-CM

## 2022-11-25 DIAGNOSIS — J10.1 INFLUENZA A: ICD-10-CM

## 2022-11-25 DIAGNOSIS — R06.2 WHEEZING: ICD-10-CM

## 2022-11-25 LAB
FLUAV+FLUBV AG SPEC QL IA: NORMAL
INT CON NEG: NEGATIVE
INT CON POS: POSITIVE

## 2022-11-25 PROCEDURE — 99204 OFFICE O/P NEW MOD 45 MIN: CPT | Performed by: NURSE PRACTITIONER

## 2022-11-25 PROCEDURE — 87804 INFLUENZA ASSAY W/OPTIC: CPT | Performed by: NURSE PRACTITIONER

## 2022-11-25 PROCEDURE — 71046 X-RAY EXAM CHEST 2 VIEWS: CPT | Mod: TC | Performed by: FAMILY MEDICINE

## 2022-11-25 RX ORDER — ALBUTEROL SULFATE 90 UG/1
1-2 AEROSOL, METERED RESPIRATORY (INHALATION) EVERY 4 HOURS PRN
Qty: 8 G | Refills: 0 | Status: SHIPPED | OUTPATIENT
Start: 2022-11-25 | End: 2022-11-25

## 2022-11-25 RX ORDER — ALBUTEROL SULFATE 2.5 MG/3ML
2.5 SOLUTION RESPIRATORY (INHALATION) EVERY 4 HOURS PRN
Qty: 90 ML | Refills: 0 | Status: SHIPPED | OUTPATIENT
Start: 2022-11-25

## 2022-11-25 RX ORDER — OSELTAMIVIR PHOSPHATE 75 MG/1
75 CAPSULE ORAL 2 TIMES DAILY
Qty: 10 CAPSULE | Refills: 0 | Status: SHIPPED | OUTPATIENT
Start: 2022-11-25 | End: 2022-11-30

## 2022-11-25 RX ORDER — PREDNISONE 20 MG/1
20 TABLET ORAL DAILY
Qty: 3 TABLET | Refills: 0 | Status: SHIPPED | OUTPATIENT
Start: 2022-11-25 | End: 2022-11-28

## 2022-11-25 RX ORDER — AZITHROMYCIN 250 MG/1
TABLET, FILM COATED ORAL
Qty: 6 TABLET | Refills: 0 | Status: SHIPPED | OUTPATIENT
Start: 2022-11-25 | End: 2022-11-25

## 2022-11-25 ASSESSMENT — ENCOUNTER SYMPTOMS
FEVER: 1
COUGH: 1
MYALGIAS: 1

## 2022-11-25 ASSESSMENT — VISUAL ACUITY: OU: 1

## 2022-11-26 NOTE — PATIENT INSTRUCTIONS
Details    Reading Physician Reading Date Result Priority   Bigg Park M.D.  [phone] 11/25/2022 Urgent Care     Narrative & Impression     11/25/2022 8:11 PM     HISTORY/REASON FOR EXAM:  Cough.        TECHNIQUE/EXAM DESCRIPTION AND NUMBER OF VIEWS:  Two views of the chest.     COMPARISON:  PET CT 12/30/2021     FINDINGS:  The heart is normal in size.  No pulmonary infiltrates or consolidations are noted. Fibrotic and interstitial opacifications in the left lung base and right lung apex are again noted.  No pleural effusions are appreciated.        IMPRESSION:     No active disease.           Exam Ended: 11/25/22  8:21 PM Last Resulted: 11/25/22  8:25 PM

## 2023-01-13 ENCOUNTER — HOSPITAL ENCOUNTER (OUTPATIENT)
Dept: RADIATION ONCOLOGY | Facility: MEDICAL CENTER | Age: 88
End: 2023-01-31
Attending: RADIOLOGY
Payer: MEDICARE

## 2023-01-13 VITALS
SYSTOLIC BLOOD PRESSURE: 129 MMHG | OXYGEN SATURATION: 96 % | BODY MASS INDEX: 24.92 KG/M2 | HEART RATE: 66 BPM | WEIGHT: 156.75 LBS | DIASTOLIC BLOOD PRESSURE: 66 MMHG | TEMPERATURE: 97.6 F

## 2023-01-13 PROCEDURE — 31575 DIAGNOSTIC LARYNGOSCOPY: CPT | Performed by: RADIOLOGY

## 2023-01-13 PROCEDURE — 99212 OFFICE O/P EST SF 10 MIN: CPT | Mod: 25 | Performed by: RADIOLOGY

## 2023-01-13 PROCEDURE — 99213 OFFICE O/P EST LOW 20 MIN: CPT | Mod: 25 | Performed by: RADIOLOGY

## 2023-01-13 RX ORDER — LEVOTHYROXINE SODIUM 0.05 MG/1
TABLET ORAL
COMMUNITY
Start: 2022-11-23

## 2023-01-13 ASSESSMENT — PAIN SCALES - GENERAL: PAINLEVEL: NO PAIN

## 2023-01-13 NOTE — PROGRESS NOTES
RADIATION ONCOLOGY FOLLOW-UP    Patient name:  Ina Small    Primary Physician:  HENRRY Earl MRN: [id number]  [facility]: [id number]   Referring physician:  Bigg Ren M.D.  : 10/28/1933, 89 y.o.     DATE OF SERVICE:   2023    IDENTIFICATION:   A 89 y.o. female with  Tonsil cancer (HCC)  Staging form: Pharynx - HPV-Mediated Oropharynx, AJCC 8th Edition  - Clinical stage from 2021: Stage I (cT1, cN1, cM0, p16+) - Signed by Bailee CASTANEDA M.D. on 2021  Histopathologic type: Squamous cell carcinoma, NOS  Stage prefix: Initial diagnosis      RADIATION SUMMARY:  Radiation Oncology          10/5/2021 10/11/2021    15:03   [facility] Course Treatment Dates   Course First Treatment Date 2021     Course Last Treatment Date 10/05/2021   10/05/2021     [facility] Treatment Summary   HN  Plan from Course [id number]   Fraction 35 of 35    35 of 35    Elapsed Course Days 49 @ [id number]    49 @ [id number]    Prescribed Fraction Dose 200 cGy    200 cGy    Prescribed Total Dose 7,000 cGy    7,000 cGy    HN cp  Reference Point from Course [id number]   Elapsed Course Days 49 @ [id number]    49 @ [id number]    Session Dose 181 cGy    --    Total Dose 6,351 cGy    6,351 cGy    PTV70  Reference Point from Course [id number]   Elapsed Course Days  @ [id number]    49 @ [id number]    Session Dose 200 cGy    --    Total Dose 7,000 cGy    7,000 cGy         HISTORY OF PRESENT ILLNESS:   Subjective    22 87-year-old female with past medical history significant for elevated cholesterol, hypertension, peripheral neuropathy, and cataracts.  She presented to ENT in May for work-up of sinus congestion.  While undergoing work-up she was noted to have abnormal tonsils described as 2+ with a 5 mm papillomatous growth involving the right tonsil.     Removal of the growth was recommended with concern for possible predisposition to cancer.     She underwent bilateral  tonsillectomy 7/15/2021.  The right tonsil was negative for malignancy.  The left tonsil demonstrated squamous cell carcinoma.     Follow-up MRI of the neck and soft tissues 7/23/2021 demonstrated a level two jugulodigastric node on the left measuring approximately 2.3 cm.  PET CT scan 7/26/2021 demonstrated bilateral tonsillar uptake considered to be postoperative.  She also had some mild uptake noted in the level two left neck node.  Uptake in the node was not mentioned in the PET/CT report.     Her primary complaint at this time is pharyngitis post tonsillectomy.  She denies otalgia and weight loss.     She has a remote smoking history total 7.5 pack years.  Stopped smoking approximately 50 years ago.     11/18/21 Initial follow-up visit post completion of therapy.  Reports some mild odynophagia left posterior throat.  Is able to eat reports her appetite is not as good as it used to be.  Denies dysphagia.  Xerostomia 6 ( 0-10, 0=completely dry), Taste 6 ( 0-10, 0=no taste), Dysphagia 0, Odynophagia mild     3/15/2022.  Scheduled follow-up visit.  Overall doing well.  No complaints of dysphagia odynophagia.  Does have some xerostomia and impaired taste.  Getting regular dental visits.  Xerostomia 5 ( 0-10, 0=completely dry), Taste 7 ( 0-10, 0=no taste), Dysphagia 0, Odynophagia 0     5/20/2022.  Scheduled follow-up visit.  Overall doing well.  No complaints of dysphagia odynophagia.  Reports her xerostomia and taste are improving.  Had 1 dental implant in her upper arch without problems.  Xerostomia 5 ( 0-10, 0=completely dry), Taste 7 ( 0-10, 0=no taste), Dysphagia 0, Odynophagia 0     7/21/2022.  Scheduled follow-up visit.  Continues to do well.  Stabilization and taste and xerostomia.  No dysphagia or odynophagia.  Getting her teeth capped.  Xerostomia 5  ( 0-10, 0=completely dry), Taste 7-8 ( 0-10, 0=no taste), Dysphagia 0, Odynophagia 0    9/13/2022.  Scheduled follow-up visit.  Continues to do well.   Improvement in xerostomia and taste.  No significant dysphagia or odynophagia.  Getting regular dental care.  Xerostomia 8 ( 0-10, 0=completely dry), Taste 7-8 ( 0-10, 0=no taste), Dysphagia 0, Odynophagia 0        INTERVAL HISTORY:  1/13/2023.  Scheduled follow-up visit.  Continues to do well.  Getting regular dental care.  Mild improvement in xerostomia and taste.  No significant dysphagia or odynophagia.  Recently was noted to be hypothyroid and started on Synthroid.  Xerostomia 7 ( 0-10, 0=completely dry), Taste 7 on oh ( 0-10, 0=no taste), Dysphagia 0, Odynophagia 0     CURRENT MEDICATIONS:  Current Outpatient Medications   Medication Sig Dispense Refill    levothyroxine (SYNTHROID) 50 MCG Tab       albuterol (PROVENTIL) 2.5mg/3ml Nebu Soln solution for nebulization Take 3 mL by nebulization every four hours as needed for Shortness of Breath (and/or wheezing). 90 mL 0    B Complex-C (VITAMIN B + C COMPLEX PO)       ROPINIRole (REQUIP) 0.5 MG Tab Take 0.5 mg by mouth 3 times a day.      vitamin D3 (CHOLECALCIFEROL) 1000 Unit (25 mcg) Tab Take 1,000 Units by mouth every day.      Ibuprofen 200 MG Cap Take  by mouth.      fluticasone (FLONASE) 50 MCG/ACT nasal spray Spray 1-2 Sprays in nose 1 time daily as needed (for allergies).      fexofenadine (ALLEGRA) 180 MG tablet Take 180 mg by mouth every morning.      hydroCHLOROthiazide (HYDRODIURIL) 25 MG Tab Take 25 mg by mouth every morning.      gabapentin (NEURONTIN) 300 MG Cap Take 600 mg by mouth 4 times a day.       No current facility-administered medications for this encounter.       ALLERGIES:  Shellfish allergy and Garlic    REVIEW OF SYSTEMS:    A complete review of system taken. Pertinent items in HPI.  All other negative.    PHYSICAL EXAM:   PERFORMANCE STATUS:       View : No data to display.                  1/13/2023 11/25/2022 9/13/2022 7/21/2022 5/20/2022 3/15/2022 1/4/2022   Vitals   SYSTOLIC 129 122 146 138 136 144 152   DIASTOLIC 66 84 99 82 80 74  "83   Pulse 66 84 64 82 71 102 80   Temperature 36.4 °C (97.6 °F) 38 °C (100.4 °F) 36.1 °C (97 °F) 36.3 °C (97.4 °F) 36.7 °C (98.1 °F) 35.9 °C (96.7 °F) 35.9 °C (96.6 °F)   Respiration  16 18  16  18   Weight 156.75 154 153 153.66 150 148 150   Height  1.689 m (5' 6.5\")        BMI 24.92 kg/m2 24.48 kg/m2 24.71 kg/m2 24.81 kg/m2 24.22 kg/m2 23.9 kg/m2 24.22 kg/m2   Pulse Oximetry 96 % 92 % 93 % 94 % 97 % 91 % 97 %       Multiple values from one day are sorted in reverse-chronological order       Physical Exam  Vitals and nursing note reviewed.   Constitutional:       General: She is not in acute distress.     Appearance: She is well-developed.   HENT:      Head: Normocephalic.      Mouth/Throat:      Mouth: Mucous membranes are moist.      Pharynx: Oropharynx is clear. No oropharyngeal exudate or posterior oropharyngeal erythema.   Eyes:      Conjunctiva/sclera: Conjunctivae normal.      Pupils: Pupils are equal, round, and reactive to light.   Cardiovascular:      Rate and Rhythm: Normal rate and regular rhythm.   Pulmonary:      Effort: Pulmonary effort is normal.   Musculoskeletal:         General: Normal range of motion.      Cervical back: Normal range of motion.   Lymphadenopathy:      Cervical: No cervical adenopathy.   Skin:     General: Skin is warm and dry.      Findings: No erythema.   Neurological:      Mental Status: She is alert and oriented to person, place, and time.   Psychiatric:         Behavior: Behavior normal.         Thought Content: Thought content normal.         Judgment: Judgment normal.         FIBEROPTIC EXAM:  Normal exam    LABORATORY DATA:   Lab Results   Component Value Date/Time    WBC 7.0 07/01/2021 01:45 PM    RBC 5.35 07/01/2021 01:45 PM    HEMOGLOBIN 15.4 07/01/2021 01:45 PM    HEMATOCRIT 46.9 07/01/2021 01:45 PM    MCV 87.7 07/01/2021 01:45 PM    MCH 28.8 07/01/2021 01:45 PM    MCHC 32.8 (L) 07/01/2021 01:45 PM    RDW 44.2 07/01/2021 01:45 PM    PLATELETCT 256 07/01/2021 01:45 PM "    MPV 11.0 07/01/2021 01:45 PM      Lab Results   Component Value Date/Time    SODIUM 132 (L) 07/01/2021 01:45 PM    POTASSIUM 3.8 07/01/2021 01:45 PM    CHLORIDE 93 (L) 07/01/2021 01:45 PM    CO2 28 07/01/2021 01:45 PM    GLUCOSE 112 (H) 07/01/2021 01:45 PM    BUN 14 07/01/2021 01:45 PM    CREATININE 0.97 07/01/2021 01:45 PM         RADIOLOGY DATA:  DX-CHEST-2 VIEWS    Result Date: 11/25/2022  No active disease.      IMPRESSION:    A 89 y.o. with  Tonsil cancer (HCC)  Staging form: Pharynx - HPV-Mediated Oropharynx, AJCC 8th Edition  - Clinical stage from 7/30/2021: Stage I (cT1, cN1, cM0, p16+) - Signed by Bailee CASTANEDA M.D. on 7/30/2021  Histopathologic type: Squamous cell carcinoma, NOS  Stage prefix: Initial diagnosis      CANCER STATUS:  No Evidence of Disease    RECOMMENDATIONS:   Reviewed exam findings with patient.  Reassured her.  We will see her back for follow-up in July 2023.      Thank you for the opportunity to participate in her care.  If any questions or comments, please do not hesitate in calling.      Orders Placed This Encounter    levothyroxine (SYNTHROID) 50 MCG Tab

## 2023-01-13 NOTE — PROGRESS NOTES
Patient was seen today in clinic with Dr. Byrne for follow up.  Vitals signs and weight were obtained and pain assessment was completed.  Allergies and medications were reviewed with the patient.  Toxicities of treatment assessed.     Vitals/Pain:  Vitals:    01/13/23 1304   BP: 129/66   BP Location: Right arm   Patient Position: Sitting   BP Cuff Size: Adult   Pulse: 66   Temp: 36.4 °C (97.6 °F)   TempSrc: Temporal   SpO2: 96%   Weight: 71.1 kg (156 lb 12 oz)   Pain Score: No pain        Allergies:   Shellfish allergy and Garlic    Current Medications:  Current Outpatient Medications   Medication Sig Dispense Refill    levothyroxine (SYNTHROID) 50 MCG Tab       albuterol (PROVENTIL) 2.5mg/3ml Nebu Soln solution for nebulization Take 3 mL by nebulization every four hours as needed for Shortness of Breath (and/or wheezing). 90 mL 0    B Complex-C (VITAMIN B + C COMPLEX PO)       ROPINIRole (REQUIP) 0.5 MG Tab Take 0.5 mg by mouth 3 times a day.      vitamin D3 (CHOLECALCIFEROL) 1000 Unit (25 mcg) Tab Take 1,000 Units by mouth every day.      Ibuprofen 200 MG Cap Take  by mouth.      fluticasone (FLONASE) 50 MCG/ACT nasal spray Spray 1-2 Sprays in nose 1 time daily as needed (for allergies).      fexofenadine (ALLEGRA) 180 MG tablet Take 180 mg by mouth every morning.      hydroCHLOROthiazide (HYDRODIURIL) 25 MG Tab Take 25 mg by mouth every morning.      gabapentin (NEURONTIN) 300 MG Cap Take 600 mg by mouth 4 times a day.       No current facility-administered medications for this encounter.           Maral Hernandez, Med Ass't

## 2023-01-13 NOTE — PROCEDURES
DATE OF SERVICE: 1/13/2023         FIBEROPTIC NASO-PHARYNGOSCOPY NOTE      Flexible fiberoptic exam was performed after anesthesia of left nostril and oropharynx.    Nasopharynx:       R. Eustachian canal opening, torus, fossa of Rosenmuller appear normal  L. Eustachian canal opening, torus, fossa of Rosenmuller appear normal      Oropharynx:        Base of tongue normal appearing.  Vallecula normal appearing.  Epiglottis normal appearing.  PE folds normal appearing.    Larynx:      R. AE fold, false vocal fold, arytenoid appear normal  L. AE fold, false vocal fold, arytenoid appear normal  R. Cord mobile  L. Cord mobile   R. Piriform sinus appears normal  L. Piriform sinus appears normal      Bailee CASTANEDA M.D.  Electronically signed by: Bailee CASTANEDA M.D., 1/13/2023 2:04 PM  [phone]

## 2023-07-13 ASSESSMENT — LIFESTYLE VARIABLES
TOBACCO_USE: NO
SMOKING_STATUS: NO

## 2023-07-14 ENCOUNTER — HOSPITAL ENCOUNTER (OUTPATIENT)
Dept: RADIATION ONCOLOGY | Facility: MEDICAL CENTER | Age: 88
End: 2023-07-31
Attending: RADIOLOGY
Payer: MEDICARE

## 2023-07-14 VITALS
TEMPERATURE: 97.2 F | OXYGEN SATURATION: 96 % | SYSTOLIC BLOOD PRESSURE: 139 MMHG | DIASTOLIC BLOOD PRESSURE: 73 MMHG | HEART RATE: 68 BPM | WEIGHT: 158.07 LBS | BODY MASS INDEX: 25.13 KG/M2

## 2023-07-14 PROCEDURE — 99213 OFFICE O/P EST LOW 20 MIN: CPT | Mod: 25 | Performed by: RADIOLOGY

## 2023-07-14 PROCEDURE — 99212 OFFICE O/P EST SF 10 MIN: CPT | Mod: 25 | Performed by: RADIOLOGY

## 2023-07-14 PROCEDURE — 31575 DIAGNOSTIC LARYNGOSCOPY: CPT | Performed by: RADIOLOGY

## 2023-07-14 ASSESSMENT — PAIN SCALES - GENERAL: PAINLEVEL: NO PAIN

## 2023-07-14 NOTE — PROCEDURES
DATE OF SERVICE: 7/14/2023         FIBEROPTIC NASO-PHARYNGOSCOPY NOTE      Flexible fiberoptic exam was performed after anesthesia of left nostril and oropharynx.    Nasopharynx:       R. Eustachian canal opening, torus, fossa of Rosenmuller appear normal  L. Eustachian canal opening, torus, fossa of Rosenmuller appear normal      Oropharynx:        Base of tongue normal appearing.  Vallecula normal appearing.  Epiglottis normal appearing.  PE folds normal appearing.    Larynx:      R. AE fold, false vocal fold, arytenoid appear normal  L. AE fold, false vocal fold, arytenoid appear normal  R. Cord mobile  L. Cord mobile   R. Piriform sinus appears normal  L. Piriform sinus appears normal      Bailee CASTANEDA M.D.  Electronically signed by: Bailee CASTANEDA M.D., 7/14/2023 1:44 PM  [phone]

## 2023-07-14 NOTE — PROGRESS NOTES
RADIATION ONCOLOGY FOLLOW-UP    Patient name:  Ina Small    Primary Physician:  HENRRY Earl MRN: [id number]  [facility]: [id number]   Referring physician:  Bigg Ren M.D.  : 10/28/1933, 89 y.o.     DATE OF SERVICE:   2023    IDENTIFICATION:   A 89 y.o. female with  Tonsil cancer (HCC)  Staging form: Pharynx - HPV-Mediated Oropharynx, AJCC 8th Edition  - Clinical stage from 2021: Stage I (cT1, cN1, cM0, p16+) - Signed by Bailee CASTANEDA M.D. on 2021  Histopathologic type: Squamous cell carcinoma, NOS  Stage prefix: Initial diagnosis      RADIATION SUMMARY:  Radiation Oncology          10/5/2021 10/11/2021    15:03   Aria Course Treatment Dates   Course First Treatment Date 2021    Course Last Treatment Date 10/05/2021  10/05/2021    Aria Treatment Summary   HN  Plan from Course [id number]   Fraction 35 of 35    35 of 35    Elapsed Course Days 49 @ [id number]    49 @ [id number]    Prescribed Fraction Dose 200 cGy    200 cGy    Prescribed Total Dose 7,000 cGy    7,000 cGy    HN cp  Reference Point from Course [id number]   Elapsed Course Days 49 @ [id number]    49 @ [id number]    Session Dose 181 cGy    --    Total Dose 6,351 cGy    6,351 cGy    PTV70  Reference Point from Course [id number]   Elapsed Course Days  @ [id number]    49 @ [id number]    Session Dose 200 cGy    --    Total Dose 7,000 cGy    7,000 cGy         HISTORY OF PRESENT ILLNESS:   Subjective    22 87-year-old female with past medical history significant for elevated cholesterol, hypertension, peripheral neuropathy, and cataracts.  She presented to ENT in May for work-up of sinus congestion.  While undergoing work-up she was noted to have abnormal tonsils described as 2+ with a 5 mm papillomatous growth involving the right tonsil.     Removal of the growth was recommended with concern for possible predisposition to cancer.     She underwent bilateral tonsillectomy  7/15/2021.  The right tonsil was negative for malignancy.  The left tonsil demonstrated squamous cell carcinoma.     Follow-up MRI of the neck and soft tissues 7/23/2021 demonstrated a level two jugulodigastric node on the left measuring approximately 2.3 cm.  PET CT scan 7/26/2021 demonstrated bilateral tonsillar uptake considered to be postoperative.  She also had some mild uptake noted in the level two left neck node.  Uptake in the node was not mentioned in the PET/CT report.     Her primary complaint at this time is pharyngitis post tonsillectomy.  She denies otalgia and weight loss.     She has a remote smoking history total 7.5 pack years.  Stopped smoking approximately 50 years ago.     11/18/21 Initial follow-up visit post completion of therapy.  Reports some mild odynophagia left posterior throat.  Is able to eat reports her appetite is not as good as it used to be.  Denies dysphagia.  Xerostomia 6 ( 0-10, 0=completely dry), Taste 6 ( 0-10, 0=no taste), Dysphagia 0, Odynophagia mild     3/15/2022.  Scheduled follow-up visit.  Overall doing well.  No complaints of dysphagia odynophagia.  Does have some xerostomia and impaired taste.  Getting regular dental visits.  Xerostomia 5 ( 0-10, 0=completely dry), Taste 7 ( 0-10, 0=no taste), Dysphagia 0, Odynophagia 0     5/20/2022.  Scheduled follow-up visit.  Overall doing well.  No complaints of dysphagia odynophagia.  Reports her xerostomia and taste are improving.  Had 1 dental implant in her upper arch without problems.  Xerostomia 5 ( 0-10, 0=completely dry), Taste 7 ( 0-10, 0=no taste), Dysphagia 0, Odynophagia 0     7/21/2022.  Scheduled follow-up visit.  Continues to do well.  Stabilization and taste and xerostomia.  No dysphagia or odynophagia.  Getting her teeth capped.  Xerostomia 5  ( 0-10, 0=completely dry), Taste 7-8 ( 0-10, 0=no taste), Dysphagia 0, Odynophagia 0     9/13/2022.  Scheduled follow-up visit.  Continues to do well.  Improvement in  xerostomia and taste.  No significant dysphagia or odynophagia.  Getting regular dental care.  Xerostomia 8 ( 0-10, 0=completely dry), Taste 7-8 ( 0-10, 0=no taste), Dysphagia 0, Odynophagia 0    1/13/2023.  Scheduled follow-up visit.  Continues to do well.  Getting regular dental care.  Mild improvement in xerostomia and taste.  No significant dysphagia or odynophagia.  Recently was noted to be hypothyroid and started on Synthroid.  Xerostomia 7 ( 0-10, 0=completely dry), Taste 7 on oh ( 0-10, 0=no taste), Dysphagia 0, Odynophagia 0         INTERVAL HISTORY:  7/14/2023.  Scheduled follow-up visit.  Doing well.  Getting regular dental care.  Xerostomia and taste stable.  Is started on thyroid medication.  Xerostomia 5 ( 0-10, 0=completely dry), Taste 4 ( 0-10, 0=no taste), Dysphagia 0, Odynophagia 0    CURRENT MEDICATIONS:  Current Outpatient Medications   Medication Sig Dispense Refill    levothyroxine (SYNTHROID) 50 MCG Tab       albuterol (PROVENTIL) 2.5mg/3ml Nebu Soln solution for nebulization Take 3 mL by nebulization every four hours as needed for Shortness of Breath (and/or wheezing). 90 mL 0    B Complex-C (VITAMIN B + C COMPLEX PO)       ROPINIRole (REQUIP) 0.5 MG Tab Take 0.5 mg by mouth 3 times a day.      vitamin D3 (CHOLECALCIFEROL) 1000 Unit (25 mcg) Tab Take 1,000 Units by mouth every day.      Ibuprofen 200 MG Cap Take  by mouth.      fluticasone (FLONASE) 50 MCG/ACT nasal spray Spray 1-2 Sprays in nose 1 time daily as needed (for allergies).      fexofenadine (ALLEGRA) 180 MG tablet Take 180 mg by mouth every morning.      hydroCHLOROthiazide (HYDRODIURIL) 25 MG Tab Take 25 mg by mouth every morning.      gabapentin (NEURONTIN) 300 MG Cap Take 600 mg by mouth 4 times a day.       No current facility-administered medications for this encounter.       ALLERGIES:  Shellfish allergy and Garlic    REVIEW OF SYSTEMS:    A complete review of system taken. Pertinent items in HPI.  All other  "negative.    PHYSICAL EXAM:   PERFORMANCE STATUS:       No data to display                  7/14/2023     1:05 PM 1/13/2023     1:04 PM 11/25/2022     7:14 PM 9/13/2022     3:06 PM 7/21/2022     2:35 PM 5/20/2022     2:08 PM 3/15/2022     3:37 PM   Vitals   SYSTOLIC 139 129 122 146 138 136 144   DIASTOLIC 73 66 84 99 82 80 74   Pulse 68 66 84 64 82 71 102   Temperature 36.2 °C (97.2 °F) 36.4 °C (97.6 °F) 38 °C (100.4 °F) 36.1 °C (97 °F) 36.3 °C (97.4 °F) 36.7 °C (98.1 °F) 35.9 °C (96.7 °F)   Respiration   16 18  16    Weight 158.07 156.75 154 153 153.66 150 148   Height   1.689 m (5' 6.5\")       BMI 25.13 kg/m2 24.92 kg/m2 24.48 kg/m2 24.71 kg/m2 24.81 kg/m2 24.22 kg/m2 23.9 kg/m2   Pulse Oximetry 96 % 96 % 92 % 93 % 94 % 97 % 91 %       Physical Exam  Vitals and nursing note reviewed.   Constitutional:       General: She is not in acute distress.     Appearance: She is well-developed.   HENT:      Head: Normocephalic.      Mouth/Throat:      Mouth: Mucous membranes are moist.      Pharynx: Oropharynx is clear. No oropharyngeal exudate or posterior oropharyngeal erythema.   Eyes:      Conjunctiva/sclera: Conjunctivae normal.      Pupils: Pupils are equal, round, and reactive to light.   Cardiovascular:      Rate and Rhythm: Normal rate and regular rhythm.   Pulmonary:      Effort: Pulmonary effort is normal.   Musculoskeletal:         General: Normal range of motion.      Cervical back: Normal range of motion.   Lymphadenopathy:      Cervical: No cervical adenopathy.   Skin:     General: Skin is warm and dry.      Findings: No erythema.   Neurological:      Mental Status: She is alert and oriented to person, place, and time.   Psychiatric:         Behavior: Behavior normal.         Thought Content: Thought content normal.         Judgment: Judgment normal.           FIBEROPTIC EXAM:  Normal exam    LABORATORY DATA:   Lab Results   Component Value Date/Time    WBC 7.0 07/01/2021 01:45 PM    RBC 5.35 07/01/2021 01:45 " PM    HEMOGLOBIN 15.4 07/01/2021 01:45 PM    HEMATOCRIT 46.9 07/01/2021 01:45 PM    MCV 87.7 07/01/2021 01:45 PM    MCH 28.8 07/01/2021 01:45 PM    MCHC 32.8 (L) 07/01/2021 01:45 PM    RDW 44.2 07/01/2021 01:45 PM    PLATELETCT 256 07/01/2021 01:45 PM    MPV 11.0 07/01/2021 01:45 PM      Lab Results   Component Value Date/Time    SODIUM 132 (L) 07/01/2021 01:45 PM    POTASSIUM 3.8 07/01/2021 01:45 PM    CHLORIDE 93 (L) 07/01/2021 01:45 PM    CO2 28 07/01/2021 01:45 PM    GLUCOSE 112 (H) 07/01/2021 01:45 PM    BUN 14 07/01/2021 01:45 PM    CREATININE 0.97 07/01/2021 01:45 PM         RADIOLOGY DATA:  No results found.    IMPRESSION:    A 89 y.o. with  Tonsil cancer (HCC)  Staging form: Pharynx - HPV-Mediated Oropharynx, AJCC 8th Edition  - Clinical stage from 7/30/2021: Stage I (cT1, cN1, cM0, p16+) - Signed by Bailee CASTANEDA M.D. on 7/30/2021  Histopathologic type: Squamous cell carcinoma, NOS  Stage prefix: Initial diagnosis      CANCER STATUS:  No Evidence of Disease    RECOMMENDATIONS:   Reviewed fiberoptic exam with patient.  Reassured her.  She will see Dr. Rushing in October her 2-year anniversary we will see her back in April 2024.      Thank you for the opportunity to participate in her care.  If any questions or comments, please do not hesitate in calling.      No orders of the defined types were placed in this encounter.

## 2023-07-14 NOTE — PROGRESS NOTES
Patient was seen today in clinic with Dr. Byrne for follow up.  Vitals signs and weight were obtained and pain assessment was completed.  Allergies and medications were reviewed with the patient.       Vitals/Pain:  Vitals:    07/14/23 1305   BP: 139/73   BP Location: Right arm   Patient Position: Sitting   BP Cuff Size: Adult   Pulse: 68   Temp: 36.2 °C (97.2 °F)   TempSrc: Temporal   SpO2: 96%   Weight: 71.7 kg (158 lb 1.1 oz)   Pain Score: No pain        Allergies:   Shellfish allergy and Garlic    Current Medications:  Current Outpatient Medications   Medication Sig Dispense Refill    levothyroxine (SYNTHROID) 50 MCG Tab       albuterol (PROVENTIL) 2.5mg/3ml Nebu Soln solution for nebulization Take 3 mL by nebulization every four hours as needed for Shortness of Breath (and/or wheezing). 90 mL 0    B Complex-C (VITAMIN B + C COMPLEX PO)       ROPINIRole (REQUIP) 0.5 MG Tab Take 0.5 mg by mouth 3 times a day.      vitamin D3 (CHOLECALCIFEROL) 1000 Unit (25 mcg) Tab Take 1,000 Units by mouth every day.      Ibuprofen 200 MG Cap Take  by mouth.      fluticasone (FLONASE) 50 MCG/ACT nasal spray Spray 1-2 Sprays in nose 1 time daily as needed (for allergies).      fexofenadine (ALLEGRA) 180 MG tablet Take 180 mg by mouth every morning.      hydroCHLOROthiazide (HYDRODIURIL) 25 MG Tab Take 25 mg by mouth every morning.      gabapentin (NEURONTIN) 300 MG Cap Take 600 mg by mouth 4 times a day.       No current facility-administered medications for this encounter.           Maral Hernandez, Med Ass't

## 2023-11-29 ENCOUNTER — PATIENT MESSAGE (OUTPATIENT)
Dept: HEALTH INFORMATION MANAGEMENT | Facility: OTHER | Age: 88
End: 2023-11-29

## 2024-03-01 NOTE — ON TREATMENT VISIT
"  ON TREATMENT  NOTE  RADIATION ONCOLOGY DEPARTMENT    Patient name:  Ina Small    Primary Physician:  HENRRY Earl MRN: [id number]  [facility]: [id number]   Referring physician:  Bigg Ren M.D. : 10/28/1933, 87 y.o.     ENCOUNTER DATE:  2021      DIAGNOSIS:  Tonsil cancer (HCC)  Staging form: Pharynx - HPV-Mediated Oropharynx, AJCC 8th Edition  - Clinical stage from 2021: Stage I (cT1, cN1, cM0, p16+) - Signed by Bailee CASTANEDA M.D. on 2021  Histopathologic type: Squamous cell carcinoma, NOS  Stage prefix: Initial diagnosis      TREATMENT SUMMARY:  Aria Treatment Information        Some values may be hidden. Unless noted otherwise, only the newest values recorded on each date are displayed.         Aria Treatment Summary 21   Course First Treatment Date 2021   Course Last Treatment Date 2021   L Tonsil [HN] Plan from Course C1_LtonIPnP16woC   Fraction 2 of 35   Elapsed Course Days 1 @ [id number]   Prescribed Fraction Dose 200 cGy   Prescribed Total Dose 7,000 cGy   HN cp Reference Point from Course C1_[id number]   Elapsed Course Days 1 @ [id number]   Session Dose 181 cGy   Total Dose 363 cGy   PTV70 Reference Point from Course [id number]   Elapsed Course Days 1 @ [id number]   Session Dose 200 cGy   Total Dose 400 cGy                SUBJECTIVE:  Mild sore throat - persistent since tonsillectomy.      VITAL SIGNS:  Vitals 2021 2021 7/15/2021 2019 2019 2019 2018   SYSTOLIC 158 139 148 124 - - 138   DIASTOLIC 77 79 88 56 - - 100   PULSE 76 96 60 64 - - 76   TEMPERATURE 97.7 97.3 98.2 97.9 - - 97.4   RESPIRATIONS - - 16 18 - - -   WEIGHT 153 150 156.31 - 153 - 160.2   HEIGHT - 5' 6\" 5' 6\" - - 5' 7\" 5' 7.5\"   BMI 24.69 kg/m2 24.21 kg/m2 25.23 kg/m2 - 23.96 kg/m2 - 24.72 kg/m2   SPO2 93 95 91 98 - - 94     KPS: 70, Cares for self; unable to carry on normal activity or to do active work (ECOG equivalent 1)  Encounter " TRANSITIONAL CARE MANAGEMENT    Keli Maki is a 79 year old male and is accompanied today by his spouse.    Chief Complaint   Patient presents with    Hospital F/U     Discharged 2/28  *declined vaccines     Office Visit     The patient was discharged from the hospital on 2/28/2024 to his home. The Discharge Summary was reviewed. It documents that the patient was hospitalized for dizziness possibly from clonazepam, which was stopped inpatient.    Pertinent hospital performed diagnostic and lab tests were reviewed. Any un-finalized tests will be followed up on.    DME/Assistive devices prescribed:  front rolling walker      MEDICATIONS  The discharge medication list was reviewed. Outpatient medications were updated today as needed. He is fully compliant with the medication regimen prescribed at the time of discharge.      Review of Systems      Objective   Visit Vitals  /69 (BP Location: LUE - Left upper extremity, Patient Position: Sitting, Cuff Size: Large Adult)   Pulse 67   Ht 5' 9\" (1.753 m)   Wt 105.2 kg (232 lb)   SpO2 97%   BMI 34.26 kg/m²     Physical Exam      Assessment   Problem List Items Addressed This Visit          Symptoms and Signs    Dizziness     Other Visit Diagnoses       Hospital discharge follow-up    -  Primary        Disease/condition/illness specific self-management education was provided to the patient. Substantive elements include:  ***                                                             Patient adherence to his treatment plan was assessed. He is {treatment compliance:0009077}.   Vitals  Temperature: 36.5 °C (97.7 °F)  Temp src: Temporal  Blood Pressure : 158/77  Pulse: 76  Pulse Oximetry: 93 %  Weight: 69.4 kg (153 lb)  Pain Score: 1=Minimal Pain  Pain Assessment 8/18/2021   Pain Score 1   Pain Loc Throat   Some recent data might be hidden          PHYSICAL EXAM:  Physical Exam  Vitals and nursing note reviewed.   Constitutional:       Appearance: She is well-developed.   HENT:      Head: Normocephalic.   Skin:     General: Skin is warm and dry.      Findings: No erythema.   Neurological:      Mental Status: She is alert and oriented to person, place, and time.   Psychiatric:         Behavior: Behavior normal.         Thought Content: Thought content normal.         Judgment: Judgment normal.          Toxicity Assessment 8/18/2021   Toxicity Assessment Head/Neck   Fatigue (lethargy, malaise, asthenia) None   Radiation Dermatitis None   Rash/desquamation None   Constipation None   Dehydration None   Mouth Dryness Normal   RT Dysphagia-Pharyngeal None   Mucositis None   Salivary Gland Changes None   Stomatitis/Pharyngitis None   Taste Disturbance (dysgeusia) Normal   RT - Pain due to RT None   Cough Absent   Voice changes/stridor/larynx (hoarseness, loss of voice, laryngitis) Normal       CURRENT MEDICATIONS:    Current Outpatient Medications:   •  Ibuprofen (ADVIL) 200 MG Cap, Take  by mouth., Disp: , Rfl:   •  ALPRAZolam (XANAX) 0.25 MG Tab, Take 0.25 mg by mouth at bedtime as needed for Sleep., Disp: , Rfl:   •  HYDROcodone-acetaminophen 2.5-108 mg/5mL (HYCET) 7.5-325 MG/15ML solution, TAKE 15 ML BY MOUTH EVERY 6 HOURS AS NEEDED FOR PAIN FOR 7 DAYS, Disp: , Rfl:   •  chlorhexidine (PERIDEX) 0.12 % Solution, Take 15 mL by mouth as needed., Disp: , Rfl:   •  conjugated estrogen (PREMARIN) 0.625 MG/GM Cream, Insert 0.5 g in vagina 1 time daily as needed., Disp: , Rfl:   •  fluticasone (FLONASE) 50 MCG/ACT nasal spray, Spray 1-2 Sprays in nose 1 time daily as needed (for allergies)., Disp: ,  well-developed. He is not ill-appearing, toxic-appearing or diaphoretic.   HENT:      Head: Normocephalic and atraumatic.      Right Ear: External ear normal.      Left Ear: External ear normal.      Nose: Nose normal.      Neck: Normal range of motion and neck supple.   Eyes:      General: No scleral icterus.        Right eye: No discharge.         Left eye: No discharge.      Extraocular Movements: Extraocular movements intact.      Conjunctiva/sclera: Conjunctivae normal.   Cardiovascular:      Rate and Rhythm: Normal rate. Rhythm irregularly irregular.      Heart sounds: Normal heart sounds. No murmur heard.     No friction rub. No gallop.   Pulmonary:      Effort: Pulmonary effort is normal. No respiratory distress.      Breath sounds: Normal breath sounds. No stridor. No wheezing, rhonchi or rales.   Musculoskeletal:         General: No swelling or tenderness. Normal range of motion.      Right lower leg: No edema.      Left lower leg: No edema.   Skin:     General: Skin is warm and dry.      Coloration: Skin is not pale.      Findings: No erythema or rash.   Neurological:      Mental Status: He is alert. Mental status is at baseline.   Psychiatric:         Mood and Affect: Mood normal.         Behavior: Behavior normal.         Thought Content: Thought content normal.         Judgment: Judgment normal.         Assessment   Problem List Items Addressed This Visit          Symptoms and Signs    Dizziness     Better after stopping Klonopin, so will see how his mood becomes; so far so good.  Needs to watch water intake, also, as he can get orthostatic and is on a diuretic.  Stable for now, so not changing any meds.  Following up with his cardiologist at [facility].          Other Visit Diagnoses       Hospital discharge follow-up    -  Primary    Home therapies and exercises to continue. May do outpatient eventualy at [facility].        Disease/condition/illness specific self-management education was provided to  Rfl:   •  fexofenadine (ALLEGRA ALLERGY) 180 MG tablet, Take 180 mg by mouth every morning., Disp: , Rfl:   •  hydroCHLOROthiazide (HYDRODIURIL) 25 MG Tab, Take 25 mg by mouth every morning., Disp: , Rfl:   •  gabapentin (NEURONTIN) 300 MG CAPS, Take 600 mg by mouth 2 times a day., Disp: , Rfl:     LABORATORY DATA:   Lab Results   Component Value Date/Time    SODIUM 132 (L) 07/01/2021 01:45 PM    POTASSIUM 3.8 07/01/2021 01:45 PM    CHLORIDE 93 (L) 07/01/2021 01:45 PM    CO2 28 07/01/2021 01:45 PM    GLUCOSE 112 (H) 07/01/2021 01:45 PM    BUN 14 07/01/2021 01:45 PM    CREATININE 0.97 07/01/2021 01:45 PM       Lab Results   Component Value Date/Time    WBC 7.0 07/01/2021 01:45 PM    RBC 5.35 07/01/2021 01:45 PM    HEMOGLOBIN 15.4 07/01/2021 01:45 PM    HEMATOCRIT 46.9 07/01/2021 01:45 PM    MCV 87.7 07/01/2021 01:45 PM    MCH 28.8 07/01/2021 01:45 PM    MCHC 32.8 (L) 07/01/2021 01:45 PM    PLATELETCT 256 07/01/2021 01:45 PM         RADIOLOGY DATA:  CT-MAXILLOFACIAL W/O PLUS RECONS    Result Date: 6/30/2021  1.  Minimal chronic sinusitis involving the ethmoid and maxillary sinuses. 2.  No evidence of acute sinusitis. 3.  Left-sided frontal sinus mass containing internal dystrophic calcifications measuring 1.8 x 1.2 x 1.2 cm. Differential diagnosis includes ossifying fibroma, atypical osteoid osteoma, or fibrous dysplasia.    MR-SOFT TISSUE NECK-WITH & W/O    Result Date: 7/23/2021  1.  Post gadolinium sequences demonstrates diffuse mucosal enhancement in the bilateral lateral and posterior pharyngeal wall, posterior tongue and uvula. The uvula is hypertrophied. This findings likely represent posttreatment/postradiation induced inflammation. There is no large mass. Please note the small mucosal lesions cannot be evaluated in this study. 2.  Enlarged left jugulodigastric lymph node consistent with metastatic lymphadenopathy.    AD-[id number] BASE TO MID-THIGH    Result Date: 7/26/2021  1.  Intense uptake in the lateral  the patient. Substantive elements include: specialists follow-up and home exercises.                                                             Patient adherence to his treatment plan was assessed. He is fully adherent with the entire discharge treatment plan.   pharynx on both sides, most likely postoperative healing response.  Residual tumor is difficult to entirely exclude. 2.  Focal uptake in the floor the mouth anteriorly most likely represents salivary pooling. 3.  No evidence for local lymph node involvement or distant metastatic disease. 4.  RIGHT pelvic kidney.      IMPRESSION:  Cancer Staging  Tonsil cancer (HCC)  Staging form: Pharynx - HPV-Mediated Oropharynx, AJCC 8th Edition  - Clinical stage from 7/30/2021: Stage I (cT1, cN1, cM0, p16+) - Signed by Bailee CASTANEDA M.D. on 7/30/2021      PLAN:  No change in treatment plan    Disposition:  Treatment plan and imaging reviewed. Questions answered. Continue therapy outlined.     Bailee CASTANEDA M.D.    No orders of the defined types were placed in this encounter.

## 2024-04-16 ASSESSMENT — LIFESTYLE VARIABLES
TOBACCO_USE: NO
SMOKING_STATUS: NO

## 2024-04-19 ENCOUNTER — HOSPITAL ENCOUNTER (OUTPATIENT)
Dept: RADIATION ONCOLOGY | Facility: MEDICAL CENTER | Age: 89
End: 2024-04-19
Attending: RADIOLOGY
Payer: MEDICARE

## 2024-04-19 VITALS
BODY MASS INDEX: 25.31 KG/M2 | TEMPERATURE: 97.3 F | DIASTOLIC BLOOD PRESSURE: 72 MMHG | OXYGEN SATURATION: 92 % | SYSTOLIC BLOOD PRESSURE: 142 MMHG | HEART RATE: 75 BPM | WEIGHT: 159.17 LBS

## 2024-04-19 PROCEDURE — 99212 OFFICE O/P EST SF 10 MIN: CPT | Mod: 25 | Performed by: RADIOLOGY

## 2024-04-19 PROCEDURE — 31575 DIAGNOSTIC LARYNGOSCOPY: CPT | Performed by: RADIOLOGY

## 2024-04-19 PROCEDURE — 99213 OFFICE O/P EST LOW 20 MIN: CPT | Mod: 25 | Performed by: RADIOLOGY

## 2024-04-19 ASSESSMENT — PAIN SCALES - GENERAL: PAINLEVEL: NO PAIN

## 2024-04-19 NOTE — PROGRESS NOTES
Patient was seen today in clinic with Dr. Byrne for follow up.  Vitals signs and weight were obtained and pain assessment was completed.  Allergies and medications were reviewed with the patient.  Toxicities of treatment assessed.     Vitals/Pain:  Vitals:    04/19/24 1258   BP: (!) 142/72   BP Location: Left arm   Patient Position: Sitting   BP Cuff Size: Adult   Pulse: 75   Temp: 36.3 °C (97.3 °F)   TempSrc: Temporal   SpO2: 92%   Weight: 72.2 kg (159 lb 2.8 oz)   Pain Score: No pain        Allergies:   Shellfish allergy and Garlic    Current Medications:  Current Outpatient Medications   Medication Sig Dispense Refill    levothyroxine (SYNTHROID) 50 MCG Tab       albuterol (PROVENTIL) 2.5mg/3ml Nebu Soln solution for nebulization Take 3 mL by nebulization every four hours as needed for Shortness of Breath (and/or wheezing). 90 mL 0    B Complex-C (VITAMIN B + C COMPLEX PO)       ROPINIRole (REQUIP) 0.5 MG Tab Take 0.5 mg by mouth 3 times a day.      vitamin D3 (CHOLECALCIFEROL) 1000 Unit (25 mcg) Tab Take 1,000 Units by mouth every day.      Ibuprofen 200 MG Cap Take  by mouth.      fluticasone (FLONASE) 50 MCG/ACT nasal spray Spray 1-2 Sprays in nose 1 time daily as needed (for allergies).      fexofenadine (ALLEGRA) 180 MG tablet Take 180 mg by mouth every morning.      hydroCHLOROthiazide (HYDRODIURIL) 25 MG Tab Take 25 mg by mouth every morning.      gabapentin (NEURONTIN) 300 MG Cap Take 600 mg by mouth 4 times a day.       No current facility-administered medications for this encounter.           Maral Hernandez, Med Ass't

## 2024-04-19 NOTE — PROGRESS NOTES
RADIATION ONCOLOGY FOLLOW-UP    Patient name:  Ina Small    Primary Physician:  HENRRY Earl MRN: [id number]  [facility]: [id number]   Care Team:  Patient Care Team:  HENRRY Earl as PCP - General (Family Medicine)  Bailee CASTANEDA M.D. (Radiation Oncology)  Patrick Jackson M.D. (Medical Oncology)  Nori Trevino, SLP as Speech Therapist (Speech Therapy)  Ida Rushing M.D. (Otolaryngology)  : 10/28/1933, 89 y.o.     DATE OF SERVICE:   2024    IDENTIFICATION:   A 89 y.o. female with  Tonsil cancer (HCC)  Staging form: Pharynx - HPV-Mediated Oropharynx, AJCC 8th Edition  - Clinical stage from 2021: Stage I (cT1, cN1, cM0, p16+) - Signed by Bailee CASTANEDA M.D. on 2021  Histopathologic type: Squamous cell carcinoma, NOS  Stage prefix: Initial diagnosis      RADIATION SUMMARY:  Radiation Oncology          10/5/2021 10/11/2021    15:03   Aria Course Treatment Dates   Course First Treatment Date 2021    Course Last Treatment Date 10/05/2021  10/05/2021    Aria Treatment Summary   HN  Plan from Course [id number]   Fraction 35 of 35    35 of 35    Elapsed Course Days 49 @     49 @     Prescribed Fraction Dose 200 cGy    200 cGy    Prescribed Total Dose 7,000 cGy    7,000 cGy    HN cp  Reference Point from Course [id number]   Elapsed Course Days 49 @     49 @     Session Dose 181 cGy    --    Total Dose 6,351 cGy    6,351 cGy    PTV70  Reference Point from Course [id number]   Elapsed Course Days  @     49 @     Session Dose 200 cGy    --    Total Dose 7,000 cGy    7,000 cGy         HISTORY OF PRESENT ILLNESS:   Subjective    [age]-year-old female with past medical history significant for elevated cholesterol, hypertension, peripheral neuropathy, and cataracts.  She presented to ENT in May for work-up of sinus congestion.  While undergoing work-up she was noted to  have abnormal tonsils described as 2+ with a 5 mm papillomatous growth involving the right tonsil.     Removal of the growth was recommended with concern for possible predisposition to cancer.     She underwent bilateral tonsillectomy 7/15/2021.  The right tonsil was negative for malignancy.  The left tonsil demonstrated squamous cell carcinoma.     Follow-up MRI of the neck and soft tissues 7/23/2021 demonstrated a level two jugulodigastric node on the left measuring approximately 2.3 cm.  PET CT scan 7/26/2021 demonstrated bilateral tonsillar uptake considered to be postoperative.  She also had some mild uptake noted in the level two left neck node.  Uptake in the node was not mentioned in the PET/CT report.     Her primary complaint at this time is pharyngitis post tonsillectomy.  She denies otalgia and weight loss.     She has a remote smoking history total 7.5 pack years.  Stopped smoking approximately 50 years ago.     11/18/21 Initial follow-up visit post completion of therapy.  Reports some mild odynophagia left posterior throat.  Is able to eat reports her appetite is not as good as it used to be.  Denies dysphagia.  Xerostomia 6 ( 0-10, 0=completely dry), Taste 6 ( 0-10, 0=no taste), Dysphagia 0, Odynophagia mild     3/15/2022.  Scheduled follow-up visit.  Overall doing well.  No complaints of dysphagia odynophagia.  Does have some xerostomia and impaired taste.  Getting regular dental visits.  Xerostomia 5 ( 0-10, 0=completely dry), Taste 7 ( 0-10, 0=no taste), Dysphagia 0, Odynophagia 0     5/20/2022.  Scheduled follow-up visit.  Overall doing well.  No complaints of dysphagia odynophagia.  Reports her xerostomia and taste are improving.  Had 1 dental implant in her upper arch without problems.  Xerostomia 5 ( 0-10, 0=completely dry), Taste 7 ( 0-10, 0=no taste), Dysphagia 0, Odynophagia 0     7/21/2022.  Scheduled follow-up visit.  Continues to do well.  Stabilization and taste and xerostomia.  No  "dysphagia or odynophagia.  Getting her teeth capped.  Xerostomia 5  ( 0-10, 0=completely dry), Taste 7-8 ( 0-10, 0=no taste), Dysphagia 0, Odynophagia 0     9/13/2022.  Scheduled follow-up visit.  Continues to do well.  Improvement in xerostomia and taste.  No significant dysphagia or odynophagia.  Getting regular dental care.  Xerostomia 8 ( 0-10, 0=completely dry), Taste 7-8 ( 0-10, 0=no taste), Dysphagia 0, Odynophagia 0     1/13/2023.  Scheduled follow-up visit.  Continues to do well.  Getting regular dental care.  Mild improvement in xerostomia and taste.  No significant dysphagia or odynophagia.  Recently was noted to be hypothyroid and started on Synthroid.  Xerostomia 7 ( 0-10, 0=completely dry), Taste 7 on oh ( 0-10, 0=no taste), Dysphagia 0, Odynophagia 0     7/14/2023.  Scheduled follow-up visit.  Doing well.  Getting regular dental care.  Xerostomia and taste stable.  Is started on thyroid medication.  Xerostomia 5 ( 0-10, 0=completely dry), Taste 4 ( 0-10, 0=no taste), Dysphagia 0, Odynophagia 0        INTERVAL HISTORY:  4/19/2024.  Scheduled follow-up visit.  Overall doing well.  Some mild xerostomia and impaired taste.  Managing well.  Getting regular dental care.  Functional Assessment of Cancer Therapy?Head and Neck Radiotherapy measure (FACT-HN_RAD) 0-4  Statement Not at all A little bit Somewhat Quite a bit Very much   My voice has its usual quality and strength     x   I can enjoy the taste of food   x     I can swallow naturally and easily    x    I have pain in my mouth, throat, or neck x       My mouth is dry   x     I am bothered by skin problems x       I am worried about my weight   x     My fatigue keeps me from doing the things I want to do   x     \"Below is a list of statements that other people with your illness said are important.  Please [place] or dayna one number per line to indicate your response as it applies to the past 7 days\"  [facility] Otolaryngol Head Neck Surg. " "doi:[email or web address]    CURRENT MEDICATIONS:  Current Outpatient Medications   Medication Sig Dispense Refill    levothyroxine (SYNTHROID) 50 MCG Tab       albuterol (PROVENTIL) 2.5mg/3ml Nebu Soln solution for nebulization Take 3 mL by nebulization every four hours as needed for Shortness of Breath (and/or wheezing). 90 mL 0    B Complex-C (VITAMIN B + C COMPLEX PO)       ROPINIRole (REQUIP) 0.5 MG Tab Take 0.5 mg by mouth 3 times a day.      vitamin D3 (CHOLECALCIFEROL) 1000 Unit (25 mcg) Tab Take 1,000 Units by mouth every day.      Ibuprofen 200 MG Cap Take  by mouth.      fluticasone (FLONASE) 50 MCG/ACT nasal spray Spray 1-2 Sprays in nose 1 time daily as needed (for allergies).      fexofenadine (ALLEGRA) 180 MG tablet Take 180 mg by mouth every morning.      hydroCHLOROthiazide (HYDRODIURIL) 25 MG Tab Take 25 mg by mouth every morning.      gabapentin (NEURONTIN) 300 MG Cap Take 600 mg by mouth 4 times a day.       No current facility-administered medications for this encounter.       ALLERGIES:  Shellfish allergy and Garlic    REVIEW OF SYSTEMS:    A complete review of system taken. Pertinent items in HPI.  All other negative.    PHYSICAL EXAM:   PERFORMANCE STATUS:       No data to display                  4/19/2024    12:58 PM 7/14/2023     1:05 PM 1/13/2023     1:04 PM 11/25/2022     7:14 PM 9/13/2022     3:06 PM 7/21/2022     2:35 PM 5/20/2022     2:08 PM   Vitals   SYSTOLIC 142 139 129 122 146 138 136   DIASTOLIC 72 73 66 84 99 82 80   Pulse 75 68 66 84 64 82 71   Temperature 36.3 °C (97.3 °F) 36.2 °C (97.2 °F) 36.4 °C (97.6 °F) 38 °C (100.4 °F) 36.1 °C (97 °F) 36.3 °C (97.4 °F) 36.7 °C (98.1 °F)   Respiration    16 18  16   Weight 159.17 158.07 156.75 154 153 153.66 150   Height    1.689 m (5' 6.5\")      BMI 25.31 kg/m2 25.13 kg/m2 24.92 kg/m2 24.48 kg/m2 24.71 kg/m2 24.81 kg/m2 24.22 kg/m2   Pulse Oximetry 92 % 96 % 96 % 92 % 93 % 94 % 97 %       Physical Exam  Vitals and nursing note " reviewed.   Constitutional:       General: She is not in acute distress.     Appearance: She is well-developed.   HENT:      Head: Normocephalic.      Mouth/Throat:      Mouth: Mucous membranes are moist.      Pharynx: Oropharynx is clear. No oropharyngeal exudate or posterior oropharyngeal erythema.   Eyes:      Conjunctiva/sclera: Conjunctivae normal.      Pupils: Pupils are equal, round, and reactive to light.   Cardiovascular:      Rate and Rhythm: Normal rate and regular rhythm.   Pulmonary:      Effort: Pulmonary effort is normal.   Musculoskeletal:         General: Normal range of motion.      Cervical back: Normal range of motion.   Lymphadenopathy:      Cervical: No cervical adenopathy.   Skin:     General: Skin is warm and dry.      Findings: No erythema.   Neurological:      Mental Status: She is alert and oriented to person, place, and time.   Psychiatric:         Behavior: Behavior normal.         Thought Content: Thought content normal.         Judgment: Judgment normal.           FIBEROPTIC EXAM:  Normal exam    LABORATORY DATA:   Lab Results   Component Value Date/Time    WBC 7.0 07/01/2021 01:45 PM    RBC 5.35 07/01/2021 01:45 PM    HEMOGLOBIN 15.4 07/01/2021 01:45 PM    HEMATOCRIT 46.9 07/01/2021 01:45 PM    MCV 87.7 07/01/2021 01:45 PM    MCH 28.8 07/01/2021 01:45 PM    MCHC 32.8 (L) 07/01/2021 01:45 PM    RDW 44.2 07/01/2021 01:45 PM    PLATELETCT 256 07/01/2021 01:45 PM    MPV 11.0 07/01/2021 01:45 PM      Lab Results   Component Value Date/Time    SODIUM 132 (L) 07/01/2021 01:45 PM    POTASSIUM 3.8 07/01/2021 01:45 PM    CHLORIDE 93 (L) 07/01/2021 01:45 PM    CO2 28 07/01/2021 01:45 PM    GLUCOSE 112 (H) 07/01/2021 01:45 PM    BUN 14 07/01/2021 01:45 PM    CREATININE 0.97 07/01/2021 01:45 PM         RADIOLOGY DATA:  No results found.    IMPRESSION:    A 89 y.o. with  Tonsil cancer (HCC)  Staging form: Pharynx - HPV-Mediated Oropharynx, AJCC 8th Edition  - Clinical stage from 7/30/2021: Stage I  (cT1, cN1, cM0, p16+) - Signed by Bailee CASTANEDA M.D. on 7/30/2021  Histopathologic type: Squamous cell carcinoma, NOS  Stage prefix: Initial diagnosis      CANCER STATUS:  No Evidence of Disease    RECOMMENDATIONS:   Reviewed exam findings with her.  Reassured her.  Will see her back in 1 year.      Thank you for the opportunity to participate in her care.  If any questions or comments, please do not hesitate in calling.      No orders of the defined types were placed in this encounter.

## 2024-04-19 NOTE — PROCEDURES
DATE OF SERVICE: 4/19/2024         FIBEROPTIC NASO-PHARYNGOSCOPY NOTE      Flexible fiberoptic exam was performed after anesthesia of left nostril and oropharynx.    Nasopharynx:       R. Eustachian canal opening, torus, fossa of Rosenmuller appear normal  L. Eustachian canal opening, torus, fossa of Rosenmuller appear normal      Oropharynx:        Base of tongue normal appearing.  Vallecula normal appearing.  Epiglottis normal appearing.  PE folds normal appearing.    Larynx:      R. AE fold, false vocal fold, arytenoid appear normal  L. AE fold, false vocal fold, arytenoid appear normal  R. Cord mobile  L. Cord mobile   R. Piriform sinus appears normal  L. Piriform sinus appears normal      Bailee CASTANEDA M.D.  Electronically signed by: Bailee CASTANEDA M.D., 4/19/2024 1:30 PM  [phone]

## 2024-04-23 ENCOUNTER — APPOINTMENT (RX ONLY)
Dept: URBAN - METROPOLITAN AREA CLINIC 15 | Facility: CLINIC | Age: 89
Setting detail: DERMATOLOGY
End: 2024-04-23

## 2024-04-23 DIAGNOSIS — L82.1 OTHER SEBORRHEIC KERATOSIS: ICD-10-CM

## 2024-04-23 DIAGNOSIS — D22 MELANOCYTIC NEVI: ICD-10-CM

## 2024-04-23 DIAGNOSIS — D18.0 HEMANGIOMA: ICD-10-CM

## 2024-04-23 DIAGNOSIS — D485 NEOPLASM OF UNCERTAIN BEHAVIOR OF SKIN: ICD-10-CM

## 2024-04-23 PROBLEM — D48.5 NEOPLASM OF UNCERTAIN BEHAVIOR OF SKIN: Status: ACTIVE | Noted: 2024-04-23

## 2024-04-23 PROBLEM — D22.5 MELANOCYTIC NEVI OF TRUNK: Status: ACTIVE | Noted: 2024-04-23

## 2024-04-23 PROBLEM — D18.01 HEMANGIOMA OF SKIN AND SUBCUTANEOUS TISSUE: Status: ACTIVE | Noted: 2024-04-23

## 2024-04-23 PROCEDURE — 99203 OFFICE O/P NEW LOW 30 MIN: CPT | Mod: 25

## 2024-04-23 PROCEDURE — ? BIOPSY BY SHAVE METHOD

## 2024-04-23 PROCEDURE — ? COUNSELING

## 2024-04-23 PROCEDURE — 11102 TANGNTL BX SKIN SINGLE LES: CPT

## 2024-04-23 ASSESSMENT — LOCATION SIMPLE DESCRIPTION DERM
LOCATION SIMPLE: RIGHT CHEEK
LOCATION SIMPLE: LEFT UPPER BACK
LOCATION SIMPLE: RIGHT POSTERIOR UPPER ARM
LOCATION SIMPLE: LOWER BACK
LOCATION SIMPLE: LEFT NOSE

## 2024-04-23 ASSESSMENT — LOCATION ZONE DERM
LOCATION ZONE: FACE
LOCATION ZONE: TRUNK
LOCATION ZONE: NOSE
LOCATION ZONE: ARM

## 2024-04-23 ASSESSMENT — LOCATION DETAILED DESCRIPTION DERM
LOCATION DETAILED: RIGHT DISTAL POSTERIOR UPPER ARM
LOCATION DETAILED: LEFT INFERIOR UPPER BACK
LOCATION DETAILED: LEFT NASAL SIDEWALL
LOCATION DETAILED: SUPERIOR LUMBAR SPINE
LOCATION DETAILED: RIGHT MEDIAL MALAR CHEEK

## 2024-05-02 ENCOUNTER — APPOINTMENT (RX ONLY)
Dept: URBAN - METROPOLITAN AREA CLINIC 15 | Facility: CLINIC | Age: 89
Setting detail: DERMATOLOGY
End: 2024-05-02

## 2024-05-02 PROBLEM — C44.612 BASAL CELL CARCINOMA OF SKIN OF RIGHT UPPER LIMB, INCLUDING SHOULDER: Status: ACTIVE | Noted: 2024-05-02

## 2024-05-02 PROCEDURE — 17262 DSTRJ MAL LES T/A/L 1.1-2.0: CPT

## 2024-05-02 PROCEDURE — ? CURETTAGE AND DESTRUCTION

## 2024-05-02 NOTE — PROCEDURE: CURETTAGE AND DESTRUCTION
Detail Level: Detailed
Biopsy Photograph Reviewed: Yes
Number Of Curettages: 1
Size Of Lesion In Cm: 1.2
Size Of Lesion After Curettage: 1.3
Add Intralesional Injection: No
Concentration (Mg/Ml Or Millions Of Plaque Forming Units/Cc): 0.01
Anesthesia Type: 1% lidocaine with epinephrine
Anesthesia Volume In Cc: 3
Cautery Type: electrodesiccation
What Was Performed First?: Curettage
Final Size Statement: The size of the lesion after curettage was
Additional Information: (Optional): The wound was cleaned, and a pressure dressing was applied.  The patient received detailed post-op instructions.
Consent was obtained from the patient. The risks, benefits and alternatives to therapy were discussed in detail. Specifically, the risks of infection, scarring, bleeding, prolonged wound healing, nerve injury, incomplete removal, allergy to anesthesia and recurrence were addressed. Alternatives to ED&C, such as: surgical removal and XRT were also discussed.  Prior to the procedure, the treatment site was clearly identified and confirmed by the patient. All components of Universal Protocol/PAUSE Rule completed.
Post-Care Instructions: I reviewed with the patient in detail post-care instructions. Patient is to keep the area dry for 48 hours, and not to engage in any swimming until the area is healed. Should the patient develop any fevers, chills, bleeding, severe pain patient will contact the office immediately.
Bill As A Line Item Or As Units: Line Item

## 2024-07-16 ENCOUNTER — HOSPITAL ENCOUNTER (INPATIENT)
Facility: MEDICAL CENTER | Age: 89
LOS: 1 days | DRG: 641 | End: 2024-07-17
Attending: EMERGENCY MEDICINE | Admitting: HOSPITALIST
Payer: MEDICARE

## 2024-07-16 DIAGNOSIS — E87.1 HYPONATREMIA: ICD-10-CM

## 2024-07-16 DIAGNOSIS — U07.1 COVID-19: ICD-10-CM

## 2024-07-16 PROBLEM — Z71.89 ADVANCE CARE PLANNING: Status: ACTIVE | Noted: 2024-07-16

## 2024-07-16 PROBLEM — E46 MALNUTRITION (HCC): Status: ACTIVE | Noted: 2024-07-16

## 2024-07-16 PROBLEM — E86.0 DEHYDRATION: Status: ACTIVE | Noted: 2024-07-16

## 2024-07-16 PROBLEM — I10 PRIMARY HYPERTENSION: Status: ACTIVE | Noted: 2024-07-16

## 2024-07-16 LAB
ALBUMIN SERPL BCP-MCNC: 3.9 G/DL (ref 3.2–4.9)
ALBUMIN/GLOB SERPL: 1.6 G/DL
ALP SERPL-CCNC: 67 U/L (ref 30–99)
ALT SERPL-CCNC: 9 U/L (ref 2–50)
ANION GAP SERPL CALC-SCNC: 12 MMOL/L (ref 7–16)
ANION GAP SERPL CALC-SCNC: 13 MMOL/L (ref 7–16)
AST SERPL-CCNC: 19 U/L (ref 12–45)
BASOPHILS # BLD AUTO: 0.2 % (ref 0–1.8)
BASOPHILS # BLD: 0.01 K/UL (ref 0–0.12)
BILIRUB SERPL-MCNC: 0.8 MG/DL (ref 0.1–1.5)
BUN SERPL-MCNC: 11 MG/DL (ref 8–22)
BUN SERPL-MCNC: 13 MG/DL (ref 8–22)
CALCIUM ALBUM COR SERPL-MCNC: 9.4 MG/DL (ref 8.5–10.5)
CALCIUM SERPL-MCNC: 9.1 MG/DL (ref 8.4–10.2)
CALCIUM SERPL-MCNC: 9.3 MG/DL (ref 8.4–10.2)
CHLORIDE SERPL-SCNC: 87 MMOL/L (ref 96–112)
CHLORIDE SERPL-SCNC: 88 MMOL/L (ref 96–112)
CO2 SERPL-SCNC: 22 MMOL/L (ref 20–33)
CO2 SERPL-SCNC: 23 MMOL/L (ref 20–33)
CREAT SERPL-MCNC: 0.75 MG/DL (ref 0.5–1.4)
CREAT SERPL-MCNC: 0.78 MG/DL (ref 0.5–1.4)
EOSINOPHIL # BLD AUTO: 0.04 K/UL (ref 0–0.51)
EOSINOPHIL NFR BLD: 0.8 % (ref 0–6.9)
ERYTHROCYTE [DISTWIDTH] IN BLOOD BY AUTOMATED COUNT: 41.4 FL (ref 35.9–50)
GFR SERPLBLD CREATININE-BSD FMLA CKD-EPI: 72 ML/MIN/1.73 M 2
GFR SERPLBLD CREATININE-BSD FMLA CKD-EPI: 75 ML/MIN/1.73 M 2
GLOBULIN SER CALC-MCNC: 2.5 G/DL (ref 1.9–3.5)
GLUCOSE SERPL-MCNC: 116 MG/DL (ref 65–99)
GLUCOSE SERPL-MCNC: 117 MG/DL (ref 65–99)
HCT VFR BLD AUTO: 46.2 % (ref 37–47)
HGB BLD-MCNC: 15.9 G/DL (ref 12–16)
IMM GRANULOCYTES # BLD AUTO: 0.02 K/UL (ref 0–0.11)
IMM GRANULOCYTES NFR BLD AUTO: 0.4 % (ref 0–0.9)
LYMPHOCYTES # BLD AUTO: 1.19 K/UL (ref 1–4.8)
LYMPHOCYTES NFR BLD: 24.2 % (ref 22–41)
MCH RBC QN AUTO: 28.8 PG (ref 27–33)
MCHC RBC AUTO-ENTMCNC: 34.4 G/DL (ref 32.2–35.5)
MCV RBC AUTO: 83.7 FL (ref 81.4–97.8)
MONOCYTES # BLD AUTO: 0.35 K/UL (ref 0–0.85)
MONOCYTES NFR BLD AUTO: 7.1 % (ref 0–13.4)
NEUTROPHILS # BLD AUTO: 3.31 K/UL (ref 1.82–7.42)
NEUTROPHILS NFR BLD: 67.3 % (ref 44–72)
NRBC # BLD AUTO: 0 K/UL
NRBC BLD-RTO: 0 /100 WBC (ref 0–0.2)
PLATELET # BLD AUTO: 265 K/UL (ref 164–446)
PMV BLD AUTO: 9.4 FL (ref 9–12.9)
POTASSIUM SERPL-SCNC: 3.7 MMOL/L (ref 3.6–5.5)
POTASSIUM SERPL-SCNC: 4.1 MMOL/L (ref 3.6–5.5)
PROT SERPL-MCNC: 6.4 G/DL (ref 6–8.2)
RBC # BLD AUTO: 5.52 M/UL (ref 4.2–5.4)
SODIUM SERPL-SCNC: 122 MMOL/L (ref 135–145)
SODIUM SERPL-SCNC: 123 MMOL/L (ref 135–145)
WBC # BLD AUTO: 4.9 K/UL (ref 4.8–10.8)

## 2024-07-16 PROCEDURE — 99223 1ST HOSP IP/OBS HIGH 75: CPT | Mod: 25,AI | Performed by: HOSPITALIST

## 2024-07-16 PROCEDURE — 700105 HCHG RX REV CODE 258: Performed by: HOSPITALIST

## 2024-07-16 PROCEDURE — 700102 HCHG RX REV CODE 250 W/ 637 OVERRIDE(OP): Performed by: HOSPITALIST

## 2024-07-16 PROCEDURE — 36415 COLL VENOUS BLD VENIPUNCTURE: CPT

## 2024-07-16 PROCEDURE — 94760 N-INVAS EAR/PLS OXIMETRY 1: CPT

## 2024-07-16 PROCEDURE — 80053 COMPREHEN METABOLIC PANEL: CPT

## 2024-07-16 PROCEDURE — 99285 EMERGENCY DEPT VISIT HI MDM: CPT

## 2024-07-16 PROCEDURE — 80048 BASIC METABOLIC PNL TOTAL CA: CPT

## 2024-07-16 PROCEDURE — 99497 ADVNCD CARE PLAN 30 MIN: CPT | Performed by: HOSPITALIST

## 2024-07-16 PROCEDURE — 770001 HCHG ROOM/CARE - MED/SURG/GYN PRIV*

## 2024-07-16 PROCEDURE — 85025 COMPLETE CBC W/AUTO DIFF WBC: CPT

## 2024-07-16 PROCEDURE — A9270 NON-COVERED ITEM OR SERVICE: HCPCS | Performed by: HOSPITALIST

## 2024-07-16 RX ORDER — AMOXICILLIN 250 MG
2 CAPSULE ORAL 2 TIMES DAILY
Status: DISCONTINUED | OUTPATIENT
Start: 2024-07-16 | End: 2024-07-17 | Stop reason: HOSPADM

## 2024-07-16 RX ORDER — NIRMATRELVIR AND RITONAVIR 300-100 MG
KIT ORAL SEE ADMIN INSTRUCTIONS
Status: ON HOLD | COMMUNITY
End: 2024-07-17

## 2024-07-16 RX ORDER — HYDRALAZINE HYDROCHLORIDE 20 MG/ML
10 INJECTION INTRAMUSCULAR; INTRAVENOUS EVERY 4 HOURS PRN
Status: DISCONTINUED | OUTPATIENT
Start: 2024-07-16 | End: 2024-07-17 | Stop reason: HOSPADM

## 2024-07-16 RX ORDER — ONDANSETRON 4 MG/1
4 TABLET, ORALLY DISINTEGRATING ORAL EVERY 4 HOURS PRN
Status: DISCONTINUED | OUTPATIENT
Start: 2024-07-16 | End: 2024-07-17 | Stop reason: HOSPADM

## 2024-07-16 RX ORDER — SODIUM CHLORIDE 9 MG/ML
INJECTION, SOLUTION INTRAVENOUS CONTINUOUS
Status: ACTIVE | OUTPATIENT
Start: 2024-07-16 | End: 2024-07-17

## 2024-07-16 RX ORDER — IBUPROFEN 200 MG
400 TABLET ORAL EVERY 6 HOURS PRN
COMMUNITY

## 2024-07-16 RX ORDER — LEVOTHYROXINE SODIUM 0.05 MG/1
50 TABLET ORAL EVERY EVENING
Status: DISCONTINUED | OUTPATIENT
Start: 2024-07-16 | End: 2024-07-17 | Stop reason: HOSPADM

## 2024-07-16 RX ORDER — GABAPENTIN 300 MG/1
600 CAPSULE ORAL 3 TIMES DAILY
Status: DISCONTINUED | OUTPATIENT
Start: 2024-07-16 | End: 2024-07-16

## 2024-07-16 RX ORDER — POLYETHYLENE GLYCOL 3350 17 G/17G
1 POWDER, FOR SOLUTION ORAL
Status: DISCONTINUED | OUTPATIENT
Start: 2024-07-16 | End: 2024-07-17 | Stop reason: HOSPADM

## 2024-07-16 RX ORDER — ONDANSETRON 2 MG/ML
4 INJECTION INTRAMUSCULAR; INTRAVENOUS EVERY 4 HOURS PRN
Status: DISCONTINUED | OUTPATIENT
Start: 2024-07-16 | End: 2024-07-17 | Stop reason: HOSPADM

## 2024-07-16 RX ORDER — ENOXAPARIN SODIUM 100 MG/ML
40 INJECTION SUBCUTANEOUS DAILY
Status: DISCONTINUED | OUTPATIENT
Start: 2024-07-17 | End: 2024-07-17 | Stop reason: HOSPADM

## 2024-07-16 RX ORDER — GABAPENTIN 300 MG/1
300 CAPSULE ORAL 3 TIMES DAILY
Status: DISCONTINUED | OUTPATIENT
Start: 2024-07-16 | End: 2024-07-17

## 2024-07-16 RX ORDER — OXYCODONE HYDROCHLORIDE 5 MG/1
2.5 TABLET ORAL
Status: DISCONTINUED | OUTPATIENT
Start: 2024-07-16 | End: 2024-07-17 | Stop reason: HOSPADM

## 2024-07-16 RX ORDER — LOSARTAN POTASSIUM AND HYDROCHLOROTHIAZIDE 12.5; 5 MG/1; MG/1
1 TABLET ORAL DAILY
Status: ON HOLD | COMMUNITY
End: 2024-07-17

## 2024-07-16 RX ORDER — ROPINIROLE 0.5 MG/1
0.5 TABLET, FILM COATED ORAL NIGHTLY PRN
Status: DISCONTINUED | OUTPATIENT
Start: 2024-07-16 | End: 2024-07-17 | Stop reason: HOSPADM

## 2024-07-16 RX ORDER — ACETAMINOPHEN 325 MG/1
650 TABLET ORAL EVERY 6 HOURS PRN
Status: DISCONTINUED | OUTPATIENT
Start: 2024-07-16 | End: 2024-07-17 | Stop reason: HOSPADM

## 2024-07-16 RX ORDER — OXYCODONE HYDROCHLORIDE 5 MG/1
5 TABLET ORAL
Status: DISCONTINUED | OUTPATIENT
Start: 2024-07-16 | End: 2024-07-17 | Stop reason: HOSPADM

## 2024-07-16 RX ORDER — HYDROMORPHONE HYDROCHLORIDE 1 MG/ML
0.25 INJECTION, SOLUTION INTRAMUSCULAR; INTRAVENOUS; SUBCUTANEOUS
Status: DISCONTINUED | OUTPATIENT
Start: 2024-07-16 | End: 2024-07-17 | Stop reason: HOSPADM

## 2024-07-16 RX ADMIN — ACETAMINOPHEN 650 MG: 325 TABLET ORAL at 20:30

## 2024-07-16 RX ADMIN — SODIUM CHLORIDE: 9 INJECTION, SOLUTION INTRAVENOUS at 23:21

## 2024-07-16 RX ADMIN — ROPINIROLE HYDROCHLORIDE 0.5 MG: 0.5 TABLET, FILM COATED ORAL at 23:18

## 2024-07-16 RX ADMIN — SODIUM CHLORIDE: 9 INJECTION, SOLUTION INTRAVENOUS at 16:30

## 2024-07-16 RX ADMIN — GABAPENTIN 600 MG: 300 CAPSULE ORAL at 18:08

## 2024-07-16 SDOH — ECONOMIC STABILITY: TRANSPORTATION INSECURITY
IN THE PAST 12 MONTHS, HAS THE LACK OF TRANSPORTATION KEPT YOU FROM MEDICAL APPOINTMENTS OR FROM GETTING MEDICATIONS?: NO

## 2024-07-16 SDOH — ECONOMIC STABILITY: TRANSPORTATION INSECURITY
IN THE PAST 12 MONTHS, HAS LACK OF RELIABLE TRANSPORTATION KEPT YOU FROM MEDICAL APPOINTMENTS, MEETINGS, WORK OR FROM GETTING THINGS NEEDED FOR DAILY LIVING?: NO

## 2024-07-16 ASSESSMENT — PAIN DESCRIPTION - PAIN TYPE
TYPE: ACUTE PAIN

## 2024-07-16 ASSESSMENT — COGNITIVE AND FUNCTIONAL STATUS - GENERAL
MOBILITY SCORE: 23
DAILY ACTIVITIY SCORE: 24
WALKING IN HOSPITAL ROOM: A LITTLE
SUGGESTED CMS G CODE MODIFIER MOBILITY: CI
SUGGESTED CMS G CODE MODIFIER DAILY ACTIVITY: CH

## 2024-07-16 ASSESSMENT — PATIENT HEALTH QUESTIONNAIRE - PHQ9
1. LITTLE INTEREST OR PLEASURE IN DOING THINGS: NOT AT ALL
SUM OF ALL RESPONSES TO PHQ9 QUESTIONS 1 AND 2: 0
2. FEELING DOWN, DEPRESSED, IRRITABLE, OR HOPELESS: NOT AT ALL

## 2024-07-16 ASSESSMENT — ENCOUNTER SYMPTOMS
ABDOMINAL PAIN: 0
COUGH: 0
EYE DISCHARGE: 0
CHILLS: 0
FLANK PAIN: 0
EYE REDNESS: 0
FOCAL WEAKNESS: 0
NERVOUS/ANXIOUS: 0
MYALGIAS: 0
SHORTNESS OF BREATH: 0
NAUSEA: 1
FEVER: 0
BRUISES/BLEEDS EASILY: 0
STRIDOR: 0
VOMITING: 0

## 2024-07-16 ASSESSMENT — SOCIAL DETERMINANTS OF HEALTH (SDOH)
IN THE PAST 12 MONTHS, HAS THE ELECTRIC, GAS, OIL, OR WATER COMPANY THREATENED TO SHUT OFF SERVICE IN YOUR HOME?: NO
WITHIN THE PAST 12 MONTHS, YOU WORRIED THAT YOUR FOOD WOULD RUN OUT BEFORE YOU GOT THE MONEY TO BUY MORE: NEVER TRUE
WITHIN THE PAST 12 MONTHS, THE FOOD YOU BOUGHT JUST DIDN'T LAST AND YOU DIDN'T HAVE MONEY TO GET MORE: NEVER TRUE
WITHIN THE LAST YEAR, HAVE YOU BEEN HUMILIATED OR EMOTIONALLY ABUSED IN OTHER WAYS BY YOUR PARTNER OR EX-PARTNER?: NO
WITHIN THE LAST YEAR, HAVE YOU BEEN AFRAID OF YOUR PARTNER OR EX-PARTNER?: NO
WITHIN THE LAST YEAR, HAVE TO BEEN RAPED OR FORCED TO HAVE ANY KIND OF SEXUAL ACTIVITY BY YOUR PARTNER OR EX-PARTNER?: NO
WITHIN THE LAST YEAR, HAVE YOU BEEN KICKED, HIT, SLAPPED, OR OTHERWISE PHYSICALLY HURT BY YOUR PARTNER OR EX-PARTNER?: NO

## 2024-07-16 ASSESSMENT — LIFESTYLE VARIABLES
TOTAL SCORE: 0
HOW MANY TIMES IN THE PAST YEAR HAVE YOU HAD 5 OR MORE DRINKS IN A DAY: 0
HAVE PEOPLE ANNOYED YOU BY CRITICIZING YOUR DRINKING: NO
ON A TYPICAL DAY WHEN YOU DRINK ALCOHOL HOW MANY DRINKS DO YOU HAVE: 0
CONSUMPTION TOTAL: NEGATIVE
DOES PATIENT WANT TO STOP DRINKING: NO
ALCOHOL_USE: NO
AVERAGE NUMBER OF DAYS PER WEEK YOU HAVE A DRINK CONTAINING ALCOHOL: 0
HAVE YOU EVER FELT YOU SHOULD CUT DOWN ON YOUR DRINKING: NO
TOTAL SCORE: 0
EVER FELT BAD OR GUILTY ABOUT YOUR DRINKING: NO
EVER HAD A DRINK FIRST THING IN THE MORNING TO STEADY YOUR NERVES TO GET RID OF A HANGOVER: NO
TOTAL SCORE: 0

## 2024-07-16 ASSESSMENT — FIBROSIS 4 INDEX: FIB4 SCORE: 2.15

## 2024-07-17 VITALS
HEART RATE: 70 BPM | RESPIRATION RATE: 16 BRPM | HEIGHT: 67 IN | DIASTOLIC BLOOD PRESSURE: 76 MMHG | SYSTOLIC BLOOD PRESSURE: 145 MMHG | BODY MASS INDEX: 23.98 KG/M2 | WEIGHT: 152.78 LBS | TEMPERATURE: 97.5 F | OXYGEN SATURATION: 92 %

## 2024-07-17 LAB
ALBUMIN SERPL BCP-MCNC: 3.6 G/DL (ref 3.2–4.9)
ALBUMIN/GLOB SERPL: 1.8 G/DL
ALP SERPL-CCNC: 56 U/L (ref 30–99)
ALT SERPL-CCNC: 11 U/L (ref 2–50)
ANION GAP SERPL CALC-SCNC: 10 MMOL/L (ref 7–16)
ANION GAP SERPL CALC-SCNC: 10 MMOL/L (ref 7–16)
ANION GAP SERPL CALC-SCNC: 11 MMOL/L (ref 7–16)
AST SERPL-CCNC: 17 U/L (ref 12–45)
BILIRUB SERPL-MCNC: 0.8 MG/DL (ref 0.1–1.5)
BUN SERPL-MCNC: 10 MG/DL (ref 8–22)
BUN SERPL-MCNC: 11 MG/DL (ref 8–22)
BUN SERPL-MCNC: 8 MG/DL (ref 8–22)
CALCIUM ALBUM COR SERPL-MCNC: 9.3 MG/DL (ref 8.5–10.5)
CALCIUM SERPL-MCNC: 8.6 MG/DL (ref 8.4–10.2)
CALCIUM SERPL-MCNC: 9 MG/DL (ref 8.4–10.2)
CALCIUM SERPL-MCNC: 9.2 MG/DL (ref 8.4–10.2)
CHLORIDE SERPL-SCNC: 91 MMOL/L (ref 96–112)
CHLORIDE SERPL-SCNC: 93 MMOL/L (ref 96–112)
CHLORIDE SERPL-SCNC: 94 MMOL/L (ref 96–112)
CO2 SERPL-SCNC: 21 MMOL/L (ref 20–33)
CO2 SERPL-SCNC: 22 MMOL/L (ref 20–33)
CO2 SERPL-SCNC: 24 MMOL/L (ref 20–33)
CREAT SERPL-MCNC: 0.6 MG/DL (ref 0.5–1.4)
CREAT SERPL-MCNC: 0.71 MG/DL (ref 0.5–1.4)
CREAT SERPL-MCNC: 0.76 MG/DL (ref 0.5–1.4)
ERYTHROCYTE [DISTWIDTH] IN BLOOD BY AUTOMATED COUNT: 41.6 FL (ref 35.9–50)
GFR SERPLBLD CREATININE-BSD FMLA CKD-EPI: 74 ML/MIN/1.73 M 2
GFR SERPLBLD CREATININE-BSD FMLA CKD-EPI: 80 ML/MIN/1.73 M 2
GFR SERPLBLD CREATININE-BSD FMLA CKD-EPI: 85 ML/MIN/1.73 M 2
GLOBULIN SER CALC-MCNC: 2 G/DL (ref 1.9–3.5)
GLUCOSE SERPL-MCNC: 100 MG/DL (ref 65–99)
GLUCOSE SERPL-MCNC: 111 MG/DL (ref 65–99)
GLUCOSE SERPL-MCNC: 119 MG/DL (ref 65–99)
HCT VFR BLD AUTO: 42.5 % (ref 37–47)
HGB BLD-MCNC: 14.5 G/DL (ref 12–16)
MAGNESIUM SERPL-MCNC: 2 MG/DL (ref 1.5–2.5)
MCH RBC QN AUTO: 28.7 PG (ref 27–33)
MCHC RBC AUTO-ENTMCNC: 34.1 G/DL (ref 32.2–35.5)
MCV RBC AUTO: 84.2 FL (ref 81.4–97.8)
PHOSPHATE SERPL-MCNC: 2.9 MG/DL (ref 2.5–4.5)
PLATELET # BLD AUTO: 226 K/UL (ref 164–446)
PMV BLD AUTO: 9.2 FL (ref 9–12.9)
POTASSIUM SERPL-SCNC: 4.1 MMOL/L (ref 3.6–5.5)
POTASSIUM SERPL-SCNC: 4.3 MMOL/L (ref 3.6–5.5)
POTASSIUM SERPL-SCNC: 4.4 MMOL/L (ref 3.6–5.5)
PROT SERPL-MCNC: 5.6 G/DL (ref 6–8.2)
RBC # BLD AUTO: 5.05 M/UL (ref 4.2–5.4)
SODIUM SERPL-SCNC: 125 MMOL/L (ref 135–145)
SODIUM SERPL-SCNC: 125 MMOL/L (ref 135–145)
SODIUM SERPL-SCNC: 126 MMOL/L (ref 135–145)
WBC # BLD AUTO: 4.2 K/UL (ref 4.8–10.8)

## 2024-07-17 PROCEDURE — 36415 COLL VENOUS BLD VENIPUNCTURE: CPT

## 2024-07-17 PROCEDURE — 94760 N-INVAS EAR/PLS OXIMETRY 1: CPT

## 2024-07-17 PROCEDURE — A9270 NON-COVERED ITEM OR SERVICE: HCPCS | Performed by: INTERNAL MEDICINE

## 2024-07-17 PROCEDURE — 85027 COMPLETE CBC AUTOMATED: CPT

## 2024-07-17 PROCEDURE — 99239 HOSP IP/OBS DSCHRG MGMT >30: CPT | Performed by: INTERNAL MEDICINE

## 2024-07-17 PROCEDURE — 80048 BASIC METABOLIC PNL TOTAL CA: CPT

## 2024-07-17 PROCEDURE — 83735 ASSAY OF MAGNESIUM: CPT

## 2024-07-17 PROCEDURE — 700102 HCHG RX REV CODE 250 W/ 637 OVERRIDE(OP): Performed by: HOSPITALIST

## 2024-07-17 PROCEDURE — 700102 HCHG RX REV CODE 250 W/ 637 OVERRIDE(OP): Performed by: INTERNAL MEDICINE

## 2024-07-17 PROCEDURE — 84100 ASSAY OF PHOSPHORUS: CPT

## 2024-07-17 PROCEDURE — 80053 COMPREHEN METABOLIC PANEL: CPT

## 2024-07-17 PROCEDURE — A9270 NON-COVERED ITEM OR SERVICE: HCPCS | Performed by: HOSPITALIST

## 2024-07-17 PROCEDURE — 700105 HCHG RX REV CODE 258: Performed by: INTERNAL MEDICINE

## 2024-07-17 RX ORDER — FEXOFENADINE HCL 60 MG/1
180 TABLET, FILM COATED ORAL EVERY MORNING
Status: DISCONTINUED | OUTPATIENT
Start: 2024-07-17 | End: 2024-07-17 | Stop reason: HOSPADM

## 2024-07-17 RX ORDER — GABAPENTIN 300 MG/1
600 CAPSULE ORAL 3 TIMES DAILY
Status: DISCONTINUED | OUTPATIENT
Start: 2024-07-17 | End: 2024-07-17 | Stop reason: HOSPADM

## 2024-07-17 RX ORDER — LOSARTAN POTASSIUM 50 MG/1
50 TABLET ORAL DAILY
Qty: 14 TABLET | Refills: 0 | Status: SHIPPED | OUTPATIENT
Start: 2024-07-17 | End: 2024-07-31

## 2024-07-17 RX ORDER — LOSARTAN POTASSIUM 50 MG/1
50 TABLET ORAL
Status: DISCONTINUED | OUTPATIENT
Start: 2024-07-17 | End: 2024-07-17 | Stop reason: HOSPADM

## 2024-07-17 RX ORDER — SODIUM CHLORIDE 9 MG/ML
INJECTION, SOLUTION INTRAVENOUS CONTINUOUS
Status: DISCONTINUED | OUTPATIENT
Start: 2024-07-17 | End: 2024-07-17 | Stop reason: HOSPADM

## 2024-07-17 RX ADMIN — GABAPENTIN 600 MG: 300 CAPSULE ORAL at 15:16

## 2024-07-17 RX ADMIN — FEXOFENADINE HCL 180 MG: 60 TABLET, FILM COATED ORAL at 11:02

## 2024-07-17 RX ADMIN — GABAPENTIN 300 MG: 300 CAPSULE ORAL at 08:38

## 2024-07-17 RX ADMIN — LOSARTAN POTASSIUM 50 MG: 50 TABLET, FILM COATED ORAL at 11:02

## 2024-07-17 RX ADMIN — SODIUM CHLORIDE: 9 INJECTION, SOLUTION INTRAVENOUS at 09:13

## 2024-07-17 RX ADMIN — LEVOTHYROXINE SODIUM 50 MCG: 0.05 TABLET ORAL at 06:18

## 2024-07-17 RX ADMIN — ACETAMINOPHEN 650 MG: 325 TABLET ORAL at 03:30

## 2024-07-17 ASSESSMENT — PAIN DESCRIPTION - PAIN TYPE
TYPE: ACUTE PAIN

## 2024-07-18 ENCOUNTER — TELEPHONE (OUTPATIENT)
Dept: HEALTH INFORMATION MANAGEMENT | Facility: OTHER | Age: 89
End: 2024-07-18
Payer: MEDICARE

## 2024-10-22 ENCOUNTER — APPOINTMENT (RX ONLY)
Dept: URBAN - METROPOLITAN AREA CLINIC 6 | Facility: CLINIC | Age: 89
Setting detail: DERMATOLOGY
End: 2024-10-22

## 2024-10-22 DIAGNOSIS — Z86.007 PERSONAL HISTORY OF IN-SITU NEOPLASM OF SKIN: ICD-10-CM

## 2024-10-22 DIAGNOSIS — D22 MELANOCYTIC NEVI: ICD-10-CM

## 2024-10-22 DIAGNOSIS — Z71.89 OTHER SPECIFIED COUNSELING: ICD-10-CM

## 2024-10-22 DIAGNOSIS — L81.4 OTHER MELANIN HYPERPIGMENTATION: ICD-10-CM

## 2024-10-22 DIAGNOSIS — S90.1 CONTUSION OF TOE WITHOUT DAMAGE TO NAIL: ICD-10-CM

## 2024-10-22 DIAGNOSIS — D18.0 HEMANGIOMA: ICD-10-CM

## 2024-10-22 DIAGNOSIS — L82.1 OTHER SEBORRHEIC KERATOSIS: ICD-10-CM

## 2024-10-22 PROBLEM — D18.01 HEMANGIOMA OF SKIN AND SUBCUTANEOUS TISSUE: Status: ACTIVE | Noted: 2024-10-22

## 2024-10-22 PROBLEM — S90.111A CONTUSION OF RIGHT GREAT TOE WITHOUT DAMAGE TO NAIL, INITIAL ENCOUNTER: Status: ACTIVE | Noted: 2024-10-22

## 2024-10-22 PROBLEM — D22.5 MELANOCYTIC NEVI OF TRUNK: Status: ACTIVE | Noted: 2024-10-22

## 2024-10-22 PROCEDURE — ? COUNSELING

## 2024-10-22 PROCEDURE — 99213 OFFICE O/P EST LOW 20 MIN: CPT

## 2024-10-22 PROCEDURE — ? DIAGNOSIS COMMENT

## 2024-10-22 ASSESSMENT — LOCATION DETAILED DESCRIPTION DERM
LOCATION DETAILED: RIGHT LATERAL ELBOW SKIN
LOCATION DETAILED: RIGHT GREAT TOENAIL
LOCATION DETAILED: RIGHT MID-UPPER BACK
LOCATION DETAILED: LEFT MEDIAL SUPERIOR CHEST
LOCATION DETAILED: RIGHT INFERIOR UPPER BACK
LOCATION DETAILED: RIGHT SUPERIOR MEDIAL UPPER BACK
LOCATION DETAILED: UPPER STERNUM

## 2024-10-22 ASSESSMENT — LOCATION SIMPLE DESCRIPTION DERM
LOCATION SIMPLE: RIGHT GREAT TOE
LOCATION SIMPLE: RIGHT ARM
LOCATION SIMPLE: CHEST
LOCATION SIMPLE: RIGHT UPPER BACK

## 2024-10-22 ASSESSMENT — LOCATION ZONE DERM
LOCATION ZONE: ARM
LOCATION ZONE: TRUNK
LOCATION ZONE: TOENAIL

## 2025-04-17 ASSESSMENT — LIFESTYLE VARIABLES
SMOKING_STATUS: NO
TOBACCO_USE: NO

## 2025-04-18 ENCOUNTER — HOSPITAL ENCOUNTER (OUTPATIENT)
Dept: RADIATION ONCOLOGY | Facility: MEDICAL CENTER | Age: OVER 89
End: 2025-04-18
Attending: RADIOLOGY
Payer: MEDICARE

## 2025-04-18 VITALS
DIASTOLIC BLOOD PRESSURE: 72 MMHG | HEART RATE: 86 BPM | TEMPERATURE: 97.5 F | BODY MASS INDEX: 25.62 KG/M2 | SYSTOLIC BLOOD PRESSURE: 137 MMHG | OXYGEN SATURATION: 94 % | WEIGHT: 163.58 LBS

## 2025-04-18 PROCEDURE — 99212 OFFICE O/P EST SF 10 MIN: CPT | Mod: 25 | Performed by: RADIOLOGY

## 2025-04-18 PROCEDURE — 31575 DIAGNOSTIC LARYNGOSCOPY: CPT | Performed by: RADIOLOGY

## 2025-04-18 PROCEDURE — 99213 OFFICE O/P EST LOW 20 MIN: CPT | Mod: 25 | Performed by: RADIOLOGY

## 2025-04-18 ASSESSMENT — PAIN SCALES - GENERAL: PAINLEVEL_OUTOF10: NO PAIN

## 2025-04-18 ASSESSMENT — FIBROSIS 4 INDEX: FIB4 SCORE: 2.06

## 2025-04-18 NOTE — PROGRESS NOTES
Patient was seen today in clinic with Dr. Byrne for follow up.  Vitals signs and weight were obtained and pain assessment was completed.  Allergies and medications were reviewed with the patient.  Toxicities of treatment assessed.     Vitals/Pain:  Vitals:    04/18/25 1255   BP: 137/72   BP Location: Right arm   Patient Position: Sitting   BP Cuff Size: Adult   Pulse: 86   Temp: 36.4 °C (97.5 °F)   TempSrc: Temporal   SpO2: 94%   Weight: 74.2 kg (163 lb 9.3 oz)   Pain Score: No pain        Allergies:   Shellfish allergy and Garlic    Current Medications:  Current Outpatient Medications   Medication Sig Dispense Refill    B Complex Vitamins (B COMPLEX PO) Take 1 Tablet by mouth every day.      multivitamin Tab Take 1 Tablet by mouth every day.      ibuprofen (MOTRIN) 200 MG Tab Take 400 mg by mouth every 6 hours as needed. Indications: Pain      Omega-3 Fatty Acids (FISH OIL PO) Take 1 Capsule by mouth every day.      levothyroxine (SYNTHROID) 50 MCG Tab Take 50 mcg by mouth every evening.      ROPINIRole (REQUIP) 0.5 MG Tab Take 0.5 mg by mouth at bedtime as needed. Indications: Restless Leg Syndrome      fluticasone (FLONASE) 50 MCG/ACT nasal spray Spray 1-2 Sprays in nose 1 time daily as needed (for allergies).      fexofenadine (ALLEGRA) 180 MG tablet Take 180 mg by mouth every morning.      gabapentin (NEURONTIN) 300 MG Cap Take 600 mg by mouth 3 times a day.       No current facility-administered medications for this encounter.           Maral Hernandez, Med Ass't

## 2025-04-18 NOTE — PROGRESS NOTES
RADIATION ONCOLOGY FOLLOW-UP    Patient name:  Ina Small    Primary Physician:  HENRRY Earl MRN: [id number]  [facility]: [id number]   Care Team:  Patient Care Team:  HENRRY Earl as PCP - General (Family Medicine)  Bailee CASTANEDA M.D. (Radiation Oncology)  Patrick Jackson M.D. (Medical Oncology)  Nori Trevino, SLP as Speech Therapist (Speech Therapy)  Ida Rushing M.D. (Otolaryngology)  : 10/28/1933, 89 y.o.     DATE OF SERVICE:   2025    IDENTIFICATION:   A 89 y.o. female with  Tonsil cancer (HCC)  Staging form: Pharynx - HPV-Mediated Oropharynx, AJCC 8th Edition  - Clinical stage from 2021: Stage I (cT1, cN1, cM0, p16+) - Signed by Bailee CASTANEDA M.D. on 2021  Histopathologic type: Squamous cell carcinoma, NOS  Stage prefix: Initial diagnosis      RADIATION SUMMARY:  Radiation Oncology          10/5/2021 10/11/2021    15:03   Aria Course Treatment Dates   Course First Treatment Date 2021    Course Last Treatment Date 10/05/2021  10/05/2021    Aria Treatment Summary   HN  Plan from Course [id number]   Fraction 35 of 35    35 of 35    Elapsed Course Days 49 @     49 @     Prescribed Fraction Dose 200 cGy    200 cGy    Prescribed Total Dose 7,000 cGy    7,000 cGy    HN cp  Reference Point from Course [id number]   Elapsed Course Days 49 @     49 @     Session Dose 181 cGy    --    Total Dose 6,351 cGy    6,351 cGy    PTV70  Reference Point from Course [id number]   Elapsed Course Days  @     49 @     Session Dose 200 cGy    --    Total Dose 7,000 cGy    7,000 cGy         HISTORY OF PRESENT ILLNESS:   Subjective    [age]-year-old female with past medical history significant for elevated cholesterol, hypertension, peripheral neuropathy, and cataracts.  She presented to ENT in May for work-up of sinus congestion.  While undergoing work-up she was noted to  have abnormal tonsils described as 2+ with a 5 mm papillomatous growth involving the right tonsil.     Removal of the growth was recommended with concern for possible predisposition to cancer.     She underwent bilateral tonsillectomy 7/15/2021.  The right tonsil was negative for malignancy.  The left tonsil demonstrated squamous cell carcinoma.     Follow-up MRI of the neck and soft tissues 7/23/2021 demonstrated a level two jugulodigastric node on the left measuring approximately 2.3 cm.  PET CT scan 7/26/2021 demonstrated bilateral tonsillar uptake considered to be postoperative.  She also had some mild uptake noted in the level two left neck node.  Uptake in the node was not mentioned in the PET/CT report.     Her primary complaint at this time is pharyngitis post tonsillectomy.  She denies otalgia and weight loss.     She has a remote smoking history total 7.5 pack years.  Stopped smoking approximately 50 years ago.     11/18/21 Initial follow-up visit post completion of therapy.  Reports some mild odynophagia left posterior throat.  Is able to eat reports her appetite is not as good as it used to be.  Denies dysphagia.  Xerostomia 6 ( 0-10, 0=completely dry), Taste 6 ( 0-10, 0=no taste), Dysphagia 0, Odynophagia mild     3/15/2022.  Scheduled follow-up visit.  Overall doing well.  No complaints of dysphagia odynophagia.  Does have some xerostomia and impaired taste.  Getting regular dental visits.  Xerostomia 5 ( 0-10, 0=completely dry), Taste 7 ( 0-10, 0=no taste), Dysphagia 0, Odynophagia 0     5/20/2022.  Scheduled follow-up visit.  Overall doing well.  No complaints of dysphagia odynophagia.  Reports her xerostomia and taste are improving.  Had 1 dental implant in her upper arch without problems.  Xerostomia 5 ( 0-10, 0=completely dry), Taste 7 ( 0-10, 0=no taste), Dysphagia 0, Odynophagia 0     7/21/2022.  Scheduled follow-up visit.  Continues to do well.  Stabilization and taste and xerostomia.  No  "dysphagia or odynophagia.  Getting her teeth capped.  Xerostomia 5  ( 0-10, 0=completely dry), Taste 7-8 ( 0-10, 0=no taste), Dysphagia 0, Odynophagia 0     9/13/2022.  Scheduled follow-up visit.  Continues to do well.  Improvement in xerostomia and taste.  No significant dysphagia or odynophagia.  Getting regular dental care.  Xerostomia 8 ( 0-10, 0=completely dry), Taste 7-8 ( 0-10, 0=no taste), Dysphagia 0, Odynophagia 0     1/13/2023.  Scheduled follow-up visit.  Continues to do well.  Getting regular dental care.  Mild improvement in xerostomia and taste.  No significant dysphagia or odynophagia.  Recently was noted to be hypothyroid and started on Synthroid.  Xerostomia 7 ( 0-10, 0=completely dry), Taste 7 on oh ( 0-10, 0=no taste), Dysphagia 0, Odynophagia 0      7/14/2023.  Scheduled follow-up visit.  Doing well.  Getting regular dental care.  Xerostomia and taste stable.  Is started on thyroid medication.    Xerostomia 5 ( 0-10, 0=completely dry), Taste 4 ( 0-10, 0=no taste), Dysphagia 0, Odynophagia 0    4/19/2024.  Scheduled follow-up visit.  Overall doing well.  Some mild xerostomia and impaired taste.  Managing well.  Getting regular dental care.  Functional Assessment of Cancer Therapy?Head and Neck Radiotherapy measure (FACT-HN_RAD) 0-4  Statement Not at all A little bit Somewhat Quite a bit Very much   My voice has its usual quality and strength         x   I can enjoy the taste of food     x       I can swallow naturally and easily       x     I have pain in my mouth, throat, or neck x           My mouth is dry     x       I am bothered by skin problems x           I am worried about my weight     x       My fatigue keeps me from doing the things I want to do     x       \"Below is a list of statements that other people with your illness said are important.  Please [place] or [place] one number per line to indicate your response as it applies to the past 7 days\"  [facility] Otolaryngol Head Neck Surg. " "doi:[email or web address]        INTERVAL HISTORY:  4/18/2025  Scheduled follow-up visit.  Overall doing well.  No complaints.  Getting regular dental care.  Now completed nearly 4 years.  Functional Assessment of Cancer Therapy?Head and Neck Radiotherapy measure (FACT-HN_RAD) 0-4  Statement Not at all A little bit Somewhat Quite a bit Very much   My voice has its usual quality and strength [] [] [] [] [x]   I can enjoy the taste of food [] [] [x] [] []   I can swallow naturally and easily [] [] [] [x] []   I have pain in my mouth, throat, or neck [x] [] [] [] []   My mouth is dry [] [] [x] [] []   I am bothered by skin problems [x] [] [] [] []   I am worried about my weight [] [x] [] [] []   My fatigue keeps me from doing the things I want to do [] [] [x] [] []   \"Below is a list of statements that other people with your illness said are important.  Please [place] or [place] one number per line to indicate your response as it applies to the past 7 days\"  [facility] Otolaryngol Head Neck Surg. doi:[email or web address]    CURRENT MEDICATIONS:  Current Outpatient Medications   Medication Sig Dispense Refill    B Complex Vitamins (B COMPLEX PO) Take 1 Tablet by mouth every day.      multivitamin Tab Take 1 Tablet by mouth every day.      ibuprofen (MOTRIN) 200 MG Tab Take 400 mg by mouth every 6 hours as needed. Indications: Pain      Omega-3 Fatty Acids (FISH OIL PO) Take 1 Capsule by mouth every day.      levothyroxine (SYNTHROID) 50 MCG Tab Take 50 mcg by mouth every evening.      ROPINIRole (REQUIP) 0.5 MG Tab Take 0.5 mg by mouth at bedtime as needed. Indications: Restless Leg Syndrome      fluticasone (FLONASE) 50 MCG/ACT nasal spray Spray 1-2 Sprays in nose 1 time daily as needed (for allergies).      fexofenadine (ALLEGRA) 180 MG tablet Take 180 mg by mouth every morning.      gabapentin (NEURONTIN) 300 MG Cap Take 600 mg by mouth 3 times a day.       No current facility-administered medications for this " "encounter.       ALLERGIES:  Shellfish allergy and Garlic    REVIEW OF SYSTEMS:    A complete review of system taken. Pertinent items in HPI.  All other negative.    PHYSICAL EXAM:   PERFORMANCE STATUS: 100       No data to display                  4/18/2025    12:55 PM 7/17/2024     4:37 PM 7/17/2024    10:24 AM 7/17/2024     4:45 AM 7/16/2024    10:40 PM 7/16/2024     9:00 PM 7/16/2024     1:44 PM   Vitals   SYSTOLIC 137 145 155 134 146 159 139   DIASTOLIC 72 76 80 72 81 83 87   Pulse 86 70 64 74 62 54 77   Temperature 36.4 °C (97.5 °F) 36.4 °C (97.5 °F) 36.6 °C (97.9 °F) 36.6 °C (97.9 °F) 36.1 °C (97 °F)  36.3 °C (97.4 °F)   Respiration  16 16 16 16  18   Weight 163.58    152.78     Height     1.702 m (5' 7\")     BMI 25.62 kg/m2    23.93 kg/m2     Pulse Oximetry 94 % 92 % 95 % 96 % 96 %  94 %       Physical Exam  Vitals and nursing note reviewed.   Constitutional:       General: She is not in acute distress.     Appearance: She is well-developed.   HENT:      Head: Normocephalic.      Mouth/Throat:      Mouth: Mucous membranes are moist.      Pharynx: Oropharynx is clear. No oropharyngeal exudate or posterior oropharyngeal erythema.   Eyes:      Conjunctiva/sclera: Conjunctivae normal.      Pupils: Pupils are equal, round, and reactive to light.   Cardiovascular:      Rate and Rhythm: Normal rate and regular rhythm.   Pulmonary:      Effort: Pulmonary effort is normal.   Musculoskeletal:         General: Normal range of motion.      Cervical back: Normal range of motion.   Lymphadenopathy:      Cervical: No cervical adenopathy.   Skin:     General: Skin is warm and dry.      Findings: No erythema.   Neurological:      Mental Status: She is alert and oriented to person, place, and time.   Psychiatric:         Behavior: Behavior normal.         Thought Content: Thought content normal.         Judgment: Judgment normal.           FIBEROPTIC EXAM:  Normal exam    LABORATORY DATA:   Lab Results   Component Value " Date/Time    WBC 4.2 (L) 07/17/2024 03:08 AM    RBC 5.05 07/17/2024 03:08 AM    HEMOGLOBIN 14.5 07/17/2024 03:08 AM    HEMATOCRIT 42.5 07/17/2024 03:08 AM    MCV 84.2 07/17/2024 03:08 AM    MCH 28.7 07/17/2024 03:08 AM    MCHC 34.1 07/17/2024 03:08 AM    RDW 41.6 07/17/2024 03:08 AM    PLATELETCT 226 07/17/2024 03:08 AM    MPV 9.2 07/17/2024 03:08 AM    NEUTSPOLYS 67.30 07/16/2024 02:58 PM    LYMPHOCYTES 24.20 07/16/2024 02:58 PM    MONOCYTES 7.10 07/16/2024 02:58 PM    EOSINOPHILS 0.80 07/16/2024 02:58 PM    BASOPHILS 0.20 07/16/2024 02:58 PM      Lab Results   Component Value Date/Time    SODIUM 125 (L) 07/17/2024 02:01 PM    POTASSIUM 4.1 07/17/2024 02:01 PM    CHLORIDE 94 (L) 07/17/2024 02:01 PM    CO2 21 07/17/2024 02:01 PM    GLUCOSE 119 (H) 07/17/2024 02:01 PM    BUN 8 07/17/2024 02:01 PM    CREATININE 0.60 07/17/2024 02:01 PM         RADIOLOGY DATA:  No results found.    IMPRESSION:    A 89 y.o. with  Tonsil cancer (HCC)  Staging form: Pharynx - HPV-Mediated Oropharynx, AJCC 8th Edition  - Clinical stage from 7/30/2021: Stage I (cT1, cN1, cM0, p16+) - Signed by Bailee CASTANEDA M.D. on 7/30/2021  Histopathologic type: Squamous cell carcinoma, NOS  Stage prefix: Initial diagnosis      CANCER STATUS:  No Evidence of Disease    RECOMMENDATIONS:   Reviewed exam findings.  Reassured her.  At this point will discharge from clinic.  She will follow-up with Dr. Rushing.  Will see her back as needed.    Thank you for the opportunity to participate in her care.  If any questions or comments, please do not hesitate in calling.    No orders of the defined types were placed in this encounter.

## 2025-04-18 NOTE — PROCEDURES
DATE OF SERVICE: 4/18/2025         FIBEROPTIC NASO-PHARYNGOSCOPY NOTE      Flexible fiberoptic exam was performed after anesthesia of left nostril and oropharynx.    Nasopharynx:       R. Eustachian canal opening, torus, fossa of Rosenmuller appear normal  L. Eustachian canal opening, torus, fossa of Rosenmuller appear normal      Oropharynx:        Base of tongue normal appearing.  Vallecula normal appearing.  Epiglottis normal appearing.  PE folds normal appearing.    Larynx:      R. AE fold, false vocal fold, arytenoid appear normal  L. AE fold, false vocal fold, arytenoid appear normal  R. Cord mobile  L. Cord mobile   R. Piriform sinus appears normal  L. Piriform sinus appears normal      Bailee CASTANEDA M.D.  Electronically signed by: Bailee CASTANEDA M.D., 4/18/2025 1:40 PM  [phone]

## (undated) DEVICE — LACTATED RINGERS INJ 1000 ML - (14EA/CA 60CA/PF)

## (undated) DEVICE — IV SET, NON-VENT PLUMB PUMP

## (undated) DEVICE — TUBE NG SALEM SUMP 16FR (50EA/CA)

## (undated) DEVICE — ELECTRODE 850 FOAM ADHESIVE - HYDROGEL RADIOTRNSPRNT (50/PK)

## (undated) DEVICE — SENSOR SPO2 NEO LNCS ADHESIVE (20/BX) SEE USER NOTES

## (undated) DEVICE — SUTURE GENERAL

## (undated) DEVICE — HUMID-VENT HEAT AND MOISTURE EXCHANGE- (50/BX)

## (undated) DEVICE — TOWEL STOP TIMEOUT SAFETY FLAG (40EA/CA)

## (undated) DEVICE — NEPTUNE 4 PORT MANIFOLD - (20/PK)

## (undated) DEVICE — HEAD HOLDER JUNIOR/ADULT

## (undated) DEVICE — BAG, SPONGE COUNT 50600

## (undated) DEVICE — TUBE CONNECTING SUCTION - CLEAR PLASTIC STERILE 72 IN (50EA/CA)

## (undated) DEVICE — MASK ANESTHESIA ADULT  - (100/CA)

## (undated) DEVICE — GLOVE SZ 7.5 BIOGEL PI MICRO - PF LF (50PR/BX)

## (undated) DEVICE — KIT  I.V. START (100EA/CA)

## (undated) DEVICE — TUBING CLEARLINK DUO-VENT - C-FLO (48EA/CA)

## (undated) DEVICE — SUTURE 2-0 MONOCRYL PLUS UNDYED CT-1 1 X 36 (36EA/BX)"

## (undated) DEVICE — TIP INTPLS HFLO ML ORFC BTRY - (12/CS)  FOR SURGILAV

## (undated) DEVICE — ELECTRODE DUAL RETURN W/ CORD - (50/PK)

## (undated) DEVICE — CANISTER SUCTION RIGID RED 1500CC (40EA/CA)

## (undated) DEVICE — WATER IRRIGATION STERILE 1000ML (12EA/CA)

## (undated) DEVICE — CATHETER FOLEY ROBINSON 10FR 16IN STRL (12EA/CA)

## (undated) DEVICE — STOCKINETTE IMPERVIOUS 12X48 - STERILELF (10/CA)"

## (undated) DEVICE — CATHETER IV 20 GA X 1-1/4 ---SURG.& SDS ONLY--- (50EA/BX)

## (undated) DEVICE — GOWN WARMING STANDARD FLEX - (30/CA)

## (undated) DEVICE — SUCTION INSTRUMENT YANKAUER BULBOUS TIP W/O VENT (50EA/CA)

## (undated) DEVICE — KIT ROOM DECONTAMINATION

## (undated) DEVICE — SODIUM CHL IRRIGATION 0.9% 1000ML (12EA/CA)

## (undated) DEVICE — Device

## (undated) DEVICE — BLADE RECIPROCATING 12.7 X 78.7 X 1.0MM (1/EA)

## (undated) DEVICE — PROTECTOR ULNA NERVE - (36PR/CA)

## (undated) DEVICE — TUBE NG SALEM SUMP 14FR (50EA/BX)

## (undated) DEVICE — GOWN SURGEONS X-LARGE - DISP. (30/CA)

## (undated) DEVICE — SET LEADWIRE 5 LEAD BEDSIDE DISPOSABLE ECG (1SET OF 5/EA)

## (undated) DEVICE — MASK, LARYNGEAL AIRWAY #4

## (undated) DEVICE — MIXER BONE CEMENT REVOLUTION - W/FEMORAL PRESSURIZER (6/CA)

## (undated) DEVICE — GVL 3 STAT DISPOSABLE - (10/BX)

## (undated) DEVICE — SODIUM CHL. IRRIGATION 0.9% 3000ML (4EA/CA 65CA/PF)

## (undated) DEVICE — BLOCK

## (undated) DEVICE — PROBE ENT ORAL LPT1635FN - (10/BX)

## (undated) DEVICE — DRESSING AQUACEL AG ADVANTAGE 3.5 X 10" (10EA/BX)"

## (undated) DEVICE — KIT ANESTHESIA W/CIRCUIT & 3/LT BAG W/FILTER (20EA/CA)

## (undated) DEVICE — TUBE E-T HI-LO CUFF 7.0MM (10EA/PK)

## (undated) DEVICE — PACK ENT OR - (2EA/CA)

## (undated) DEVICE — TOURNIQUET CUFF 24 X 4 ONE PORT - STERILE (10/BX)

## (undated) DEVICE — GLOVE BIOGEL PI ORTHO SZ 7.5 PF LF (40PR/BX)

## (undated) DEVICE — BOVIE FOOT CONTROL SUCTION - 6IN 10FR (25EA/CA)

## (undated) DEVICE — SPONGE TONSIL MEDIUM XRAY STERILE 1 - (5/PK 20PK/CA)"

## (undated) DEVICE — GLOVE, LITE (PAIR)

## (undated) DEVICE — GLOVE SZ 7 BIOGEL PI MICRO - PF LF (50PR/BX 4BX/CA)

## (undated) DEVICE — CANISTER SUCTION 3000ML MECHANICAL FILTER AUTO SHUTOFF MEDI-VAC NONSTERILE LF DISP  (40EA/CA)

## (undated) DEVICE — HANDPIECE 10FT INTPLS SCT PLS IRRIGATION HAND CONTROL SET (6/PK)

## (undated) DEVICE — BLADE SAGITTAL 6 SYSTEM 25MM

## (undated) DEVICE — ANTI-FOG SOLUTION - 60BTL/CA

## (undated) DEVICE — PAD UNIVERSAL MULTI USE (1/EA)

## (undated) DEVICE — TRAY SKIN SCRUB PVP WET (20EA/CA) PART #DYND70356 DISCONTINUED